# Patient Record
Sex: MALE | Race: BLACK OR AFRICAN AMERICAN | Employment: OTHER | URBAN - METROPOLITAN AREA
[De-identification: names, ages, dates, MRNs, and addresses within clinical notes are randomized per-mention and may not be internally consistent; named-entity substitution may affect disease eponyms.]

---

## 2022-01-01 ENCOUNTER — APPOINTMENT (OUTPATIENT)
Dept: NON INVASIVE DIAGNOSTICS | Age: 72
DRG: 064 | End: 2022-01-01
Attending: INTERNAL MEDICINE
Payer: MEDICARE

## 2022-01-01 ENCOUNTER — APPOINTMENT (OUTPATIENT)
Dept: GENERAL RADIOLOGY | Age: 72
DRG: 064 | End: 2022-01-01
Attending: INTERNAL MEDICINE
Payer: MEDICARE

## 2022-01-01 ENCOUNTER — APPOINTMENT (OUTPATIENT)
Dept: CT IMAGING | Age: 72
DRG: 064 | End: 2022-01-01
Attending: INTERNAL MEDICINE
Payer: MEDICARE

## 2022-01-01 ENCOUNTER — APPOINTMENT (OUTPATIENT)
Dept: ULTRASOUND IMAGING | Age: 72
DRG: 064 | End: 2022-01-01
Attending: INTERNAL MEDICINE
Payer: MEDICARE

## 2022-01-01 ENCOUNTER — APPOINTMENT (OUTPATIENT)
Dept: MRI IMAGING | Age: 72
DRG: 064 | End: 2022-01-01
Attending: INTERNAL MEDICINE
Payer: MEDICARE

## 2022-01-01 ENCOUNTER — HOSPICE ADMISSION (OUTPATIENT)
Dept: HOSPICE | Facility: HOSPICE | Age: 72
End: 2022-01-01
Payer: MEDICARE

## 2022-01-01 ENCOUNTER — HOME CARE VISIT (OUTPATIENT)
Dept: SCHEDULING | Facility: HOME HEALTH | Age: 72
End: 2022-01-01
Payer: MEDICARE

## 2022-01-01 ENCOUNTER — HOME CARE VISIT (OUTPATIENT)
Dept: HOSPICE | Facility: HOSPICE | Age: 72
End: 2022-01-01
Payer: MEDICARE

## 2022-01-01 ENCOUNTER — APPOINTMENT (OUTPATIENT)
Dept: CT IMAGING | Age: 72
DRG: 064 | End: 2022-01-01
Attending: EMERGENCY MEDICINE
Payer: MEDICARE

## 2022-01-01 ENCOUNTER — APPOINTMENT (OUTPATIENT)
Dept: GENERAL RADIOLOGY | Age: 72
DRG: 064 | End: 2022-01-01
Attending: EMERGENCY MEDICINE
Payer: MEDICARE

## 2022-01-01 ENCOUNTER — APPOINTMENT (OUTPATIENT)
Dept: VASCULAR SURGERY | Age: 72
DRG: 064 | End: 2022-01-01
Attending: INTERNAL MEDICINE
Payer: MEDICARE

## 2022-01-01 ENCOUNTER — HOSPITAL ENCOUNTER (INPATIENT)
Age: 72
LOS: 12 days | Discharge: HOSPICE | DRG: 064 | End: 2022-09-04
Attending: EMERGENCY MEDICINE | Admitting: INTERNAL MEDICINE
Payer: MEDICARE

## 2022-01-01 VITALS
BODY MASS INDEX: 21.8 KG/M2 | RESPIRATION RATE: 19 BRPM | DIASTOLIC BLOOD PRESSURE: 74 MMHG | HEART RATE: 112 BPM | OXYGEN SATURATION: 93 % | SYSTOLIC BLOOD PRESSURE: 123 MMHG | TEMPERATURE: 98.2 F | HEIGHT: 72 IN | WEIGHT: 160.94 LBS

## 2022-01-01 VITALS
SYSTOLIC BLOOD PRESSURE: 120 MMHG | HEART RATE: 120 BPM | OXYGEN SATURATION: 88 % | DIASTOLIC BLOOD PRESSURE: 72 MMHG | RESPIRATION RATE: 22 BRPM

## 2022-01-01 DIAGNOSIS — R63.30 FEEDING DIFFICULTY: ICD-10-CM

## 2022-01-01 DIAGNOSIS — N30.01 ACUTE CYSTITIS WITH HEMATURIA: ICD-10-CM

## 2022-01-01 DIAGNOSIS — G93.40 ACUTE ENCEPHALOPATHY: Primary | ICD-10-CM

## 2022-01-01 DIAGNOSIS — Z71.89 GOALS OF CARE, COUNSELING/DISCUSSION: ICD-10-CM

## 2022-01-01 LAB
25(OH)D3 SERPL-MCNC: 26 NG/ML (ref 30–100)
ALBUMIN SERPL ELPH-MCNC: 3.1 G/DL (ref 2.9–4.4)
ALBUMIN SERPL-MCNC: 2.5 G/DL (ref 3.5–5)
ALBUMIN SERPL-MCNC: 2.5 G/DL (ref 3.5–5)
ALBUMIN SERPL-MCNC: 2.6 G/DL (ref 3.5–5)
ALBUMIN SERPL-MCNC: 2.7 G/DL (ref 3.5–5)
ALBUMIN SERPL-MCNC: 3.4 G/DL (ref 3.5–5)
ALBUMIN SERPL-MCNC: 3.6 G/DL (ref 3.5–5)
ALBUMIN/GLOB SERPL: 0.6 {RATIO} (ref 1.1–2.2)
ALBUMIN/GLOB SERPL: 0.6 {RATIO} (ref 1.1–2.2)
ALBUMIN/GLOB SERPL: 1 {RATIO} (ref 1.1–2.2)
ALBUMIN/GLOB SERPL: 1.1 {RATIO} (ref 0.7–1.7)
ALBUMIN/GLOB SERPL: 1.1 {RATIO} (ref 1.1–2.2)
ALP SERPL-CCNC: 106 U/L (ref 45–117)
ALP SERPL-CCNC: 114 U/L (ref 45–117)
ALP SERPL-CCNC: 75 U/L (ref 45–117)
ALP SERPL-CCNC: 78 U/L (ref 45–117)
ALPHA1 GLOB SERPL ELPH-MCNC: 0.5 G/DL (ref 0–0.4)
ALPHA2 GLOB SERPL ELPH-MCNC: 0.8 G/DL (ref 0.4–1)
ALT SERPL-CCNC: 12 U/L (ref 12–78)
ALT SERPL-CCNC: 14 U/L (ref 12–78)
ALT SERPL-CCNC: 18 U/L (ref 12–78)
ALT SERPL-CCNC: 20 U/L (ref 12–78)
AMMONIA PLAS-SCNC: 16 UMOL/L
AMMONIA PLAS-SCNC: <10 UMOL/L
AMPHET UR QL SCN: NEGATIVE
ANA SER QL: NEGATIVE
ANION GAP SERPL CALC-SCNC: 12 MMOL/L (ref 5–15)
ANION GAP SERPL CALC-SCNC: 5 MMOL/L (ref 5–15)
ANION GAP SERPL CALC-SCNC: 6 MMOL/L (ref 5–15)
ANION GAP SERPL CALC-SCNC: 6 MMOL/L (ref 5–15)
ANION GAP SERPL CALC-SCNC: 7 MMOL/L (ref 5–15)
ANION GAP SERPL CALC-SCNC: 8 MMOL/L (ref 5–15)
ANION GAP SERPL CALC-SCNC: 9 MMOL/L (ref 5–15)
ANION GAP SERPL CALC-SCNC: 9 MMOL/L (ref 5–15)
APPEARANCE UR: ABNORMAL
APPEARANCE UR: CLEAR
ARTERIAL PATENCY WRIST A: POSITIVE
AST SERPL-CCNC: 11 U/L (ref 15–37)
AST SERPL-CCNC: 12 U/L (ref 15–37)
AST SERPL-CCNC: 13 U/L (ref 15–37)
AST SERPL-CCNC: 14 U/L (ref 15–37)
ATRIAL RATE: 101 BPM
B-GLOBULIN SERPL ELPH-MCNC: 0.9 G/DL (ref 0.7–1.3)
BACTERIA SPEC CULT: NORMAL
BACTERIA URNS QL MICRO: ABNORMAL /HPF
BACTERIA URNS QL MICRO: NEGATIVE /HPF
BARBITURATES UR QL SCN: NEGATIVE
BASE DEFICIT BLD-SCNC: 0.3 MMOL/L
BASE DEFICIT BLD-SCNC: 1.3 MMOL/L
BASOPHILS # BLD: 0 K/UL (ref 0–0.1)
BASOPHILS NFR BLD: 0 % (ref 0–1)
BDY SITE: ABNORMAL
BENZODIAZ UR QL: NEGATIVE
BILIRUB SERPL-MCNC: 0.5 MG/DL (ref 0.2–1)
BILIRUB SERPL-MCNC: 0.6 MG/DL (ref 0.2–1)
BILIRUB SERPL-MCNC: 0.7 MG/DL (ref 0.2–1)
BILIRUB SERPL-MCNC: 0.7 MG/DL (ref 0.2–1)
BILIRUB UR QL: NEGATIVE
BILIRUB UR QL: NEGATIVE
BNP SERPL-MCNC: 674 PG/ML
BUN SERPL-MCNC: 129 MG/DL (ref 6–20)
BUN SERPL-MCNC: 30 MG/DL (ref 6–20)
BUN SERPL-MCNC: 33 MG/DL (ref 6–20)
BUN SERPL-MCNC: 35 MG/DL (ref 6–20)
BUN SERPL-MCNC: 42 MG/DL (ref 6–20)
BUN SERPL-MCNC: 43 MG/DL (ref 6–20)
BUN SERPL-MCNC: 50 MG/DL (ref 6–20)
BUN SERPL-MCNC: 59 MG/DL (ref 6–20)
BUN SERPL-MCNC: 81 MG/DL (ref 6–20)
BUN SERPL-MCNC: 92 MG/DL (ref 6–20)
BUN/CREAT SERPL: 10 (ref 12–20)
BUN/CREAT SERPL: 10 (ref 12–20)
BUN/CREAT SERPL: 11 (ref 12–20)
BUN/CREAT SERPL: 12 (ref 12–20)
BUN/CREAT SERPL: 12 (ref 12–20)
BUN/CREAT SERPL: 16 (ref 12–20)
BUN/CREAT SERPL: 16 (ref 12–20)
BUN/CREAT SERPL: 17 (ref 12–20)
C-ANCA TITR SER IF: NORMAL TITER
CA-I BLD-MCNC: 1.24 MMOL/L (ref 1.12–1.32)
CALCIUM SERPL-MCNC: 8.6 MG/DL (ref 8.5–10.1)
CALCIUM SERPL-MCNC: 8.7 MG/DL (ref 8.5–10.1)
CALCIUM SERPL-MCNC: 9 MG/DL (ref 8.5–10.1)
CALCIUM SERPL-MCNC: 9.1 MG/DL (ref 8.5–10.1)
CALCIUM SERPL-MCNC: 9.1 MG/DL (ref 8.5–10.1)
CALCULATED P AXIS, ECG09: 76 DEGREES
CALCULATED R AXIS, ECG10: 41 DEGREES
CALCULATED T AXIS, ECG11: -32 DEGREES
CANNABINOIDS UR QL SCN: NEGATIVE
CAOX CRY URNS QL MICRO: ABNORMAL
CHLORIDE BLD-SCNC: 110 MMOL/L (ref 100–108)
CHLORIDE SERPL-SCNC: 110 MMOL/L (ref 97–108)
CHLORIDE SERPL-SCNC: 111 MMOL/L (ref 97–108)
CHLORIDE SERPL-SCNC: 111 MMOL/L (ref 97–108)
CHLORIDE SERPL-SCNC: 113 MMOL/L (ref 97–108)
CHLORIDE SERPL-SCNC: 114 MMOL/L (ref 97–108)
CHLORIDE SERPL-SCNC: 119 MMOL/L (ref 97–108)
CHLORIDE SERPL-SCNC: 119 MMOL/L (ref 97–108)
CHLORIDE SERPL-SCNC: 123 MMOL/L (ref 97–108)
CHLORIDE SERPL-SCNC: 126 MMOL/L (ref 97–108)
CHLORIDE SERPL-SCNC: 129 MMOL/L (ref 97–108)
CHLORIDE UR-SCNC: 10 MMOL/L
CHOLEST SERPL-MCNC: 196 MG/DL
CK SERPL-CCNC: 169 U/L (ref 39–308)
CK SERPL-CCNC: 97 U/L (ref 39–308)
CO2 BLD-SCNC: 27 MMOL/L (ref 19–24)
CO2 SERPL-SCNC: 19 MMOL/L (ref 21–32)
CO2 SERPL-SCNC: 19 MMOL/L (ref 21–32)
CO2 SERPL-SCNC: 21 MMOL/L (ref 21–32)
CO2 SERPL-SCNC: 22 MMOL/L (ref 21–32)
CO2 SERPL-SCNC: 23 MMOL/L (ref 21–32)
CO2 SERPL-SCNC: 23 MMOL/L (ref 21–32)
CO2 SERPL-SCNC: 24 MMOL/L (ref 21–32)
CO2 SERPL-SCNC: 24 MMOL/L (ref 21–32)
CO2 SERPL-SCNC: 27 MMOL/L (ref 21–32)
CO2 SERPL-SCNC: 28 MMOL/L (ref 21–32)
COCAINE UR QL SCN: NEGATIVE
COLOR UR: ABNORMAL
COLOR UR: ABNORMAL
COMMENT, HOLDF: NORMAL
CREAT SERPL-MCNC: 3.04 MG/DL (ref 0.7–1.3)
CREAT SERPL-MCNC: 3.22 MG/DL (ref 0.7–1.3)
CREAT SERPL-MCNC: 3.28 MG/DL (ref 0.7–1.3)
CREAT SERPL-MCNC: 3.81 MG/DL (ref 0.7–1.3)
CREAT SERPL-MCNC: 3.92 MG/DL (ref 0.7–1.3)
CREAT SERPL-MCNC: 4.33 MG/DL (ref 0.7–1.3)
CREAT SERPL-MCNC: 4.73 MG/DL (ref 0.7–1.3)
CREAT SERPL-MCNC: 5.14 MG/DL (ref 0.7–1.3)
CREAT SERPL-MCNC: 5.72 MG/DL (ref 0.7–1.3)
CREAT SERPL-MCNC: 7.66 MG/DL (ref 0.7–1.3)
CREAT UR-MCNC: 189 MG/DL
CREAT UR-MCNC: 3.7 MG/DL (ref 0.6–1.3)
DATE LAST DOSE: ABNORMAL
DIAGNOSIS, 93000: NORMAL
DIFFERENTIAL METHOD BLD: ABNORMAL
DRUG SCRN COMMENT,DRGCM: NORMAL
ECHO AV MEAN GRADIENT: 3 MMHG
ECHO AV MEAN VELOCITY: 0.8 M/S
ECHO AV PEAK GRADIENT: 4 MMHG
ECHO AV PEAK VELOCITY: 1 M/S
ECHO AV VTI: 17.6 CM
ECHO LA DIAMETER: 2.6 CM
ECHO LV E' LATERAL VELOCITY: 6 CM/S
ECHO LV E' SEPTAL VELOCITY: 4 CM/S
ECHO LV FRACTIONAL SHORTENING: 37 % (ref 28–44)
ECHO LV INTERNAL DIMENSION DIASTOLIC: 4.1 CM (ref 4.2–5.9)
ECHO LV INTERNAL DIMENSION SYSTOLIC: 2.6 CM
ECHO LV IVSD: 0.8 CM (ref 0.6–1)
ECHO LV MASS 2D: 123 G (ref 88–224)
ECHO LV POSTERIOR WALL DIASTOLIC: 1.1 CM (ref 0.6–1)
ECHO LV RELATIVE WALL THICKNESS RATIO: 0.54
ECHO LVOT AREA: 2.5 CM2
ECHO LVOT DIAM: 1.8 CM
ECHO MV A VELOCITY: 0.78 M/S
ECHO MV E DECELERATION TIME (DT): 66.9 MS
ECHO MV E VELOCITY: 0.49 M/S
ECHO MV E/A RATIO: 0.63
ECHO MV E/E' LATERAL: 8.17
ECHO MV E/E' RATIO (AVERAGED): 10.21
ECHO MV E/E' SEPTAL: 12.25
EOSINOPHIL # BLD: 0 K/UL (ref 0–0.4)
EOSINOPHIL # BLD: 0 K/UL (ref 0–0.4)
EOSINOPHIL # BLD: 0.1 K/UL (ref 0–0.4)
EOSINOPHIL # BLD: 0.4 K/UL (ref 0–0.4)
EOSINOPHIL NFR BLD: 0 % (ref 0–7)
EOSINOPHIL NFR BLD: 1 % (ref 0–7)
EOSINOPHIL NFR BLD: 4 % (ref 0–7)
EOSINOPHIL NFR BLD: 6 % (ref 0–7)
EPITH CASTS URNS QL MICRO: ABNORMAL /LPF
EPITH CASTS URNS QL MICRO: ABNORMAL /LPF
ERYTHROCYTE [DISTWIDTH] IN BLOOD BY AUTOMATED COUNT: 12.3 % (ref 11.5–14.5)
ERYTHROCYTE [DISTWIDTH] IN BLOOD BY AUTOMATED COUNT: 12.3 % (ref 11.5–14.5)
ERYTHROCYTE [DISTWIDTH] IN BLOOD BY AUTOMATED COUNT: 12.9 % (ref 11.5–14.5)
ERYTHROCYTE [DISTWIDTH] IN BLOOD BY AUTOMATED COUNT: 13 % (ref 11.5–14.5)
ERYTHROCYTE [DISTWIDTH] IN BLOOD BY AUTOMATED COUNT: 13.1 % (ref 11.5–14.5)
ERYTHROCYTE [SEDIMENTATION RATE] IN BLOOD: 42 MM/HR (ref 0–20)
EST. AVERAGE GLUCOSE BLD GHB EST-MCNC: 105 MG/DL
ETHANOL SERPL-MCNC: <10 MG/DL
FOLATE SERPL-MCNC: 14.3 NG/ML (ref 5–21)
GAMMA GLOB SERPL ELPH-MCNC: 0.7 G/DL (ref 0.4–1.8)
GAS FLOW.O2 O2 DELIVERY SYS: ABNORMAL L/MIN
GLOBULIN SER CALC-MCNC: 3.3 G/DL (ref 2–4)
GLOBULIN SER CALC-MCNC: 3.4 G/DL (ref 2–4)
GLOBULIN SER CALC-MCNC: 3.9 G/DL (ref 2–4)
GLOBULIN SER CALC-MCNC: 4.1 G/DL (ref 2–4)
GLOBULIN SER-MCNC: 2.9 G/DL (ref 2.2–3.9)
GLUCOSE BLD STRIP.AUTO-MCNC: 103 MG/DL (ref 65–117)
GLUCOSE BLD STRIP.AUTO-MCNC: 124 MG/DL (ref 65–117)
GLUCOSE BLD STRIP.AUTO-MCNC: 154 MG/DL (ref 65–117)
GLUCOSE BLD STRIP.AUTO-MCNC: 169 MG/DL (ref 65–117)
GLUCOSE BLD STRIP.AUTO-MCNC: 98 MG/DL (ref 74–106)
GLUCOSE SERPL-MCNC: 104 MG/DL (ref 65–100)
GLUCOSE SERPL-MCNC: 105 MG/DL (ref 65–100)
GLUCOSE SERPL-MCNC: 123 MG/DL (ref 65–100)
GLUCOSE SERPL-MCNC: 148 MG/DL (ref 65–100)
GLUCOSE SERPL-MCNC: 151 MG/DL (ref 65–100)
GLUCOSE SERPL-MCNC: 152 MG/DL (ref 65–100)
GLUCOSE SERPL-MCNC: 164 MG/DL (ref 65–100)
GLUCOSE SERPL-MCNC: 165 MG/DL (ref 65–100)
GLUCOSE SERPL-MCNC: 184 MG/DL (ref 65–100)
GLUCOSE SERPL-MCNC: 218 MG/DL (ref 65–100)
GLUCOSE UR STRIP.AUTO-MCNC: NEGATIVE MG/DL
GLUCOSE UR STRIP.AUTO-MCNC: NEGATIVE MG/DL
GRAN CASTS URNS QL MICRO: ABNORMAL /LPF
HAV IGM SER QL: NONREACTIVE
HBA1C MFR BLD: 5.3 % (ref 4–5.6)
HBV CORE IGM SER QL: NONREACTIVE
HBV SURFACE AG SER QL: <0.1 INDEX
HBV SURFACE AG SER QL: NEGATIVE
HCO3 BLD-SCNC: 22.2 MMOL/L (ref 22–26)
HCO3 BLDA-SCNC: 27 MMOL/L
HCT VFR BLD AUTO: 28 % (ref 36.6–50.3)
HCT VFR BLD AUTO: 28.3 % (ref 36.6–50.3)
HCT VFR BLD AUTO: 29.4 % (ref 36.6–50.3)
HCT VFR BLD AUTO: 29.4 % (ref 36.6–50.3)
HCT VFR BLD AUTO: 30.2 % (ref 36.6–50.3)
HCV AB SERPL QL IA: NONREACTIVE
HCYS SERPL-SCNC: 20.2 UMOL/L (ref 3.7–13.9)
HDLC SERPL-MCNC: 50 MG/DL
HDLC SERPL: 3.9 {RATIO} (ref 0–5)
HGB BLD-MCNC: 8.7 G/DL (ref 12.1–17)
HGB BLD-MCNC: 9.3 G/DL (ref 12.1–17)
HGB BLD-MCNC: 9.4 G/DL (ref 12.1–17)
HGB BLD-MCNC: 9.7 G/DL (ref 12.1–17)
HGB BLD-MCNC: 9.9 G/DL (ref 12.1–17)
HGB UR QL STRIP: ABNORMAL
HGB UR QL STRIP: ABNORMAL
IGA SERPL-MCNC: 71 MG/DL (ref 61–437)
IGG SERPL-MCNC: 825 MG/DL (ref 603–1613)
IGM SERPL-MCNC: 59 MG/DL (ref 15–143)
IMM GRANULOCYTES # BLD AUTO: 0 K/UL (ref 0–0.04)
IMM GRANULOCYTES # BLD AUTO: 0.1 K/UL (ref 0–0.04)
IMM GRANULOCYTES NFR BLD AUTO: 0 % (ref 0–0.5)
IMM GRANULOCYTES NFR BLD AUTO: 1 % (ref 0–0.5)
INR PPP: 1 (ref 0.9–1.1)
INTERPRETATION SERPL IEP-IMP: ABNORMAL
KAPPA LC FREE SER-MCNC: 38.2 MG/L (ref 3.3–19.4)
KAPPA LC FREE/LAMBDA FREE SER: 0.01 {RATIO} (ref 0.26–1.65)
KETONES UR QL STRIP.AUTO: NEGATIVE MG/DL
KETONES UR QL STRIP.AUTO: NEGATIVE MG/DL
LACTATE BLD-SCNC: 1.58 MMOL/L (ref 0.4–2)
LACTATE SERPL-SCNC: 1.1 MMOL/L (ref 0.4–2)
LAMBDA LC FREE SERPL-MCNC: 4535.2 MG/L (ref 5.7–26.3)
LDLC SERPL CALC-MCNC: 128.2 MG/DL (ref 0–100)
LEFT CCA DIST DIAS: 8.7 CM/S
LEFT CCA DIST SYS: 47.2 CM/S
LEFT CCA PROX DIAS: 11.1 CM/S
LEFT CCA PROX SYS: 77.4 CM/S
LEFT ECA DIAS: 0 CM/S
LEFT ECA SYS: 71.3 CM/S
LEFT ICA DIST DIAS: 20.8 CM/S
LEFT ICA DIST SYS: 74.6 CM/S
LEFT ICA MID DIAS: 15.6 CM/S
LEFT ICA MID SYS: 56.6 CM/S
LEFT ICA PROX DIAS: 7.8 CM/S
LEFT ICA PROX SYS: 21.7 CM/S
LEFT ICA/CCA SYS: 1.58 NO UNITS
LEFT VERTEBRAL DIAS: 9.18 CM/S
LEFT VERTEBRAL SYS: 27 CM/S
LEUKOCYTE ESTERASE UR QL STRIP.AUTO: NEGATIVE
LEUKOCYTE ESTERASE UR QL STRIP.AUTO: NEGATIVE
LYMPHOCYTES # BLD: 0.5 K/UL (ref 0.8–3.5)
LYMPHOCYTES # BLD: 0.5 K/UL (ref 0.8–3.5)
LYMPHOCYTES # BLD: 0.8 K/UL (ref 0.8–3.5)
LYMPHOCYTES # BLD: 1 K/UL (ref 0.8–3.5)
LYMPHOCYTES NFR BLD: 10 % (ref 12–49)
LYMPHOCYTES NFR BLD: 11 % (ref 12–49)
LYMPHOCYTES NFR BLD: 12 % (ref 12–49)
LYMPHOCYTES NFR BLD: 26 % (ref 12–49)
M PROTEIN SERPL ELPH-MCNC: ABNORMAL G/DL
MCH RBC QN AUTO: 30.9 PG (ref 26–34)
MCH RBC QN AUTO: 31 PG (ref 26–34)
MCH RBC QN AUTO: 31.1 PG (ref 26–34)
MCH RBC QN AUTO: 31.5 PG (ref 26–34)
MCH RBC QN AUTO: 31.8 PG (ref 26–34)
MCHC RBC AUTO-ENTMCNC: 31.1 G/DL (ref 30–36.5)
MCHC RBC AUTO-ENTMCNC: 32 G/DL (ref 30–36.5)
MCHC RBC AUTO-ENTMCNC: 32.8 G/DL (ref 30–36.5)
MCHC RBC AUTO-ENTMCNC: 32.9 G/DL (ref 30–36.5)
MCHC RBC AUTO-ENTMCNC: 33 G/DL (ref 30–36.5)
MCV RBC AUTO: 94.6 FL (ref 80–99)
MCV RBC AUTO: 95.5 FL (ref 80–99)
MCV RBC AUTO: 97 FL (ref 80–99)
MCV RBC AUTO: 97.1 FL (ref 80–99)
MCV RBC AUTO: 99.3 FL (ref 80–99)
METHADONE UR QL: NEGATIVE
MONOCYTES # BLD: 0 K/UL (ref 0–1)
MONOCYTES # BLD: 0.2 K/UL (ref 0–1)
MONOCYTES # BLD: 0.2 K/UL (ref 0–1)
MONOCYTES # BLD: 0.4 K/UL (ref 0–1)
MONOCYTES NFR BLD: 1 % (ref 5–13)
MONOCYTES NFR BLD: 3 % (ref 5–13)
MONOCYTES NFR BLD: 5 % (ref 5–13)
MONOCYTES NFR BLD: 7 % (ref 5–13)
MYELOPEROXIDASE AB SER IA-ACNC: <0.2 UNITS (ref 0–0.9)
NEUTS BAND NFR BLD MANUAL: 3 %
NEUTS BAND NFR BLD MANUAL: 30 %
NEUTS SEG # BLD: 2.4 K/UL (ref 1.8–8)
NEUTS SEG # BLD: 3.8 K/UL (ref 1.8–8)
NEUTS SEG # BLD: 4.5 K/UL (ref 1.8–8)
NEUTS SEG # BLD: 4.7 K/UL (ref 1.8–8)
NEUTS SEG NFR BLD: 57 % (ref 32–75)
NEUTS SEG NFR BLD: 65 % (ref 32–75)
NEUTS SEG NFR BLD: 74 % (ref 32–75)
NEUTS SEG NFR BLD: 84 % (ref 32–75)
NITRITE UR QL STRIP.AUTO: NEGATIVE
NITRITE UR QL STRIP.AUTO: NEGATIVE
NRBC # BLD: 0 K/UL (ref 0–0.01)
NRBC # BLD: 0.02 K/UL (ref 0–0.01)
NRBC BLD-RTO: 0 PER 100 WBC
NRBC BLD-RTO: 0.5 PER 100 WBC
OPIATES UR QL: NEGATIVE
OTHER,OTHU: ABNORMAL
P-ANCA ATYPICAL TITR SER IF: NORMAL TITER
P-ANCA TITR SER IF: NORMAL TITER
P-R INTERVAL, ECG05: 210 MS
PCO2 BLD: 32.8 MMHG (ref 35–45)
PCO2 BLDV: 54.6 MMHG (ref 41–51)
PCP UR QL: NEGATIVE
PH BLD: 7.44 [PH] (ref 7.35–7.45)
PH BLDV: 7.3 [PH] (ref 7.32–7.42)
PH UR STRIP: 5.5 [PH] (ref 5–8)
PH UR STRIP: 5.5 [PH] (ref 5–8)
PHOSPHATE SERPL-MCNC: 2.7 MG/DL (ref 2.6–4.7)
PHOSPHATE SERPL-MCNC: 2.9 MG/DL (ref 2.6–4.7)
PHOSPHATE SERPL-MCNC: 3.1 MG/DL (ref 2.6–4.7)
PLATELET # BLD AUTO: 149 K/UL (ref 150–400)
PLATELET # BLD AUTO: 152 K/UL (ref 150–400)
PLATELET # BLD AUTO: 152 K/UL (ref 150–400)
PLATELET # BLD AUTO: 156 K/UL (ref 150–400)
PLATELET # BLD AUTO: 171 K/UL (ref 150–400)
PLATELET COMMENTS,PCOM: ABNORMAL
PMV BLD AUTO: 10.3 FL (ref 8.9–12.9)
PMV BLD AUTO: 10.6 FL (ref 8.9–12.9)
PMV BLD AUTO: 10.9 FL (ref 8.9–12.9)
PMV BLD AUTO: 9.4 FL (ref 8.9–12.9)
PMV BLD AUTO: 9.6 FL (ref 8.9–12.9)
PO2 BLD: 75 MMHG (ref 80–100)
PO2 BLDV: 21 MMHG (ref 25–40)
POTASSIUM BLD-SCNC: 3.9 MMOL/L (ref 3.5–5.5)
POTASSIUM SERPL-SCNC: 3.7 MMOL/L (ref 3.5–5.1)
POTASSIUM SERPL-SCNC: 3.8 MMOL/L (ref 3.5–5.1)
POTASSIUM SERPL-SCNC: 3.9 MMOL/L (ref 3.5–5.1)
POTASSIUM SERPL-SCNC: 3.9 MMOL/L (ref 3.5–5.1)
POTASSIUM SERPL-SCNC: 4 MMOL/L (ref 3.5–5.1)
POTASSIUM SERPL-SCNC: 4.9 MMOL/L (ref 3.5–5.1)
POTASSIUM SERPL-SCNC: 5 MMOL/L (ref 3.5–5.1)
POTASSIUM SERPL-SCNC: 5.6 MMOL/L (ref 3.5–5.1)
PROCALCITONIN SERPL-MCNC: 18.9 NG/ML
PROT SERPL-MCNC: 6 G/DL (ref 6–8.5)
PROT SERPL-MCNC: 6.4 G/DL (ref 6.4–8.2)
PROT SERPL-MCNC: 6.6 G/DL (ref 6.4–8.2)
PROT SERPL-MCNC: 6.8 G/DL (ref 6.4–8.2)
PROT SERPL-MCNC: 6.9 G/DL (ref 6.4–8.2)
PROT UR STRIP-MCNC: 100 MG/DL
PROT UR STRIP-MCNC: 100 MG/DL
PROT UR-MCNC: 359 MG/DL (ref 0–11.9)
PROT/CREAT UR-RTO: 1.9
PROTEINASE3 AB SER IA-ACNC: <0.2 UNITS (ref 0–0.9)
PROTHROMBIN TIME: 10.8 SEC (ref 9–11.1)
Q-T INTERVAL, ECG07: 322 MS
QRS DURATION, ECG06: 82 MS
QTC CALCULATION (BEZET), ECG08: 417 MS
RBC # BLD AUTO: 2.82 M/UL (ref 4.1–5.7)
RBC # BLD AUTO: 2.99 M/UL (ref 4.1–5.7)
RBC # BLD AUTO: 3.03 M/UL (ref 4.1–5.7)
RBC # BLD AUTO: 3.08 M/UL (ref 4.1–5.7)
RBC # BLD AUTO: 3.11 M/UL (ref 4.1–5.7)
RBC #/AREA URNS HPF: >100 /HPF (ref 0–5)
RBC #/AREA URNS HPF: ABNORMAL /HPF (ref 0–5)
RBC MORPH BLD: ABNORMAL
REPORTED DOSE,DOSE: ABNORMAL UNITS
REPORTED DOSE/TIME,TMG: 1400
RIGHT CCA DIST DIAS: 10.2 CM/S
RIGHT CCA DIST SYS: 63.3 CM/S
RIGHT CCA PROX DIAS: 12.8 CM/S
RIGHT CCA PROX SYS: 71.1 CM/S
RIGHT ECA DIAS: 9.83 CM/S
RIGHT ECA SYS: 82.3 CM/S
RIGHT ICA DIST DIAS: 19.7 CM/S
RIGHT ICA DIST SYS: 70.3 CM/S
RIGHT ICA MID DIAS: 13 CM/S
RIGHT ICA MID SYS: 57.5 CM/S
RIGHT ICA PROX DIAS: 6.9 CM/S
RIGHT ICA PROX SYS: 38.3 CM/S
RIGHT ICA/CCA SYS: 1.1 NO UNITS
RIGHT VERTEBRAL DIAS: 8.9 CM/S
RIGHT VERTEBRAL SYS: 38.6 CM/S
SAMPLES BEING HELD,HOLD: NORMAL
SAO2 % BLD: 95.6 % (ref 92–97)
SERVICE CMNT-IMP: ABNORMAL
SERVICE CMNT-IMP: NORMAL
SERVICE CMNT-IMP: NORMAL
SODIUM BLD-SCNC: 146 MMOL/L (ref 136–145)
SODIUM SERPL-SCNC: 144 MMOL/L (ref 136–145)
SODIUM SERPL-SCNC: 146 MMOL/L (ref 136–145)
SODIUM SERPL-SCNC: 148 MMOL/L (ref 136–145)
SODIUM SERPL-SCNC: 148 MMOL/L (ref 136–145)
SODIUM SERPL-SCNC: 151 MMOL/L (ref 136–145)
SODIUM SERPL-SCNC: 154 MMOL/L (ref 136–145)
SODIUM SERPL-SCNC: 158 MMOL/L (ref 136–145)
SODIUM UR-SCNC: 12 MMOL/L
SP GR UR REFRACTOMETRY: 1.02
SP GR UR REFRACTOMETRY: 1.03
SP1: NORMAL
SP2: NORMAL
SP3: NORMAL
SPECIMEN SITE: ABNORMAL
SPECIMEN TYPE: ABNORMAL
TRIGL SERPL-MCNC: 89 MG/DL (ref ?–150)
TSH SERPL DL<=0.05 MIU/L-ACNC: 2.63 UIU/ML (ref 0.36–3.74)
UA: UC IF INDICATED,UAUC: ABNORMAL
UR CULT HOLD, URHOLD: NORMAL
UROBILINOGEN UR QL STRIP.AUTO: 0.2 EU/DL (ref 0.2–1)
UROBILINOGEN UR QL STRIP.AUTO: 0.2 EU/DL (ref 0.2–1)
VANCOMYCIN SERPL-MCNC: 14.3 UG/ML
VANCOMYCIN SERPL-MCNC: 16.2 UG/ML
VANCOMYCIN TROUGH SERPL-MCNC: 14.3 UG/ML (ref 5–10)
VAS LEFT SUBCLAVIAN PROX EDV: 0 CM/S
VAS LEFT SUBCLAVIAN PROX PSV: 110.2 CM/S
VAS RIGHT SUBCLAVIAN PROX EDV: 0 CM/S
VAS RIGHT SUBCLAVIAN PROX PSV: 116.1 CM/S
VENTRICULAR RATE, ECG03: 101 BPM
VIT B12 SERPL-MCNC: 315 PG/ML (ref 193–986)
VLDLC SERPL CALC-MCNC: 17.8 MG/DL
WBC # BLD AUTO: 3.7 K/UL (ref 4.1–11.1)
WBC # BLD AUTO: 4.3 K/UL (ref 4.1–11.1)
WBC # BLD AUTO: 4.7 K/UL (ref 4.1–11.1)
WBC # BLD AUTO: 5.2 K/UL (ref 4.1–11.1)
WBC # BLD AUTO: 6.4 K/UL (ref 4.1–11.1)
WBC MORPH BLD: ABNORMAL
WBC URNS QL MICRO: ABNORMAL /HPF (ref 0–4)
WBC URNS QL MICRO: ABNORMAL /HPF (ref 0–4)
YEAST URNS QL MICRO: PRESENT
YEAST URNS QL MICRO: PRESENT

## 2022-01-01 PROCEDURE — 74011250636 HC RX REV CODE- 250/636: Performed by: INTERNAL MEDICINE

## 2022-01-01 PROCEDURE — 84300 ASSAY OF URINE SODIUM: CPT

## 2022-01-01 PROCEDURE — 74011000250 HC RX REV CODE- 250: Performed by: INTERNAL MEDICINE

## 2022-01-01 PROCEDURE — 74011250637 HC RX REV CODE- 250/637: Performed by: STUDENT IN AN ORGANIZED HEALTH CARE EDUCATION/TRAINING PROGRAM

## 2022-01-01 PROCEDURE — 65270000046 HC RM TELEMETRY

## 2022-01-01 PROCEDURE — 51798 US URINE CAPACITY MEASURE: CPT

## 2022-01-01 PROCEDURE — 74011250637 HC RX REV CODE- 250/637: Performed by: INTERNAL MEDICINE

## 2022-01-01 PROCEDURE — 80053 COMPREHEN METABOLIC PANEL: CPT

## 2022-01-01 PROCEDURE — 80061 LIPID PANEL: CPT

## 2022-01-01 PROCEDURE — 36415 COLL VENOUS BLD VENIPUNCTURE: CPT

## 2022-01-01 PROCEDURE — 99285 EMERGENCY DEPT VISIT HI MDM: CPT

## 2022-01-01 PROCEDURE — 92523 SPEECH SOUND LANG COMPREHEN: CPT

## 2022-01-01 PROCEDURE — 77010033678 HC OXYGEN DAILY

## 2022-01-01 PROCEDURE — 99233 SBSQ HOSP IP/OBS HIGH 50: CPT | Performed by: PSYCHIATRY & NEUROLOGY

## 2022-01-01 PROCEDURE — 82550 ASSAY OF CK (CPK): CPT

## 2022-01-01 PROCEDURE — 81001 URINALYSIS AUTO W/SCOPE: CPT

## 2022-01-01 PROCEDURE — 74011000258 HC RX REV CODE- 258: Performed by: INTERNAL MEDICINE

## 2022-01-01 PROCEDURE — 70450 CT HEAD/BRAIN W/O DYE: CPT

## 2022-01-01 PROCEDURE — 94760 N-INVAS EAR/PLS OXIMETRY 1: CPT

## 2022-01-01 PROCEDURE — 74011000636 HC RX REV CODE- 636: Performed by: EMERGENCY MEDICINE

## 2022-01-01 PROCEDURE — 86038 ANTINUCLEAR ANTIBODIES: CPT

## 2022-01-01 PROCEDURE — 82140 ASSAY OF AMMONIA: CPT

## 2022-01-01 PROCEDURE — 80069 RENAL FUNCTION PANEL: CPT

## 2022-01-01 PROCEDURE — 86037 ANCA TITER EACH ANTIBODY: CPT

## 2022-01-01 PROCEDURE — 83880 ASSAY OF NATRIURETIC PEPTIDE: CPT

## 2022-01-01 PROCEDURE — 71045 X-RAY EXAM CHEST 1 VIEW: CPT

## 2022-01-01 PROCEDURE — 82607 VITAMIN B-12: CPT

## 2022-01-01 PROCEDURE — 85025 COMPLETE CBC W/AUTO DIFF WBC: CPT

## 2022-01-01 PROCEDURE — 84100 ASSAY OF PHOSPHORUS: CPT

## 2022-01-01 PROCEDURE — 0042T CT CODE NEURO PERF W CBF: CPT

## 2022-01-01 PROCEDURE — 83605 ASSAY OF LACTIC ACID: CPT

## 2022-01-01 PROCEDURE — 36600 WITHDRAWAL OF ARTERIAL BLOOD: CPT

## 2022-01-01 PROCEDURE — 97162 PT EVAL MOD COMPLEX 30 MIN: CPT

## 2022-01-01 PROCEDURE — 80202 ASSAY OF VANCOMYCIN: CPT

## 2022-01-01 PROCEDURE — 80307 DRUG TEST PRSMV CHEM ANLYZR: CPT

## 2022-01-01 PROCEDURE — 85652 RBC SED RATE AUTOMATED: CPT

## 2022-01-01 PROCEDURE — G0299 HHS/HOSPICE OF RN EA 15 MIN: HCPCS

## 2022-01-01 PROCEDURE — 80048 BASIC METABOLIC PNL TOTAL CA: CPT

## 2022-01-01 PROCEDURE — 84443 ASSAY THYROID STIM HORMONE: CPT

## 2022-01-01 PROCEDURE — 87040 BLOOD CULTURE FOR BACTERIA: CPT

## 2022-01-01 PROCEDURE — 76770 US EXAM ABDO BACK WALL COMP: CPT

## 2022-01-01 PROCEDURE — 97530 THERAPEUTIC ACTIVITIES: CPT

## 2022-01-01 PROCEDURE — 80074 ACUTE HEPATITIS PANEL: CPT

## 2022-01-01 PROCEDURE — 83090 ASSAY OF HOMOCYSTEINE: CPT

## 2022-01-01 PROCEDURE — 74176 CT ABD & PELVIS W/O CONTRAST: CPT

## 2022-01-01 PROCEDURE — HOSPICE MEDICATION HC HH HOSPICE MEDICATION

## 2022-01-01 PROCEDURE — 99232 SBSQ HOSP IP/OBS MODERATE 35: CPT | Performed by: PSYCHIATRY & NEUROLOGY

## 2022-01-01 PROCEDURE — 93005 ELECTROCARDIOGRAM TRACING: CPT

## 2022-01-01 PROCEDURE — 82947 ASSAY GLUCOSE BLOOD QUANT: CPT

## 2022-01-01 PROCEDURE — 82803 BLOOD GASES ANY COMBINATION: CPT

## 2022-01-01 PROCEDURE — 82962 GLUCOSE BLOOD TEST: CPT

## 2022-01-01 PROCEDURE — 82746 ASSAY OF FOLIC ACID SERUM: CPT

## 2022-01-01 PROCEDURE — 3336500001 HSPC ELECTION

## 2022-01-01 PROCEDURE — 93880 EXTRACRANIAL BILAT STUDY: CPT

## 2022-01-01 PROCEDURE — 93306 TTE W/DOPPLER COMPLETE: CPT | Performed by: INTERNAL MEDICINE

## 2022-01-01 PROCEDURE — 4A03X5D MEASUREMENT OF ARTERIAL FLOW, INTRACRANIAL, EXTERNAL APPROACH: ICD-10-PCS | Performed by: EMERGENCY MEDICINE

## 2022-01-01 PROCEDURE — 92610 EVALUATE SWALLOWING FUNCTION: CPT

## 2022-01-01 PROCEDURE — 93306 TTE W/DOPPLER COMPLETE: CPT

## 2022-01-01 PROCEDURE — 99223 1ST HOSP IP/OBS HIGH 75: CPT | Performed by: INTERNAL MEDICINE

## 2022-01-01 PROCEDURE — 82436 ASSAY OF URINE CHLORIDE: CPT

## 2022-01-01 PROCEDURE — 74011250637 HC RX REV CODE- 250/637: Performed by: PSYCHIATRY & NEUROLOGY

## 2022-01-01 PROCEDURE — 99223 1ST HOSP IP/OBS HIGH 75: CPT | Performed by: PSYCHIATRY & NEUROLOGY

## 2022-01-01 PROCEDURE — 93880 EXTRACRANIAL BILAT STUDY: CPT | Performed by: PSYCHIATRY & NEUROLOGY

## 2022-01-01 PROCEDURE — 71250 CT THORAX DX C-: CPT

## 2022-01-01 PROCEDURE — 85027 COMPLETE CBC AUTOMATED: CPT

## 2022-01-01 PROCEDURE — 95819 EEG AWAKE AND ASLEEP: CPT | Performed by: PSYCHIATRY & NEUROLOGY

## 2022-01-01 PROCEDURE — 74018 RADEX ABDOMEN 1 VIEW: CPT

## 2022-01-01 PROCEDURE — 85610 PROTHROMBIN TIME: CPT

## 2022-01-01 PROCEDURE — 70551 MRI BRAIN STEM W/O DYE: CPT

## 2022-01-01 PROCEDURE — 74011250636 HC RX REV CODE- 250/636: Performed by: STUDENT IN AN ORGANIZED HEALTH CARE EDUCATION/TRAINING PROGRAM

## 2022-01-01 PROCEDURE — 84145 PROCALCITONIN (PCT): CPT

## 2022-01-01 PROCEDURE — 0651 HSPC ROUTINE HOME CARE

## 2022-01-01 PROCEDURE — 82784 ASSAY IGA/IGD/IGG/IGM EACH: CPT

## 2022-01-01 PROCEDURE — 92526 ORAL FUNCTION THERAPY: CPT

## 2022-01-01 PROCEDURE — 83036 HEMOGLOBIN GLYCOSYLATED A1C: CPT

## 2022-01-01 PROCEDURE — 70496 CT ANGIOGRAPHY HEAD: CPT

## 2022-01-01 PROCEDURE — 82570 ASSAY OF URINE CREATININE: CPT

## 2022-01-01 PROCEDURE — 95816 EEG AWAKE AND DROWSY: CPT | Performed by: INTERNAL MEDICINE

## 2022-01-01 PROCEDURE — 82306 VITAMIN D 25 HYDROXY: CPT

## 2022-01-01 PROCEDURE — 97166 OT EVAL MOD COMPLEX 45 MIN: CPT

## 2022-01-01 PROCEDURE — 82077 ASSAY SPEC XCP UR&BREATH IA: CPT

## 2022-01-01 RX ORDER — LABETALOL HYDROCHLORIDE 5 MG/ML
10 INJECTION, SOLUTION INTRAVENOUS 4 TIMES DAILY
Status: DISCONTINUED | OUTPATIENT
Start: 2022-01-01 | End: 2022-01-01

## 2022-01-01 RX ORDER — METRONIDAZOLE 500 MG/100ML
500 INJECTION, SOLUTION INTRAVENOUS EVERY 12 HOURS
Status: DISCONTINUED | OUTPATIENT
Start: 2022-01-01 | End: 2022-01-01

## 2022-01-01 RX ORDER — METOPROLOL TARTRATE 5 MG/5ML
5 INJECTION INTRAVENOUS EVERY 6 HOURS
Status: DISCONTINUED | OUTPATIENT
Start: 2022-01-01 | End: 2022-01-01

## 2022-01-01 RX ORDER — HYDRALAZINE HYDROCHLORIDE 20 MG/ML
20 INJECTION INTRAMUSCULAR; INTRAVENOUS
Status: DISCONTINUED | OUTPATIENT
Start: 2022-01-01 | End: 2022-01-01

## 2022-01-01 RX ORDER — CLOPIDOGREL BISULFATE 75 MG/1
75 TABLET ORAL DAILY
Status: DISCONTINUED | OUTPATIENT
Start: 2022-01-01 | End: 2022-01-01

## 2022-01-01 RX ORDER — HYDRALAZINE HYDROCHLORIDE 20 MG/ML
10 INJECTION INTRAMUSCULAR; INTRAVENOUS EVERY 6 HOURS
Status: DISCONTINUED | OUTPATIENT
Start: 2022-01-01 | End: 2022-01-01

## 2022-01-01 RX ORDER — LABETALOL HYDROCHLORIDE 5 MG/ML
10 INJECTION, SOLUTION INTRAVENOUS
Status: DISCONTINUED | OUTPATIENT
Start: 2022-01-01 | End: 2022-01-01

## 2022-01-01 RX ORDER — ACETAMINOPHEN 650 MG/1
650 SUPPOSITORY RECTAL
Status: DISCONTINUED | OUTPATIENT
Start: 2022-01-01 | End: 2022-01-01 | Stop reason: HOSPADM

## 2022-01-01 RX ORDER — ACETAMINOPHEN 325 MG/1
650 TABLET ORAL
Status: DISCONTINUED | OUTPATIENT
Start: 2022-01-01 | End: 2022-01-01

## 2022-01-01 RX ORDER — DEXTROSE MONOHYDRATE 50 MG/ML
75 INJECTION, SOLUTION INTRAVENOUS CONTINUOUS
Status: DISPENSED | OUTPATIENT
Start: 2022-01-01 | End: 2022-01-01

## 2022-01-01 RX ORDER — LABETALOL 100 MG/1
100 TABLET, FILM COATED ORAL 2 TIMES DAILY
Status: DISCONTINUED | OUTPATIENT
Start: 2022-01-01 | End: 2022-01-01

## 2022-01-01 RX ORDER — LINEZOLID 2 MG/ML
600 INJECTION, SOLUTION INTRAVENOUS EVERY 12 HOURS
Status: DISCONTINUED | OUTPATIENT
Start: 2022-01-01 | End: 2022-01-01

## 2022-01-01 RX ORDER — GUAIFENESIN 100 MG/5ML
81 LIQUID (ML) ORAL DAILY
Status: DISCONTINUED | OUTPATIENT
Start: 2022-01-01 | End: 2022-01-01

## 2022-01-01 RX ORDER — DEXTROSE MONOHYDRATE AND SODIUM CHLORIDE 5; .45 G/100ML; G/100ML
50 INJECTION, SOLUTION INTRAVENOUS CONTINUOUS
Status: DISPENSED | OUTPATIENT
Start: 2022-01-01 | End: 2022-01-01

## 2022-01-01 RX ORDER — AMLODIPINE BESYLATE 5 MG/1
10 TABLET ORAL DAILY
Status: DISCONTINUED | OUTPATIENT
Start: 2022-01-01 | End: 2022-01-01

## 2022-01-01 RX ORDER — VANCOMYCIN 1.75 GRAM/500 ML IN 0.9 % SODIUM CHLORIDE INTRAVENOUS
1750 ONCE
Status: COMPLETED | OUTPATIENT
Start: 2022-01-01 | End: 2022-01-01

## 2022-01-01 RX ORDER — BALSAM PERU/CASTOR OIL
OINTMENT (GRAM) TOPICAL 2 TIMES DAILY
Status: DISCONTINUED | OUTPATIENT
Start: 2022-01-01 | End: 2022-01-01

## 2022-01-01 RX ORDER — LEVETIRACETAM 500 MG/5ML
1000 INJECTION, SOLUTION, CONCENTRATE INTRAVENOUS EVERY 12 HOURS
Status: DISCONTINUED | OUTPATIENT
Start: 2022-01-01 | End: 2022-01-01

## 2022-01-01 RX ORDER — HEPARIN SODIUM 5000 [USP'U]/ML
5000 INJECTION, SOLUTION INTRAVENOUS; SUBCUTANEOUS EVERY 8 HOURS
Status: DISCONTINUED | OUTPATIENT
Start: 2022-01-01 | End: 2022-01-01

## 2022-01-01 RX ORDER — SODIUM CHLORIDE 9 MG/ML
100 INJECTION, SOLUTION INTRAVENOUS CONTINUOUS
Status: DISPENSED | OUTPATIENT
Start: 2022-01-01 | End: 2022-01-01

## 2022-01-01 RX ORDER — ATORVASTATIN CALCIUM 40 MG/1
40 TABLET, FILM COATED ORAL
Status: DISCONTINUED | OUTPATIENT
Start: 2022-01-01 | End: 2022-01-01

## 2022-01-01 RX ORDER — SODIUM CHLORIDE 9 MG/ML
75 INJECTION, SOLUTION INTRAVENOUS CONTINUOUS
Status: DISCONTINUED | OUTPATIENT
Start: 2022-01-01 | End: 2022-01-01

## 2022-01-01 RX ADMIN — HYDRALAZINE HYDROCHLORIDE 10 MG: 20 INJECTION INTRAMUSCULAR; INTRAVENOUS at 13:03

## 2022-01-01 RX ADMIN — LABETALOL HYDROCHLORIDE 10 MG: 5 INJECTION, SOLUTION INTRAVENOUS at 21:56

## 2022-01-01 RX ADMIN — LABETALOL HYDROCHLORIDE 10 MG: 5 INJECTION, SOLUTION INTRAVENOUS at 09:00

## 2022-01-01 RX ADMIN — HEPARIN SODIUM 5000 UNITS: 5000 INJECTION INTRAVENOUS; SUBCUTANEOUS at 15:29

## 2022-01-01 RX ADMIN — HYDRALAZINE HYDROCHLORIDE 10 MG: 20 INJECTION INTRAMUSCULAR; INTRAVENOUS at 13:50

## 2022-01-01 RX ADMIN — HEPARIN SODIUM 5000 UNITS: 5000 INJECTION INTRAVENOUS; SUBCUTANEOUS at 05:23

## 2022-01-01 RX ADMIN — LEVETIRACETAM 1000 MG: 100 INJECTION, SOLUTION, CONCENTRATE INTRAVENOUS at 22:10

## 2022-01-01 RX ADMIN — IOPAMIDOL 100 ML: 755 INJECTION, SOLUTION INTRAVENOUS at 12:04

## 2022-01-01 RX ADMIN — CEFEPIME 1 G: 1 INJECTION, POWDER, FOR SOLUTION INTRAMUSCULAR; INTRAVENOUS at 12:20

## 2022-01-01 RX ADMIN — VANCOMYCIN HYDROCHLORIDE 750 MG: 750 INJECTION, POWDER, LYOPHILIZED, FOR SOLUTION INTRAVENOUS at 15:19

## 2022-01-01 RX ADMIN — LABETALOL HYDROCHLORIDE 100 MG: 100 TABLET, FILM COATED ORAL at 09:40

## 2022-01-01 RX ADMIN — HYDRALAZINE HYDROCHLORIDE 10 MG: 20 INJECTION INTRAMUSCULAR; INTRAVENOUS at 15:27

## 2022-01-01 RX ADMIN — CEFEPIME 1 G: 1 INJECTION, POWDER, FOR SOLUTION INTRAMUSCULAR; INTRAVENOUS at 23:35

## 2022-01-01 RX ADMIN — THIAMINE HYDROCHLORIDE 200 MG: 100 INJECTION, SOLUTION INTRAMUSCULAR; INTRAVENOUS at 10:09

## 2022-01-01 RX ADMIN — THIAMINE HYDROCHLORIDE 200 MG: 100 INJECTION, SOLUTION INTRAMUSCULAR; INTRAVENOUS at 08:14

## 2022-01-01 RX ADMIN — CEFEPIME 1 G: 1 INJECTION, POWDER, FOR SOLUTION INTRAMUSCULAR; INTRAVENOUS at 11:21

## 2022-01-01 RX ADMIN — CEFEPIME 1 G: 1 INJECTION, POWDER, FOR SOLUTION INTRAMUSCULAR; INTRAVENOUS at 05:37

## 2022-01-01 RX ADMIN — HYDRALAZINE HYDROCHLORIDE 10 MG: 20 INJECTION INTRAMUSCULAR; INTRAVENOUS at 06:30

## 2022-01-01 RX ADMIN — DEXTROSE AND SODIUM CHLORIDE 75 ML/HR: 5; 450 INJECTION, SOLUTION INTRAVENOUS at 06:32

## 2022-01-01 RX ADMIN — LABETALOL HYDROCHLORIDE 10 MG: 5 INJECTION INTRAVENOUS at 04:22

## 2022-01-01 RX ADMIN — SODIUM CHLORIDE 1 G: 900 INJECTION INTRAVENOUS at 18:43

## 2022-01-01 RX ADMIN — CEFEPIME 1 G: 1 INJECTION, POWDER, FOR SOLUTION INTRAMUSCULAR; INTRAVENOUS at 11:56

## 2022-01-01 RX ADMIN — METRONIDAZOLE 500 MG: 500 INJECTION, SOLUTION INTRAVENOUS at 09:42

## 2022-01-01 RX ADMIN — HEPARIN SODIUM 5000 UNITS: 5000 INJECTION INTRAVENOUS; SUBCUTANEOUS at 22:14

## 2022-01-01 RX ADMIN — HYDRALAZINE HYDROCHLORIDE 10 MG: 20 INJECTION INTRAMUSCULAR; INTRAVENOUS at 23:05

## 2022-01-01 RX ADMIN — LEVETIRACETAM 1000 MG: 100 INJECTION, SOLUTION, CONCENTRATE INTRAVENOUS at 21:38

## 2022-01-01 RX ADMIN — HEPARIN SODIUM 5000 UNITS: 5000 INJECTION INTRAVENOUS; SUBCUTANEOUS at 21:07

## 2022-01-01 RX ADMIN — HEPARIN SODIUM 5000 UNITS: 5000 INJECTION INTRAVENOUS; SUBCUTANEOUS at 13:35

## 2022-01-01 RX ADMIN — CASTOR OIL AND BALSAM, PERU: 788; 87 OINTMENT TOPICAL at 21:35

## 2022-01-01 RX ADMIN — METRONIDAZOLE 500 MG: 500 INJECTION, SOLUTION INTRAVENOUS at 09:36

## 2022-01-01 RX ADMIN — METRONIDAZOLE 500 MG: 500 INJECTION, SOLUTION INTRAVENOUS at 09:46

## 2022-01-01 RX ADMIN — DEXTROSE MONOHYDRATE 75 ML/HR: 50 INJECTION, SOLUTION INTRAVENOUS at 02:49

## 2022-01-01 RX ADMIN — LABETALOL HYDROCHLORIDE 10 MG: 5 INJECTION, SOLUTION INTRAVENOUS at 22:18

## 2022-01-01 RX ADMIN — HEPARIN SODIUM 5000 UNITS: 5000 INJECTION INTRAVENOUS; SUBCUTANEOUS at 21:47

## 2022-01-01 RX ADMIN — DEXTROSE AND SODIUM CHLORIDE 75 ML/HR: 5; 450 INJECTION, SOLUTION INTRAVENOUS at 15:42

## 2022-01-01 RX ADMIN — METRONIDAZOLE 500 MG: 500 INJECTION, SOLUTION INTRAVENOUS at 21:14

## 2022-01-01 RX ADMIN — HEPARIN SODIUM 5000 UNITS: 5000 INJECTION INTRAVENOUS; SUBCUTANEOUS at 15:45

## 2022-01-01 RX ADMIN — HEPARIN SODIUM 5000 UNITS: 5000 INJECTION INTRAVENOUS; SUBCUTANEOUS at 05:03

## 2022-01-01 RX ADMIN — HYDRALAZINE HYDROCHLORIDE 10 MG: 20 INJECTION INTRAMUSCULAR; INTRAVENOUS at 05:11

## 2022-01-01 RX ADMIN — LABETALOL HYDROCHLORIDE 10 MG: 5 INJECTION INTRAVENOUS at 12:39

## 2022-01-01 RX ADMIN — DEXTROSE MONOHYDRATE 75 ML/HR: 50 INJECTION, SOLUTION INTRAVENOUS at 11:59

## 2022-01-01 RX ADMIN — LEVETIRACETAM 1000 MG: 100 INJECTION, SOLUTION, CONCENTRATE INTRAVENOUS at 10:55

## 2022-01-01 RX ADMIN — CASTOR OIL AND BALSAM, PERU: 788; 87 OINTMENT TOPICAL at 13:01

## 2022-01-01 RX ADMIN — AMLODIPINE BESYLATE 10 MG: 5 TABLET ORAL at 10:50

## 2022-01-01 RX ADMIN — HYDRALAZINE HYDROCHLORIDE 10 MG: 20 INJECTION INTRAMUSCULAR; INTRAVENOUS at 18:30

## 2022-01-01 RX ADMIN — HYDRALAZINE HYDROCHLORIDE 10 MG: 20 INJECTION INTRAMUSCULAR; INTRAVENOUS at 17:45

## 2022-01-01 RX ADMIN — HEPARIN SODIUM 5000 UNITS: 5000 INJECTION INTRAVENOUS; SUBCUTANEOUS at 05:14

## 2022-01-01 RX ADMIN — CLOPIDOGREL BISULFATE 75 MG: 75 TABLET ORAL at 10:50

## 2022-01-01 RX ADMIN — HYDRALAZINE HYDROCHLORIDE 10 MG: 20 INJECTION INTRAMUSCULAR; INTRAVENOUS at 17:08

## 2022-01-01 RX ADMIN — HYDRALAZINE HYDROCHLORIDE 10 MG: 20 INJECTION INTRAMUSCULAR; INTRAVENOUS at 11:38

## 2022-01-01 RX ADMIN — VANCOMYCIN HYDROCHLORIDE 1750 MG: 10 INJECTION, POWDER, LYOPHILIZED, FOR SOLUTION INTRAVENOUS at 13:43

## 2022-01-01 RX ADMIN — DEXTROSE MONOHYDRATE 75 ML/HR: 50 INJECTION, SOLUTION INTRAVENOUS at 14:00

## 2022-01-01 RX ADMIN — LABETALOL HYDROCHLORIDE 10 MG: 5 INJECTION, SOLUTION INTRAVENOUS at 10:02

## 2022-01-01 RX ADMIN — LABETALOL HYDROCHLORIDE 10 MG: 5 INJECTION, SOLUTION INTRAVENOUS at 12:02

## 2022-01-01 RX ADMIN — LABETALOL HYDROCHLORIDE 10 MG: 5 INJECTION INTRAVENOUS at 13:42

## 2022-01-01 RX ADMIN — LABETALOL HYDROCHLORIDE 10 MG: 5 INJECTION INTRAVENOUS at 23:31

## 2022-01-01 RX ADMIN — HEPARIN SODIUM 5000 UNITS: 5000 INJECTION INTRAVENOUS; SUBCUTANEOUS at 06:30

## 2022-01-01 RX ADMIN — HYDRALAZINE HYDROCHLORIDE 10 MG: 20 INJECTION INTRAMUSCULAR; INTRAVENOUS at 05:54

## 2022-01-01 RX ADMIN — METRONIDAZOLE 500 MG: 500 INJECTION, SOLUTION INTRAVENOUS at 10:14

## 2022-01-01 RX ADMIN — SODIUM CHLORIDE 1 G: 900 INJECTION INTRAVENOUS at 18:41

## 2022-01-01 RX ADMIN — THIAMINE HYDROCHLORIDE 200 MG: 100 INJECTION, SOLUTION INTRAMUSCULAR; INTRAVENOUS at 15:04

## 2022-01-01 RX ADMIN — CEFEPIME 1 G: 1 INJECTION, POWDER, FOR SOLUTION INTRAMUSCULAR; INTRAVENOUS at 11:40

## 2022-01-01 RX ADMIN — METRONIDAZOLE 500 MG: 500 INJECTION, SOLUTION INTRAVENOUS at 09:06

## 2022-01-01 RX ADMIN — HYDRALAZINE HYDROCHLORIDE 20 MG: 20 INJECTION INTRAMUSCULAR; INTRAVENOUS at 09:05

## 2022-01-01 RX ADMIN — HEPARIN SODIUM 5000 UNITS: 5000 INJECTION INTRAVENOUS; SUBCUTANEOUS at 06:25

## 2022-01-01 RX ADMIN — LABETALOL HYDROCHLORIDE 10 MG: 5 INJECTION, SOLUTION INTRAVENOUS at 14:02

## 2022-01-01 RX ADMIN — HYDRALAZINE HYDROCHLORIDE 10 MG: 20 INJECTION INTRAMUSCULAR; INTRAVENOUS at 00:09

## 2022-01-01 RX ADMIN — HYDRALAZINE HYDROCHLORIDE 10 MG: 20 INJECTION INTRAMUSCULAR; INTRAVENOUS at 06:34

## 2022-01-01 RX ADMIN — HEPARIN SODIUM 5000 UNITS: 5000 INJECTION INTRAVENOUS; SUBCUTANEOUS at 22:16

## 2022-01-01 RX ADMIN — THIAMINE HYDROCHLORIDE 200 MG: 100 INJECTION, SOLUTION INTRAMUSCULAR; INTRAVENOUS at 10:37

## 2022-01-01 RX ADMIN — HYDRALAZINE HYDROCHLORIDE 10 MG: 20 INJECTION INTRAMUSCULAR; INTRAVENOUS at 14:57

## 2022-01-01 RX ADMIN — HEPARIN SODIUM 5000 UNITS: 5000 INJECTION INTRAVENOUS; SUBCUTANEOUS at 22:09

## 2022-01-01 RX ADMIN — LABETALOL HYDROCHLORIDE 10 MG: 5 INJECTION INTRAVENOUS at 03:32

## 2022-01-01 RX ADMIN — HYDRALAZINE HYDROCHLORIDE 10 MG: 20 INJECTION INTRAMUSCULAR; INTRAVENOUS at 05:03

## 2022-01-01 RX ADMIN — HEPARIN SODIUM 5000 UNITS: 5000 INJECTION INTRAVENOUS; SUBCUTANEOUS at 13:46

## 2022-01-01 RX ADMIN — LABETALOL HYDROCHLORIDE 10 MG: 5 INJECTION, SOLUTION INTRAVENOUS at 18:32

## 2022-01-01 RX ADMIN — LINEZOLID 600 MG: 600 INJECTION, SOLUTION INTRAVENOUS at 13:33

## 2022-01-01 RX ADMIN — HYDRALAZINE HYDROCHLORIDE 10 MG: 20 INJECTION INTRAMUSCULAR; INTRAVENOUS at 17:00

## 2022-01-01 RX ADMIN — THIAMINE HYDROCHLORIDE 200 MG: 100 INJECTION, SOLUTION INTRAMUSCULAR; INTRAVENOUS at 12:03

## 2022-01-01 RX ADMIN — CEFEPIME 1 G: 1 INJECTION, POWDER, FOR SOLUTION INTRAMUSCULAR; INTRAVENOUS at 13:44

## 2022-01-01 RX ADMIN — HEPARIN SODIUM 5000 UNITS: 5000 INJECTION INTRAVENOUS; SUBCUTANEOUS at 05:09

## 2022-01-01 RX ADMIN — LABETALOL HYDROCHLORIDE 10 MG: 5 INJECTION, SOLUTION INTRAVENOUS at 10:14

## 2022-01-01 RX ADMIN — LABETALOL HYDROCHLORIDE 10 MG: 5 INJECTION, SOLUTION INTRAVENOUS at 17:49

## 2022-01-01 RX ADMIN — HYDRALAZINE HYDROCHLORIDE 10 MG: 20 INJECTION INTRAMUSCULAR; INTRAVENOUS at 23:56

## 2022-01-01 RX ADMIN — METRONIDAZOLE 500 MG: 500 INJECTION, SOLUTION INTRAVENOUS at 20:56

## 2022-01-01 RX ADMIN — HYDRALAZINE HYDROCHLORIDE 10 MG: 20 INJECTION INTRAMUSCULAR; INTRAVENOUS at 18:42

## 2022-01-01 RX ADMIN — METRONIDAZOLE 500 MG: 500 INJECTION, SOLUTION INTRAVENOUS at 20:22

## 2022-01-01 RX ADMIN — HYDRALAZINE HYDROCHLORIDE 10 MG: 20 INJECTION INTRAMUSCULAR; INTRAVENOUS at 00:58

## 2022-01-01 RX ADMIN — HEPARIN SODIUM 5000 UNITS: 5000 INJECTION INTRAVENOUS; SUBCUTANEOUS at 05:16

## 2022-01-01 RX ADMIN — HYDRALAZINE HYDROCHLORIDE 10 MG: 20 INJECTION INTRAMUSCULAR; INTRAVENOUS at 00:23

## 2022-01-01 RX ADMIN — HYDRALAZINE HYDROCHLORIDE 10 MG: 20 INJECTION INTRAMUSCULAR; INTRAVENOUS at 06:13

## 2022-01-01 RX ADMIN — HEPARIN SODIUM 5000 UNITS: 5000 INJECTION INTRAVENOUS; SUBCUTANEOUS at 22:54

## 2022-01-01 RX ADMIN — HYDRALAZINE HYDROCHLORIDE 10 MG: 20 INJECTION INTRAMUSCULAR; INTRAVENOUS at 23:44

## 2022-01-01 RX ADMIN — HEPARIN SODIUM 5000 UNITS: 5000 INJECTION INTRAVENOUS; SUBCUTANEOUS at 14:57

## 2022-01-01 RX ADMIN — HYDRALAZINE HYDROCHLORIDE 10 MG: 20 INJECTION INTRAMUSCULAR; INTRAVENOUS at 13:38

## 2022-01-01 RX ADMIN — HYDRALAZINE HYDROCHLORIDE 10 MG: 20 INJECTION INTRAMUSCULAR; INTRAVENOUS at 17:38

## 2022-01-01 RX ADMIN — HEPARIN SODIUM 5000 UNITS: 5000 INJECTION INTRAVENOUS; SUBCUTANEOUS at 13:36

## 2022-01-01 RX ADMIN — CEFEPIME 1 G: 1 INJECTION, POWDER, FOR SOLUTION INTRAMUSCULAR; INTRAVENOUS at 10:00

## 2022-01-01 RX ADMIN — HYDRALAZINE HYDROCHLORIDE 10 MG: 20 INJECTION INTRAMUSCULAR; INTRAVENOUS at 18:20

## 2022-01-01 RX ADMIN — CEFEPIME 1 G: 1 INJECTION, POWDER, FOR SOLUTION INTRAMUSCULAR; INTRAVENOUS at 12:03

## 2022-01-01 RX ADMIN — HEPARIN SODIUM 5000 UNITS: 5000 INJECTION INTRAVENOUS; SUBCUTANEOUS at 13:50

## 2022-01-01 RX ADMIN — HEPARIN SODIUM 5000 UNITS: 5000 INJECTION INTRAVENOUS; SUBCUTANEOUS at 22:04

## 2022-01-01 RX ADMIN — LABETALOL HYDROCHLORIDE 10 MG: 5 INJECTION, SOLUTION INTRAVENOUS at 14:57

## 2022-01-01 RX ADMIN — METRONIDAZOLE 500 MG: 500 INJECTION, SOLUTION INTRAVENOUS at 20:41

## 2022-01-01 RX ADMIN — LABETALOL HYDROCHLORIDE 10 MG: 5 INJECTION, SOLUTION INTRAVENOUS at 18:18

## 2022-01-01 RX ADMIN — AMLODIPINE BESYLATE 10 MG: 5 TABLET ORAL at 09:40

## 2022-01-01 RX ADMIN — METRONIDAZOLE 500 MG: 500 INJECTION, SOLUTION INTRAVENOUS at 08:57

## 2022-01-01 RX ADMIN — LABETALOL HYDROCHLORIDE 10 MG: 5 INJECTION, SOLUTION INTRAVENOUS at 18:20

## 2022-01-01 RX ADMIN — SODIUM CHLORIDE 100 ML/HR: 9 INJECTION, SOLUTION INTRAVENOUS at 17:58

## 2022-01-01 RX ADMIN — HYDRALAZINE HYDROCHLORIDE 10 MG: 20 INJECTION INTRAMUSCULAR; INTRAVENOUS at 18:18

## 2022-01-01 RX ADMIN — HEPARIN SODIUM 5000 UNITS: 5000 INJECTION INTRAVENOUS; SUBCUTANEOUS at 14:26

## 2022-01-01 RX ADMIN — HEPARIN SODIUM 5000 UNITS: 5000 INJECTION INTRAVENOUS; SUBCUTANEOUS at 22:05

## 2022-01-01 RX ADMIN — LABETALOL HYDROCHLORIDE 10 MG: 5 INJECTION, SOLUTION INTRAVENOUS at 18:42

## 2022-01-01 RX ADMIN — HYDRALAZINE HYDROCHLORIDE 10 MG: 20 INJECTION INTRAMUSCULAR; INTRAVENOUS at 01:35

## 2022-01-01 RX ADMIN — HYDRALAZINE HYDROCHLORIDE 10 MG: 20 INJECTION INTRAMUSCULAR; INTRAVENOUS at 06:36

## 2022-01-01 RX ADMIN — SODIUM CHLORIDE 1 G: 900 INJECTION INTRAVENOUS at 20:15

## 2022-01-01 RX ADMIN — LINEZOLID 600 MG: 600 INJECTION, SOLUTION INTRAVENOUS at 05:15

## 2022-01-01 RX ADMIN — HEPARIN SODIUM 5000 UNITS: 5000 INJECTION INTRAVENOUS; SUBCUTANEOUS at 14:03

## 2022-01-01 RX ADMIN — VANCOMYCIN HYDROCHLORIDE 750 MG: 750 INJECTION, POWDER, LYOPHILIZED, FOR SOLUTION INTRAVENOUS at 13:46

## 2022-01-01 RX ADMIN — HYDRALAZINE HYDROCHLORIDE 10 MG: 20 INJECTION INTRAMUSCULAR; INTRAVENOUS at 06:24

## 2022-01-01 RX ADMIN — METOPROLOL TARTRATE 5 MG: 5 INJECTION INTRAVENOUS at 15:44

## 2022-01-01 RX ADMIN — LABETALOL HYDROCHLORIDE 10 MG: 5 INJECTION INTRAVENOUS at 09:52

## 2022-01-01 RX ADMIN — METRONIDAZOLE 500 MG: 500 INJECTION, SOLUTION INTRAVENOUS at 21:46

## 2022-01-01 RX ADMIN — THIAMINE HYDROCHLORIDE 200 MG: 100 INJECTION, SOLUTION INTRAMUSCULAR; INTRAVENOUS at 10:05

## 2022-01-01 RX ADMIN — LABETALOL HYDROCHLORIDE 10 MG: 5 INJECTION, SOLUTION INTRAVENOUS at 13:45

## 2022-01-01 RX ADMIN — LABETALOL HYDROCHLORIDE 10 MG: 5 INJECTION, SOLUTION INTRAVENOUS at 22:01

## 2022-01-01 RX ADMIN — HYDRALAZINE HYDROCHLORIDE 10 MG: 20 INJECTION INTRAMUSCULAR; INTRAVENOUS at 05:23

## 2022-01-01 RX ADMIN — HYDRALAZINE HYDROCHLORIDE 10 MG: 20 INJECTION INTRAMUSCULAR; INTRAVENOUS at 12:02

## 2022-01-01 RX ADMIN — HEPARIN SODIUM 5000 UNITS: 5000 INJECTION INTRAVENOUS; SUBCUTANEOUS at 06:34

## 2022-01-01 RX ADMIN — LABETALOL HYDROCHLORIDE 10 MG: 5 INJECTION INTRAVENOUS at 16:57

## 2022-01-01 RX ADMIN — HEPARIN SODIUM 5000 UNITS: 5000 INJECTION INTRAVENOUS; SUBCUTANEOUS at 14:01

## 2022-01-01 RX ADMIN — CEFEPIME 1 G: 1 INJECTION, POWDER, FOR SOLUTION INTRAMUSCULAR; INTRAVENOUS at 23:49

## 2022-01-01 RX ADMIN — HEPARIN SODIUM 5000 UNITS: 5000 INJECTION INTRAVENOUS; SUBCUTANEOUS at 06:36

## 2022-01-01 RX ADMIN — HEPARIN SODIUM 5000 UNITS: 5000 INJECTION INTRAVENOUS; SUBCUTANEOUS at 06:00

## 2022-01-01 RX ADMIN — LABETALOL HYDROCHLORIDE 10 MG: 5 INJECTION, SOLUTION INTRAVENOUS at 22:04

## 2022-01-01 RX ADMIN — THIAMINE HYDROCHLORIDE 200 MG: 100 INJECTION, SOLUTION INTRAMUSCULAR; INTRAVENOUS at 10:53

## 2022-01-01 RX ADMIN — ASPIRIN 81 MG: 81 TABLET, CHEWABLE ORAL at 10:50

## 2022-01-01 RX ADMIN — HEPARIN SODIUM 5000 UNITS: 5000 INJECTION INTRAVENOUS; SUBCUTANEOUS at 21:35

## 2022-01-01 RX ADMIN — SODIUM CHLORIDE 1 G: 900 INJECTION INTRAVENOUS at 20:01

## 2022-01-01 RX ADMIN — HEPARIN SODIUM 5000 UNITS: 5000 INJECTION INTRAVENOUS; SUBCUTANEOUS at 21:11

## 2022-01-01 RX ADMIN — HEPARIN SODIUM 5000 UNITS: 5000 INJECTION INTRAVENOUS; SUBCUTANEOUS at 14:45

## 2022-01-01 RX ADMIN — HEPARIN SODIUM 5000 UNITS: 5000 INJECTION INTRAVENOUS; SUBCUTANEOUS at 06:53

## 2022-01-01 RX ADMIN — HEPARIN SODIUM 5000 UNITS: 5000 INJECTION INTRAVENOUS; SUBCUTANEOUS at 21:41

## 2022-01-01 RX ADMIN — ATORVASTATIN CALCIUM 40 MG: 40 TABLET, FILM COATED ORAL at 21:46

## 2022-01-01 RX ADMIN — METRONIDAZOLE 500 MG: 500 INJECTION, SOLUTION INTRAVENOUS at 20:52

## 2022-01-01 RX ADMIN — CEFEPIME 1 G: 1 INJECTION, POWDER, FOR SOLUTION INTRAMUSCULAR; INTRAVENOUS at 23:43

## 2022-01-01 RX ADMIN — HYDRALAZINE HYDROCHLORIDE 10 MG: 20 INJECTION INTRAMUSCULAR; INTRAVENOUS at 17:50

## 2022-01-01 RX ADMIN — HYDRALAZINE HYDROCHLORIDE 20 MG: 20 INJECTION INTRAMUSCULAR; INTRAVENOUS at 05:17

## 2022-01-01 RX ADMIN — LABETALOL HYDROCHLORIDE 10 MG: 5 INJECTION, SOLUTION INTRAVENOUS at 22:17

## 2022-08-23 PROBLEM — R47.01 APHASIA DUE TO ACUTE CEREBROVASCULAR ACCIDENT (CVA) (HCC): Status: ACTIVE | Noted: 2022-01-01

## 2022-08-23 PROBLEM — N17.9 AKI (ACUTE KIDNEY INJURY) (HCC): Status: ACTIVE | Noted: 2022-01-01

## 2022-08-23 PROBLEM — R41.82 ACUTE ALTERATION IN MENTAL STATUS: Status: ACTIVE | Noted: 2022-01-01

## 2022-08-23 PROBLEM — C61 PROSTATE CANCER (HCC): Status: ACTIVE | Noted: 2022-01-01

## 2022-08-23 PROBLEM — R56.9 CONVULSIONS (HCC): Status: ACTIVE | Noted: 2022-01-01

## 2022-08-23 PROBLEM — Z86.73 HISTORY OF STROKE: Status: ACTIVE | Noted: 2022-01-01

## 2022-08-23 PROBLEM — G93.40 ACUTE ENCEPHALOPATHY: Status: ACTIVE | Noted: 2022-01-01

## 2022-08-23 PROBLEM — I63.9 APHASIA DUE TO ACUTE CEREBROVASCULAR ACCIDENT (CVA) (HCC): Status: ACTIVE | Noted: 2022-01-01

## 2022-08-23 PROBLEM — I63.9 CVA (CEREBRAL VASCULAR ACCIDENT) (HCC): Status: ACTIVE | Noted: 2022-01-01

## 2022-08-23 PROBLEM — I63.312 THROMBOTIC STROKE INVOLVING LEFT MIDDLE CEREBRAL ARTERY (HCC): Status: ACTIVE | Noted: 2022-01-01

## 2022-08-23 PROBLEM — I65.23 BILATERAL CAROTID ARTERY STENOSIS: Status: ACTIVE | Noted: 2022-01-01

## 2022-08-23 PROBLEM — I16.0 HYPERTENSIVE URGENCY: Status: ACTIVE | Noted: 2022-01-01

## 2022-08-23 NOTE — CONSULTS
Consult  REFERRED BY:  None    CHIEF COMPLAINT: Sudden loss of speech on awakening this morning      Subjective:     Shara Sánchez is a 71 y.o. right-handed -American male, seen as a new patient, at the request of the hospitalist, for evaluation of new problem of sudden loss of speech that was present when he awoke this morning, he is not able to say any words since then. The ex-wife has not noticed any new focal weakness or sensory loss, they have been able to walk and move about okay. He did have a previous stroke about 22 years ago, but did not have any significant residual weakness after that. She apparently picked him up about 9 months ago in [place] where he was found on the streets wandering, living by himself but not taking care of himself, and she brought him back to [place] has not seen a physician since then because he refuses to go and is taking no medication. He has been started on aspirin therapy and high-dose statin for now. He had a CTA of the head neck that was negative except for some left posterior cerebral artery stenosis, and a CT of the head that was unremarkable except for old microvascular disease. His Dopplers and MRI are pending. He has been no complaints of chest pain or any other head trauma, fever, meningismus, or other precipitating causes for his event. He does not appear to have made any improvement as it is going on. Past Medical History:   Diagnosis Date    History of stroke     Prostate [facility])       History reviewed. No pertinent surgical history. History reviewed. No pertinent family history.    Social History     Tobacco Use    Smoking status: Never    Smokeless tobacco: Never   Substance Use Topics    Alcohol use: Not on file         Current Facility-Administered Medications:     acetaminophen (TYLENOL) tablet 650 mg, 650 mg, Oral, Q4H PRN **OR** acetaminophen (TYLENOL) solution 650 mg, 650 mg, Per NG tube, Q4H PRN **OR** acetaminophen (TYLENOL) suppository 650 mg, 650 mg, Rectal, Q4H PRN, Laci Landis MD    [START ON 8/24/2022] aspirin chewable tablet 81 mg, 81 mg, Oral, DAILY, Corrine Rondon MD    atorvastatin (LIPITOR) tablet 40 mg, 40 mg, Oral, QHS, Laci Landis MD    heparin (porcine) injection 5,000 Units, 5,000 Units, SubCUTAneous, Q8H, Corrine Rondon MD    0.9% sodium chloride infusion, 100 mL/hr, IntraVENous, CONTINUOUS, Corrine Rondon MD    cefTRIAXone (ROCEPHIN) 1 g in 0.9% sodium chloride (MBP/ADV) 50 mL MBP, 1 g, IntraVENous, Q24H, Corrine Rondon MD    levETIRAcetam (KEPPRA) injection 1,000 mg, 1,000 mg, IntraVENous, Q12H, Laci Landis MD    Please enter Patient allergies when able, 1 Each, Other, ONCE, Laci Landis MD        No Known Allergies   MRI Results (most recent):  No results found for this or any previous visit. No results found for this or any previous visit. Review of Systems:  Review of systems not obtained due to patient factors. Vitals:    08/23/22 1218 08/23/22 1230 08/23/22 1318 08/23/22 1534   BP: (!) 181/113 (!) 197/133 (!) 213/115 (!) 201/110   Pulse: 99 97 (!) 102 100   Resp: 14 18 16 20   Temp:    98 °F (36.7 °C)   SpO2: 97% 98% 96% 98%     Objective:     I      NEUROLOGICAL EXAM:    Appearance: The patient is well developed, well nourished, aphasic and provides no history   Mental Status: Aphasic and provides no history, may be able to get the word yes out once   Cranial Nerves:   Unreliable visual fields. Fundi are benign, disc are flat, no lesions seen on funduscopy. CRISTOBAL, EOM's full, no nystagmus, no ptosis. Facial sensation is normal. Corneal reflexes are not tested. Facial movement is show central facial weakness on the right. Hearing is abnormal bilaterally. Palate is midline with normal sternocleidomastoid and trapezius muscles are normal. Tongue is midline.   Neck without meningismus or bruits  Temporal arteries are not tender or enlarged  TMJ areas are not tender on palpation   Motor: 4/5 strength in upper and lower proximal and distal muscles. Normal bulk and tone. No fasciculations. Rapid alternating movement is symmetric and intact bilaterally   Reflexes:   Deep tendon reflexes 1+/4 and symmetrical.  No babinski or clonus present   Sensory:   Normal to touch, pinprick and vibration and temperature are unreliable. DSS is unreliable   Gait:  Not tested   Tremor:   No tremor noted. Cerebellar:  Not tested abnormal cerebellar signs present on Romberg and tandem testing and finger-nose-finger exam.   Neurovascular:  Normal heart sounds and regular rhythm, peripheral pulses decreased and no carotid bruits. Assessment:       ICD-10-CM ICD-9-CM    1. Acute encephalopathy  G93.40 348.30       2. Acute cystitis with hematuria  N30.01 595.0         Active Problems:    CVA (cerebral vascular accident) ([address] (8/23/2022)      VINICIO (acute kidney injury) ([address] (8/23/2022)      Hypertensive urgency (8/23/2022)      Acute encephalopathy (8/23/2022)      Bilateral carotid artery stenosis (8/23/2022)      Thrombotic stroke involving left middle cerebral artery ([address] (8/23/2022)      Aphasia due to acute cerebrovascular accident (CVA) ([address] (8/23/2022)      Acute alteration in mental status (8/23/2022)      Convulsions ([address] (8/23/2022)      History of stroke (8/23/2022)        Plan:     Patient with sudden onset aphasia, most likely secondary to CVA, will await the MRI scan, continue aspirin therapy and high-dose statin for now, we will follow carefully with you, have discussed with the wife in detail, and he obviously needs to get a medical doctor here because of his recurrent stroke again. May need inpatient rehab. Need to rule out cardiac source so complete neurovascular work-up has been ordered. Will check Doppler and EEG also.   I reviewed his CTA and CT personally, agree with reports as above, they are unremarkable and I discussed his condition with the patient and his wife and discussed treatment options and plans with her in detail also.     Signed By: Randee Ortiz MD     August 23, 2022       CC: None  FAX: None

## 2022-08-23 NOTE — PROGRESS NOTES
Pharmacy Medication Reconciliation     The patient was interviewed regarding current PTA medication list, use and drug allergies;  patient present in room and obtained permission from patient to discuss drug regimen with visitor(s) present. The patient was questioned regarding use of any other inhalers, topical products, over the counter medications, herbal medications, vitamin products or ophthalmic/nasal/otic medication use. Allergy Update: Patient has no allergy information on record. Recommendations/Findings: The following amendments were made to the patient's active medication list on file at [facility]:   1) Additions:  none  2) Deletions:  none  3) Changes:  none  Pertinent Findings:  none    Clarified PTA med list with rx query, patient interview with wife.  PTA medication list was corrected to the following:     None        Thank you,  ELNIOR King

## 2022-08-23 NOTE — ED NOTES
Per Dr. Sree Maldonado. Continue with scan if tele neuro does not come on within 5 minutes. 11:50 - CT scan continued. As ct was starting, tele neuro came on.

## 2022-08-23 NOTE — ROUTINE PROCESS

## 2022-08-23 NOTE — ED PROVIDER NOTES
EMERGENCY DEPARTMENT HISTORY AND PHYSICAL EXAM      Date: 8/23/2022  Patient Name: Wilber Cano  Patient Age and Sex: 71 y.o. male     History of Presenting Illness     Chief Complaint   Patient presents with    Extremity Weakness     Pt presents to the ED via EMS with complaints of left sided weakness and aphasia. LKW was 19:00 8/22. Pt's family found him at 10:00. History Provided By: EMS, family    HPI: Wilber Cano is a 23-year-old history of stroke, diabetes, no known other past medical history presenting with acute aphasia and weakness. Patient last seen normal at 7 PM yesterday. He was found by caregivers this morning at 10 AM with left-sided weakness and aphasia which were new. Per EMS he was noted to have a right-sided facial droop as well. BGL 34 in route however repeat , significantly hypertensive in route to 354X systolic. There are no other complaints, changes, or physical findings at this time. PCP: None    No current facility-administered medications on file prior to encounter. No current outpatient medications on file prior to encounter. Past History     Past Medical History:  No past medical history on file. Stroke, diabetes    Past Surgical History:  No past surgical history on file. No known    Family History:  No family history on file. Social History:     Recently moved from [place], lives with family    Allergies:  Not on File    Past medical surgical family and social history limited due to acute altered mental status    Review of Systems   Review of Systems   Unable to perform ROS: Mental status change      Physical Exam   Physical Exam  Vitals and nursing note reviewed. Constitutional:       Appearance: He is not ill-appearing. HENT:      Head: Normocephalic and atraumatic. Mouth/Throat:      Mouth: Mucous membranes are moist.   Eyes:      Conjunctiva/sclera: Conjunctivae normal.   Cardiovascular:      Rate and Rhythm: Regular rhythm. Tachycardia present. Heart sounds: Murmur heard. Pulmonary:      Effort: Pulmonary effort is normal.      Breath sounds: Normal breath sounds. Abdominal:      General: Abdomen is flat. Tenderness: There is no abdominal tenderness. Musculoskeletal:         General: No deformity. Skin:     General: Skin is warm and dry. Neurological:      Mental Status: He is alert. GCS: GCS eye subscore is 4. GCS verbal subscore is 2. GCS motor subscore is 6. Cranial Nerves: Facial asymmetry present. Comments: Right-sided facial droop. No visual field deficit, extraocular movement is intact. Sensation is intact bilaterally. Subtle  strength deficit in left arm, decreased strength left plantar flexion. Psychiatric:         Behavior: Behavior normal.        Diagnostic Study Results     Labs     Recent Results (from the past 12 hour(s))   GLUCOSE, POC    Collection Time: 08/23/22 11:31 AM   Result Value Ref Range    Glucose (POC) 103 65 - 117 mg/dL    Performed by Vinicius Somers \"[facility]\" EDT    CBC WITH AUTOMATED DIFF    Collection Time: 08/23/22 11:53 AM   Result Value Ref Range    WBC 3.7 (L) 4.1 - 11.1 K/uL    RBC 3.08 (L) 4.10 - 5.70 M/uL    HGB 9.7 (L) 12.1 - 17.0 g/dL    HCT 29.4 (L) 36.6 - 50.3 %    MCV 95.5 80.0 - 99.0 FL    MCH 31.5 26.0 - 34.0 PG    MCHC 33.0 30.0 - 36.5 g/dL    RDW 12.3 11.5 - 14.5 %    PLATELET 074 323 - 167 K/uL    MPV 9.6 8.9 - 12.9 FL    NRBC 0.0 0  WBC    ABSOLUTE NRBC 0.00 0.00 - 0.01 K/uL    NEUTROPHILS 65 32 - 75 %    LYMPHOCYTES 26 12 - 49 %    MONOCYTES 5 5 - 13 %    EOSINOPHILS 4 0 - 7 %    BASOPHILS 0 0 - 1 %    IMMATURE GRANULOCYTES 0 0.0 - 0.5 %    ABS. NEUTROPHILS 2.4 1.8 - 8.0 K/UL    ABS. LYMPHOCYTES 1.0 0.8 - 3.5 K/UL    ABS. MONOCYTES 0.2 0.0 - 1.0 K/UL    ABS. EOSINOPHILS 0.1 0.0 - 0.4 K/UL    ABS. BASOPHILS 0.0 0.0 - 0.1 K/UL    ABS. IMM.  GRANS. 0.0 0.00 - 0.04 K/UL    DF AUTOMATED     METABOLIC PANEL, COMPREHENSIVE    Collection Time: 08/23/22 11:53 AM   Result Value Ref Range    Sodium 144 136 - 145 mmol/L    Potassium 4.0 3.5 - 5.1 mmol/L    Chloride 111 (H) 97 - 108 mmol/L    CO2 28 21 - 32 mmol/L    Anion gap 5 5 - 15 mmol/L    Glucose 104 (H) 65 - 100 mg/dL    BUN 33 (H) 6 - 20 MG/DL    Creatinine 3.22 (H) 0.70 - 1.30 MG/DL    BUN/Creatinine ratio 10 (L) 12 - 20      GFR est AA 23 (L) >60 ml/min/1.73m2    GFR est non-AA 19 (L) >60 ml/min/1.73m2    Calcium 9.1 8.5 - 10.1 MG/DL    Bilirubin, total 0.7 0.2 - 1.0 MG/DL    ALT (SGPT) 20 12 - 78 U/L    AST (SGOT) 14 (L) 15 - 37 U/L    Alk.  phosphatase 114 45 - 117 U/L    Protein, total 6.9 6.4 - 8.2 g/dL    Albumin 3.6 3.5 - 5.0 g/dL    Globulin 3.3 2.0 - 4.0 g/dL    A-G Ratio 1.1 1.1 - 2.2     PROTHROMBIN TIME + INR    Collection Time: 08/23/22 11:53 AM   Result Value Ref Range    INR 1.0 0.9 - 1.1      Prothrombin time 10.8 9.0 - 11.1 sec   BLOOD GAS,CHEM8,LACTIC ACID POC    Collection Time: 08/23/22 11:57 AM   Result Value Ref Range    Calcium, ionized (POC) 1.24 1.12 - 1.32 mmol/L    BICARBONATE 27 mmol/L    Base deficit (POC) 0.3 mmol/L    Sample source VENOUS BLOOD      CO2, POC 27 (H) 19 - 24 MMOL/L    Sodium,  (H) 136 - 145 MMOL/L    Potassium, POC 3.9 3.5 - 5.5 MMOL/L    Chloride,  (H) 100 - 108 MMOL/L    Glucose, POC 98 74 - 106 MG/DL    Creatinine, POC 3.7 (H) 0.6 - 1.3 MG/DL    Lactic Acid (POC) 1.58 0.40 - 2.00 mmol/L    pH, venous (POC) 7.30 (L) 7.32 - 7.42      pCO2, venous (POC) 54.6 (H) 41 - 51 MMHG    pO2, venous (POC) 21 (L) 25 - 40 mmHg   EKG, 12 LEAD, INITIAL    Collection Time: 08/23/22  1:10 PM   Result Value Ref Range    Ventricular Rate 101 BPM    Atrial Rate 101 BPM    P-R Interval 210 ms    QRS Duration 82 ms    Q-T Interval 322 ms    QTC Calculation (Bezet) 417 ms    Calculated P Axis 76 degrees    Calculated R Axis 41 degrees    Calculated T Axis -32 degrees    Diagnosis       Sinus tachycardia with 1st degree AV block  Nonspecific ST and T wave abnormality  No previous ECGs available     URINALYSIS W/ REFLEX CULTURE    Collection Time: 08/23/22  1:42 PM    Specimen: Urine   Result Value Ref Range    Color YELLOW/STRAW      Appearance CLEAR CLEAR      Specific gravity 1.029      pH (UA) 5.5 5.0 - 8.0      Protein 100 (A) NEG mg/dL    Glucose Negative NEG mg/dL    Ketone Negative NEG mg/dL    Bilirubin Negative NEG      Blood SMALL (A) NEG      Urobilinogen 0.2 0.2 - 1.0 EU/dL    Nitrites Negative NEG      Leukocyte Esterase Negative NEG      WBC 5-10 0 - 4 /hpf    RBC 5-10 0 - 5 /hpf    Epithelial cells MODERATE (A) FEW /lpf    Bacteria 1+ (A) NEG /hpf    UA:UC IF INDICATED CULTURE NOT INDICATED BY UA RESULT CNI      Yeast PRESENT (A) NEG         Radiologic Studies -   XR CHEST PORT   Final Result   No acute process. CTA CODE NEURO HEAD AND NECK W CONT   Final Result   CTA Head:   1. No evidence of flow-limiting stenosis or aneurysm. Multifocal moderate   stenoses in the left P2 segment. CTA Neck:   1. No evidence of significant stenosis. CT CODE NEURO PERF W CBF   Final Result   Area of perfusion mismatch in left anterior frontal lobe. CT CODE NEURO HEAD WO CONTRAST   Final Result      Chronic white matter disease and areas of remote infarction. No acute process by   CT. Consider MRI for clinical concern for acute infarction. CT Results  (Last 48 hours)                 08/23/22 1204  CTA CODE NEURO HEAD AND NECK W CONT Final result    Impression:  CTA Head:   1. No evidence of flow-limiting stenosis or aneurysm. Multifocal moderate   stenoses in the left P2 segment. CTA Neck:   1. No evidence of significant stenosis. Narrative:  EXAM:  CTA CODE NEURO HEAD AND NECK W CONT       INDICATION:   Code Stroke       COMPARISON:  CT head 8/23/2022. CONTRAST:  100 mL of Isovue-370. TECHNIQUE:  Unenhanced  images were obtained to localize the volume for   acquisition.   Multislice helical axial CT angiography was performed from the   aortic arch to the top of the head during uneventful rapid bolus intravenous   contrast administration. Coronal and sagittal reformations and 3D post   processing was performed. CT dose reduction was achieved through use of a   standardized protocol tailored for this examination and automatic exposure   control for dose modulation. This study was analyzed by the [address]  algorithm. FINDINGS:       CTA Head:   There is no evidence of large vessel occlusion or flow-limiting stenosis of the   intracranial internal carotid, anterior cerebral, and middle cerebral arteries. Calcification of the bilateral carotid siphons without significant stenosis. The   anterior communicating artery is not well seen. There is no evidence of large vessel occlusion or flow-limiting stenosis of the   intracranial vertebral arteries, basilar artery, or posterior cerebral arteries. Multifocal moderate stenoses in the left P2 segment. Mild calcification of the   bilateral V4 segments without significant stenosis. The posterior communicating   arteries are diminutive but patent. There is no evidence of aneurysm or vascular malformation. The dural venous   sinuses and deep cerebral venous system are patent. No evidence of abnormal   enhancement on delayed phase images. CTA NECK:   NASCET method was utilized for calculating stenosis. The aortic arch is unremarkable. The common carotid arteries demonstrate no   significant stenosis. Mild calcific atherosclerosis of the proximal right   internal carotid artery without significant stenosis. Mild calcific   atherosclerosis of the proximal left internal carotid artery without significant   stenosis. There is a codominant vertebrobasilar arterial system. The left vertebral artery   arises directly from the aortic arch.  The cervical vertebral arteries are normal   in course, size and contour without significant stenosis. Visualized soft tissues of the neck are unremarkable. Visualized lung apices are   clear. No acute fracture or aggressive osseous lesion. Flowing ventral   osteophytes in the cervical spine, consistent with DISH. Scattered dental caries   and periapical lucencies. 08/23/22 1204  CT CODE NEURO PERF W CBF Final result    Impression:  Area of perfusion mismatch in left anterior frontal lobe. Narrative:  INDICATION: Code Neuro       COMPARISON: Earlier CT       EXAM: CT brain perfusion was performed with generation of hemodynamic maps of   multiple parameters, including cerebral blood flow, cerebral blood volume, and   MTT (mean transit time). CT dose reduction was achieved through use of a   standardized protocol tailored for this examination and automatic exposure   control for dose modulation. This study was analyzed by the [address] ai algorithm. CT   dose reduction was achieved through use of a standardized protocol tailored for   this examination and automatic exposure control for dose modulation. IV   contrast: 100 mL Isovue-370. FINDINGS: There is no asymmetry in the appearance of cerebral blood flow or   volume. There is a perfusion mismatch in the anterior left frontal lobe with   estimated volume of 5 cc.           08/23/22 1120  CT CODE NEURO HEAD WO CONTRAST Final result    Impression:      Chronic white matter disease and areas of remote infarction. No acute process by   CT. Consider MRI for clinical concern for acute infarction. Narrative:  INDICATION: Code Stroke       EXAM:  HEAD CT WITHOUT CONTRAST       COMPARISON: None       TECHNIQUE:  Routine noncontrast axial head CT was performed. Sagittal and   coronal reconstructions were generated. CT dose reduction was achieved through use of a standardized protocol tailored   for this examination and automatic exposure control for dose modulation.        FINDINGS:       Ventricles: Midline, no hydrocephalus. Intracranial Hemorrhage: None. Brain Parenchyma/Brainstem: Multiple areas of chronic infarction bilateral   corona radiata and left cerebellum. Patchy white matter hypodensities in the   supratentorial brain. Basal Cisterns: Normal.   Paranasal Sinuses: Visualized sinuses are clear. Additional Comments: N/A. CXR Results  (Last 48 hours)                 08/23/22 1346  XR CHEST PORT Final result    Impression:  No acute process. Narrative:  INDICATION: Chest Pain       EXAM:  AP CHEST RADIOGRAPH       COMPARISON: None       FINDINGS:       AP portable view of the chest demonstrates a normal cardiomediastinal   silhouette. There is no edema, effusion, consolidation, or pneumothorax. The   osseous structures are unremarkable. Medical Decision Making   I am the first provider for this patient. I reviewed the vital signs, available nursing notes, past medical history, past surgical history, family history and social history. Vital Signs-Reviewed the patient's vital signs. Patient Vitals for the past 12 hrs:   Temp Pulse Resp BP SpO2   08/23/22 1318 -- (!) 102 16 (!) 213/115 96 %   08/23/22 1230 -- 97 18 (!) 197/133 98 %   08/23/22 1218 -- 99 14 (!) 181/113 97 %   08/23/22 1212 97.7 °F (36.5 °C) 98 14 (!) 198/111 97 %       Records Reviewed: Nursing Notes and Old Medical Records    Provider Notes (Medical Decision Making): Activated as acute Level One stroke given wake-up acute speech deficit with unilateral weakness initially not known to be old    Differential does include acute ischemic stroke, hypertensive encephalopathy, ICH, underlying metabolic infectious encephalopathy. ED Course:   Initial assessment performed. The patients presenting problems have been discussed, and they are in agreement with the care plan formulated and outlined with them. I have encouraged them to ask questions as they arise throughout their visit.     ED Course as of 08/23/22 1441   Tue Aug 23, 2022   1117 Patient aphasic right-sided facial weakness, left-sided weakness to arm and leg. Eyes are midline no obvious visual field defect [WB]   1117 Last seen well at 7 PM last night however found abnormal at 10 AM, given wake-up stroke with severe deficit, did activate as a level 1 stroke [WB]   1118 Hypertensive on arrival to 399 systolic BGL initially 30 however improved spontaneously to 100 on serial rechecks, doubt hypoglycemia as etiology of his symptoms. [WB]   1145 CT shows no acute process, chronic white matter disease, remote infarction including 2 left cerebellum, corona radiata bilaterally [WB]   1224 Acidosis pH is 7.30 lactate 1.58 creatinine is 3.7 on VBG [WB]   1226 Appears encephalopathic to neurologist, CT shows old right sided stroke. HTN encephalopathy is a possibility. New stroke is possible, MRI brain recommended in addition to full medical workup [WB]   1252 CMP is only positive for creatinine 3.22 with mildly elevated BUN. LFTs are normal, electrolytes otherwise normal [WB]   1252 CBC is shows mildly low white count 3.7 hemoglobin 9.7 unknown baseline, normal differential [WB]   1252 UA is pending, UDS ethyl alcohol level is pending. [WB]   1252 VBG PCO2 is very minimally elevated at 54 I doubt this is CO2 narcosis [WB]   1313 I spoke with son, corroborates story. Wife,  from  currently. Recent dementia?, recent prostate cancer. Yesterday NP came to see patient, noted BP to be elevated.  This AM, found the patient to be acutely encephalopathic, staring, not talking. [WB]   1318 Unknown if patient has kidney disease [WB]   1318 Patient is without complaint at this time [WB]   1338 EKG shows sinus tachycardia with 1st degree AV block, rate 101, normal axis, otherwise normal intervals, no STEMI [WB]   1346 Chest x-ray reviewed demonstrates no acute cardiopulmonary process [WB]   1407 No obvious UTI [WB]   1407 Hospitalist paged for admission [WB]      ED Course User Index  [WB] Eleni Smiley MD     CRITICAL CARE NOTE :    2:41 PM    IMPENDING DETERIORATION -CNS  ASSOCIATED RISK FACTORS - CNS Decompensation  MANAGEMENT- Bedside Assessment and Supervision of Care  INTERPRETATION -  Blood Pressure  INTERVENTIONS - Contemplation of TNK  CASE REVIEW - Hospitalist/Intensivist, Medical Sub-Specialist, Nursing, and Family  TREATMENT RESPONSE -Stable  PERFORMED BY - Self    NOTES   :  I have spent 45 minutes of critical care time involved in lab review, consultations with specialist, family decision- making, bedside attention and documentation. This time excludes time spent in any separate billed procedures. During this entire length of time I was immediately available to the patient . Conner Thomas MD    Disposition:  Admission Note:  Patient is being admitted to the hospital by Dr. Analilia Servin, Service: Hospitalist.  The results of their tests and reasons for their admission have been discussed with them and available family. They convey agreement and understanding for the need to be admitted and for their admission diagnosis. Diagnosis     Clinical Impression:   1. Acute encephalopathy    2. Acute cystitis with hematuria        Attestations:    Conner Thomas M.D. Please note that this dictation was completed with [employer], the computer voice recognition software. Quite often unanticipated grammatical, syntax, homophones, and other interpretive errors are inadvertently transcribed by the computer software. Please disregard these errors. Please excuse any errors that have escaped final proofreading. Thank you.

## 2022-08-23 NOTE — PROGRESS NOTES
Bedside shift change report given to Sherice Lloyd RN (oncoming nurse) by TIGRE Alejandro RN (offgoing nurse).   Report included the following information SBAR, Kardex, MAR, and Recent Results

## 2022-08-23 NOTE — ED NOTES
Megan Young RE: Akbar Bhagat RM: 10 Good afternoon. I wanted to relay a change in neuro status that I noticed. I went in to evaluate pt and he would no longer answer any questions which is a change. His eyes seem to periodically roll back and his left leg has more of a drift. He is being transferred to the neuro unit. Need Callback: Martell Hanna served Dr. Melonie Cockayne    His response:    Yeah hes a cva and seizure work up.  Thanks

## 2022-08-23 NOTE — ED NOTES
Pt presents to the ED via E[facility] with left sided, weakness and aphasia. Pt was found down around 10 am this am by family.

## 2022-08-23 NOTE — H&P
Hospitalist Admission Note    NAME: Fabienne Bangura   :  1950   MRN:  [id number]     Date/Time:  2022 3:15 PM    Patient PCP: None  ______________________________________________________________________  Given the patient's current clinical presentation, I have a high level of concern for decompensation if discharged from the emergency department. Complex decision making was performed, which includes reviewing the patient's available past medical records, laboratory results, and x-ray films. My assessment of this patient's clinical condition and my plan of care is as follows. Assessment / Plan:    Acute encephalopathy to rule out acute CVA and seizure disorder, POA  Hypertensive urgency, POA  History of CVA with no residual weakness as per family, POA  - Presented with acute change in mentation started early this a.m.  - His baseline better than from sciatica as reported by family but otherwise he is alert oriented x4 and able to move 4 extremities  - Was evaluated by telemetry neurologist in the ED who recommended full CVA work-up  - No seizure was witnessed  - No previous history of seizures  - Not on any medications as per patient family  - Currently is alert, following simple commands, having right facial droop  - Blood pressure more than 190 on admission unknown if this is a true hypertensive urgency versus permissive hypertension from acute CVA  - Obtain MRI, echo, lipid panel, hemoglobin A1c, carotid ultrasound  - Start aspirin and Lipitor.   Patient's son at bedside said he does not have any allergies from before  - Start Keppra 1000 mg IV twice daily  - Seizure precautions  - Telemetry  - PT OT when able  - Neurochecks  - As needed blood pressure meds as per CVA protocol  - N.p.o. with sips with meds currently till he is more cooperative  - CTA head and neck with no significant stenosis  - CT head with chronic white matter disease and remote area of infarction, no acute CVA reported. Acute kidney injury, POA  - Creatinine around 3 on admission  - No previous baseline  - Start IV fluids  - Obtain renal ultrasound  - Follow BMP daily    Acute urinary tract infection, POA  - UA with bacteriuria  - Start ceftriaxone        Code Status: Full code  Surrogate Decision Maker: Patient's wife as per patient's son    DVT Prophylaxis: Heparin  GI Prophylaxis: not indicated    Baseline: Has been bedridden for almost 2 years from sciatica otherwise alert oriented x4 as per patient's son at bedside      Subjective:   CHIEF COMPLAINT: Change in mentation    HISTORY OF PRESENT ILLNESS:       The history was obtained from patient's son at bedside, questions were answered. The patient 71years old man with past medical history significant for CVA with no residual weakness, sciatica for which he has been bedridden for at least 2 years. Patient's son reported that the patient has not been taking any medications for a long time. He also reported that he had a stroke years ago with no residual weakness. Patient does not smoke, drink alcohol or use illicit drugs. Patient recently moved from [place] and he does not have a PCP. We were asked to admit for work up and evaluation of the above problems. No past medical history on file. No past surgical history on file. Social History     Tobacco Use    Smoking status: Not on file    Smokeless tobacco: Not on file   Substance Use Topics    Alcohol use: Not on file        No family history on file. Not on File     Prior to Admission medications    Not on File       REVIEW OF SYSTEMS:     I am not able to complete the review of systems because:    The patient is intubated and sedated   x The patient has altered mental status due to his acute medical problems    The patient has baseline aphasia from prior stroke(s)    The patient has baseline dementia and is not reliable historian    The patient is in acute medical distress and unable to provide information           Total of 12 systems reviewed as follows:       POSITIVE= underlined text  Negative = text not underlined  General:  fever, chills, sweats, generalized weakness, weight loss/gain,      loss of appetite   Eyes:    blurred vision, eye pain, loss of vision, double vision  ENT:    rhinorrhea, pharyngitis   Respiratory:   cough, sputum production, SOB, HO, wheezing, pleuritic pain   Cardiology:   chest pain, palpitations, orthopnea, PND, edema, syncope   Gastrointestinal:  abdominal pain , N/V, diarrhea, dysphagia, constipation, bleeding   Genitourinary:  frequency, urgency, dysuria, hematuria, incontinence   Muskuloskeletal :  arthralgia, myalgia, back pain  Hematology:  easy bruising, nose or gum bleeding, lymphadenopathy   Dermatological: rash, ulceration, pruritis, color change / jaundice  Endocrine:   hot flashes or polydipsia   Neurological:  headache, dizziness, confusion, focal weakness, paresthesia,     Speech difficulties, memory loss, gait difficulty  Psychological: Feelings of anxiety, depression, agitation    Objective:   VITALS:    Visit Vitals  BP (!) 213/115   Pulse (!) 102   Temp 97.7 °F (36.5 °C)   Resp 16   SpO2 96%       PHYSICAL EXAM:    General:    Alert, not cooperative, no distress, appears stated age. HEENT: Atraumatic, anicteric sclerae, pink conjunctivae     No oral ulcers, mucosa moist, throat clear, dentition fair  Neck:  Supple, symmetrical,  thyroid: non tender  Lungs:   Clear to auscultation bilaterally. No Wheezing or Rhonchi. No rales. Chest wall:  No tenderness  No Accessory muscle use. Heart:   Regular  rhythm,  No  murmur   No edema  Abdomen:   Soft, non-tender. Not distended. Bowel sounds normal  Extremities: No cyanosis. No clubbing,      Skin turgor normal, Capillary refill normal, Radial dial pulse 2+  Skin:     Not pale. Not Jaundiced  No rashes   Psych:  Poor insight. Not depressed. Not anxious or agitated. Neurologic: EOMs intact. Right facial droop, moving 4 extremities, pupils are reactive bilaterally, following only simple commands but not cooperative overall.    _______________________________________________________________________  Care Plan discussed with:    Comments   Patient x    Family  x Patent's son at bedside    RN x    Care Manager                    Consultant:      _______________________________________________________________________  Expected  Disposition:   Home with Family    HH/PT/OT/RN    SNF/LTC x   [facility]LE[facility]    ________________________________________________________________________  TOTAL TIME:  68 Minutes    Critical Care Provided     Minutes non procedure based      Comments    x Reviewed previous records   >50% of visit spent in counseling and coordination of care x Discussion with patient and/or family and questions answered       ________________________________________________________________________  Signed: Aki Villanueva MD    Procedures: see electronic medical records for all procedures/Xrays and details which were not copied into this note but were reviewed prior to creation of Plan.     LAB DATA REVIEWED:    Recent Results (from the past 24 hour(s))   GLUCOSE, POC    Collection Time: 08/23/22 11:31 AM   Result Value Ref Range    Glucose (POC) 103 65 - 117 mg/dL    Performed by Alfonso Hinds \"[facility]alia\" EDT    CBC WITH AUTOMATED DIFF    Collection Time: 08/23/22 11:53 AM   Result Value Ref Range    WBC 3.7 (L) 4.1 - 11.1 K/uL    RBC 3.08 (L) 4.10 - 5.70 M/uL    HGB 9.7 (L) 12.1 - 17.0 g/dL    HCT 29.4 (L) 36.6 - 50.3 %    MCV 95.5 80.0 - 99.0 FL    MCH 31.5 26.0 - 34.0 PG    MCHC 33.0 30.0 - 36.5 g/dL    RDW 12.3 11.5 - 14.5 %    PLATELET 875 317 - 674 K/uL    MPV 9.6 8.9 - 12.9 FL    NRBC 0.0 0  WBC    ABSOLUTE NRBC 0.00 0.00 - 0.01 K/uL    NEUTROPHILS 65 32 - 75 %    LYMPHOCYTES 26 12 - 49 %    MONOCYTES 5 5 - 13 %    EOSINOPHILS 4 0 - 7 %    BASOPHILS 0 0 - 1 %    IMMATURE GRANULOCYTES 0 0.0 - 0.5 % ABS. NEUTROPHILS 2.4 1.8 - 8.0 K/UL    ABS. LYMPHOCYTES 1.0 0.8 - 3.5 K/UL    ABS. MONOCYTES 0.2 0.0 - 1.0 K/UL    ABS. EOSINOPHILS 0.1 0.0 - 0.4 K/UL    ABS. BASOPHILS 0.0 0.0 - 0.1 K/UL    ABS. IMM. GRANS. 0.0 0.00 - 0.04 K/UL    DF AUTOMATED     METABOLIC PANEL, COMPREHENSIVE    Collection Time: 08/23/22 11:53 AM   Result Value Ref Range    Sodium 144 136 - 145 mmol/L    Potassium 4.0 3.5 - 5.1 mmol/L    Chloride 111 (H) 97 - 108 mmol/L    CO2 28 21 - 32 mmol/L    Anion gap 5 5 - 15 mmol/L    Glucose 104 (H) 65 - 100 mg/dL    BUN 33 (H) 6 - 20 MG/DL    Creatinine 3.22 (H) 0.70 - 1.30 MG/DL    BUN/Creatinine ratio 10 (L) 12 - 20      GFR est AA 23 (L) >60 ml/min/1.73m2    GFR est non-AA 19 (L) >60 ml/min/1.73m2    Calcium 9.1 8.5 - 10.1 MG/DL    Bilirubin, total 0.7 0.2 - 1.0 MG/DL    ALT (SGPT) 20 12 - 78 U/L    AST (SGOT) 14 (L) 15 - 37 U/L    Alk.  phosphatase 114 45 - 117 U/L    Protein, total 6.9 6.4 - 8.2 g/dL    Albumin 3.6 3.5 - 5.0 g/dL    Globulin 3.3 2.0 - 4.0 g/dL    A-G Ratio 1.1 1.1 - 2.2     PROTHROMBIN TIME + INR    Collection Time: 08/23/22 11:53 AM   Result Value Ref Range    INR 1.0 0.9 - 1.1      Prothrombin time 10.8 9.0 - 11.1 sec   BLOOD GAS,CHEM8,LACTIC ACID POC    Collection Time: 08/23/22 11:57 AM   Result Value Ref Range    Calcium, ionized (POC) 1.24 1.12 - 1.32 mmol/L    BICARBONATE 27 mmol/L    Base deficit (POC) 0.3 mmol/L    Sample source VENOUS BLOOD      CO2, POC 27 (H) 19 - 24 MMOL/L    Sodium,  (H) 136 - 145 MMOL/L    Potassium, POC 3.9 3.5 - 5.5 MMOL/L    Chloride,  (H) 100 - 108 MMOL/L    Glucose, POC 98 74 - 106 MG/DL    Creatinine, POC 3.7 (H) 0.6 - 1.3 MG/DL    Lactic Acid (POC) 1.58 0.40 - 2.00 mmol/L    pH, venous (POC) 7.30 (L) 7.32 - 7.42      pCO2, venous (POC) 54.6 (H) 41 - 51 MMHG    pO2, venous (POC) 21 (L) 25 - 40 mmHg   EKG, 12 LEAD, INITIAL    Collection Time: 08/23/22  1:10 PM   Result Value Ref Range    Ventricular Rate 101 BPM    Atrial Rate 101 BPM    P-R Interval 210 ms    QRS Duration 82 ms    Q-T Interval 322 ms    QTC Calculation (Bezet) 417 ms    Calculated P Axis 76 degrees    Calculated R Axis 41 degrees    Calculated T Axis -32 degrees    Diagnosis       Sinus tachycardia with 1st degree AV block  Nonspecific ST and T wave abnormality  No previous ECGs available     URINALYSIS W/ REFLEX CULTURE    Collection Time: 08/23/22  1:42 PM    Specimen: Urine   Result Value Ref Range    Color YELLOW/STRAW      Appearance CLEAR CLEAR      Specific gravity 1.029      pH (UA) 5.5 5.0 - 8.0      Protein 100 (A) NEG mg/dL    Glucose Negative NEG mg/dL    Ketone Negative NEG mg/dL    Bilirubin Negative NEG      Blood SMALL (A) NEG      Urobilinogen 0.2 0.2 - 1.0 EU/dL    Nitrites Negative NEG      Leukocyte Esterase Negative NEG      WBC 5-10 0 - 4 /hpf    RBC 5-10 0 - 5 /hpf    Epithelial cells MODERATE (A) FEW /lpf    Bacteria 1+ (A) NEG /hpf    UA:UC IF INDICATED CULTURE NOT INDICATED BY UA RESULT CNI      Yeast PRESENT (A) NEG     ETHYL ALCOHOL    Collection Time: 08/23/22  1:52 PM   Result Value Ref Range    ALCOHOL(ETHYL),SERUM <10 <10 MG/DL

## 2022-08-24 NOTE — PROGRESS NOTES
NEED FAMILY/CONTACT INFORMATION      Transition of Care Plan:    RUR:  14%  Disposition:  TBD - possibly IP Rehab  Follow up appointments:  needs new PCP  DME needed:  TBD  Transportation at Discharge:  TBD  Keys or means to access home:    TBD    IM Medicare Letter:  to be given closer to dc  Is patient a  and connected with the [place]? TBD           If yes, was [employer] transfer form completed and [facility] notified? Caregiver Contact:  TBD - have no family names or contact info  Discharge Caregiver contacted prior to discharge? Care Conference needed?:        Reason for Admission:  CVA; encephalopathy, VINICIO     Son reported that pt was living in [place], was not taking care of himself so family brought him back to [place]. Reported baseline is bedbound due to sciatica. Will need to confirm. RUR Score:    14%                 Plan for utilizing home health:     TBD     PCP: First and Last name:  None  Pt has recently moved back to [place] from [place] and reportedly does not have any physicians. Name of Practice:    Are you a current patient: Yes/No:    Approximate date of last visit:    Can you participate in a virtual visit with your PCP:                     Current Advanced Directive/Advance Care Plan: Full Code      Healthcare Decision Maker:   Click here to complete [address] including selection of the Healthcare Decision Maker Relationship (ie \"Primary\")    Pt's son was in ED but does not appear that we have his name or contact info. Pt is reportedly  from his wife but wife is apparently somewhat involved in pt's care. Transition of Care Plan:      Pt w/ L thalamic stroke - aphasic. SLP following for dysphagia- will need extensive Speech rehab and/or possible PEG depending on goals of care. VINICIO and UTI improving. Discharge Plan may include IP Rehab depending on pt's progress and goals of care.     Need family names and contact information when they visit next. Will need to determine who is surrogate medical decision maker since pt and wife are  (but wife is still involved).    Palliative consult may be beneficial.        Care Management Interventions  PCP Verified by CM: No  Palliative Care Criteria Met (RRAT>21 & CHF Dx)?: Yes  Palliative Consult Recommended?: Yes  Mode of Transport at Discharge: S  Transition of Care Consult (CM Consult): Discharge Planning  Discharge Durable Medical Equipment: No  Physical Therapy Consult: Yes  Occupational Therapy Consult: Yes  Speech Therapy Consult: Yes  Support Systems: Spouse/Significant Other, Child(kasandra)  Discharge Location  Patient [facility]

## 2022-08-24 NOTE — PROGRESS NOTES
Problem: Dysphagia (Adult)  Goal: *Acute Goals and Plan of Care (Insert Text)  Description: 8/24/2022  Speech path goals  1. Patient will participate with reeval of swallowing   Outcome: Not Met     Problem: Communication Impaired (Adult)  Goal: *Acute Goals and Plan of Care (Insert Text)  Description: 8/24/2022  Speech path   1. Patient will follow one step commands with 80% acc with mod cues   2. Patient will answer personal y/n questions with 80% acc with mod cues  3. Patient will say /ah/ with max cues with 50% acc.   8/24/2022 1143 by Aldo Balderrama SLP  Outcome: Not Met  SPEECH LANGUAGE PATHOLOGY BEDSIDE SWALLOW AND SPEECH EVALUATIONS  Patient: Fabienne Bangura (76 y.o. male)  Date: 8/24/2022  Primary Diagnosis: CVA (cerebral vascular accident) [facility]) [I63.9]  Acute encephalopathy [G93.40]  VINICIO (acute kidney injury) ([facility] [address] [N17.9]  Hypertensive urgency [I16.0]       Precautions: aspiration  Fall, Bed Alarm    ASSESSMENT :  Based on the objective data described below, the patient presents with severe nonfluent aphasia. He is following a few commands and answering a few y/n questions with head nods/shaking. He is nonverbal/nonvocal. Did not imitate /ah/.   He has severe oropharyngeal dysphagia. He accepted an ice chip but minimal oral manipulation but swallow was absent. He had a very delayed cough several times. No further po offered due to severe dysphagia. R facial weakness noted. Educated the family about aphasia and to not put any demands on him to speak. Just recommended comforting him by holding his hand. Prognosis seems guarded at this time for swallowing and communication. Stroke was small L thalamic. Patient will benefit from skilled intervention to address the above impairments. Patients rehabilitation potential is considered to be Guarded     PLAN :  Recommendations and Planned Interventions: Will need intensive speech therapy while hospitalized and at discharge.    Frequency/Duration: Patient will be followed by speech-language pathology 3 times a week to address goals. Discharge Recommendations: Inpatient Rehab and Skilled Nursing Facility     SUBJECTIVE:   Patient was nonverbal.     OBJECTIVE:     Past Medical History:   Diagnosis Date    History of stroke     Prostate CA [facility])    History reviewed. No pertinent surgical history. Prior Level of Function/Home Situation: communication was wnl. Swallowing was wnl  Home Situation  Home Environment: Private residence  # Steps to Enter: 3  One/Two Story Residence: Two story  # of Interior Steps: 8  Interior Rails: Both  Living Alone: No  Support Systems: Child(kasandra)  Patient Expects to be Discharged to[de-identified] Skilled nursing facility  Current DME Used/Available at Home: None  Diet prior to admission: reg/thins  Current Diet:  NP   Cognitive and Communication Status:  Neurologic State: Alert  Orientation Level: Oriented to person  Cognition: Decreased command following  Perception: Cues to maintain midline in sitting, Cues to attend to left side of body  Perseveration: No perseveration noted  Safety/Judgement: Decreased awareness of environment, Decreased awareness of need for assistance, Decreased awareness of need for safety, Lack of insight into deficits  Swallowing Evaluation:   Oral Assessment:  Oral Assessment  Labial: Right droop (mod)  Dentition: Upper dentures; Natural  Lingual: Other (comment)  Velum: Unable to visualize  Mandible: No impairment  P.O.  Trials:  Patient Position: upright in bed  Vocal quality prior to P.O.: Other (comment)  Consistency Presented: Ice chips  How Presented: SLP-fed/presented;Spoon     Bolus Acceptance: No impairment  Bolus Formation/Control: Impaired     Propulsion: Absent     Initiation of Swallow: Absent     Aspiration Signs/Symptoms: Weak cough (very delayed)                Oral Phase Severity: Moderate  Pharyngeal Phase Severity : Severe    NOMS:   The NOMS functional outcome measure was used to quantify this patient's level of swallowing impairment. Based on the NOMS, the patient was determined to be at level 1 for swallow function       NOMS Swallowing Levels:  Level 1 (CN): NPO  Level 2 (CM): NPO but takes consistency in therapy  Level 3 (CL): Takes less than 50% of nutrition p.o. and continues with nonoral feedings; and/or safe with mod cues; and/or max diet restriction  Level 4 (CK): Safe swallow but needs mod cues; and/or mod diet restriction; and/or still requires some nonoral feeding/supplements  Level 5 (CJ): Safe swallow with min diet restriction; and/or needs min cues  Level 6 (CI): Independent with p.o.; rare cues; usually self cues; may need to avoid some foods or needs extra time  Level 7 ([address] [place] [address]): Independent for all p.o.  CRYS. (2003). National Outcomes Measurement System (NOMS): Adult Speech-Language Pathology User's Guide. Speech/Language Evaluation  Motor Speech:  Oral-Motor Structure/Motor Speech  Face: Lower facial weakness  Labial: Right droop (mod)  Dentition: Upper dentures; Natural  Lingual: Other (comment)  Velum: Unable to visualize  Mandible: No impairment  Apraxic Characteristics: None  Dysarthric Characteristics: None  Overall Impairment Severity: None  Language Comprehension and Expression:  Auditory Comprehension  Auditory Impairment: Yes  Response to Basic Yes/No Questions (%): 30 %  One-Step Basic Commands (%): 50 %  Verbal Expression  Primary Mode of Expression: Nonverbal - Gestures  Initiation: Impaired (%)  Conversation:  (nonverbal)           Voice:                 Vocal Quality: Other (comment)                                 NOMS: verbal expression 1    Pain:             After treatment:   Patient left in no apparent distress in bed    COMMUNICATION/EDUCATION:   Educated the family about his aphasia and dysphagia. He is not to take anything by mouth. He has severe comprehension and expression deficits. Some commands followed and a few questions answered with head nods/shaking.  The family will need further education. They wanted to know about his prognosis and did he have a stroke or not. Referred them to the neurologist.     The patient's plan of care including recommendations, planned interventions, and recommended diet changes were discussed with: Registered nurse. Patient is unable to participate in goal setting and plan of care.     Thank you for this referral.  Tresa Casanova, SLP  Time Calculation: 25 mins

## 2022-08-24 NOTE — PROGRESS NOTES
Problem: Self Care Deficits Care Plan (Adult)  Goal: *Acute Goals and Plan of Care (Insert Text)  Description: FUNCTIONAL STATUS PRIOR TO ADMISSION: Unsure patient's PLOF. Per chart review, patient was found walking around in [place] prior to ex-wife bringing him to [address] [place] [address]. HOME SUPPORT: Patient unable to verbalize. Occupational Therapy Goals  Initiated 8/24/2022  1. Patient will perform simple grooming task sitting EOB with minimal assistance within 7 day(s). 2.  Patient will perform upper body dressing with minimal assistance within 7 day(s). 3.  Patient will perform lower body dressing with maximal assistance within 7 day(s). 4.  Patient will perform toilet transfers to [facility] with maximal assistance within 7 day(s). 5.  Patient will perform all aspects of toileting with maximal assistance within 7 day(s). 6.  Patient will participate in upper extremity therapeutic exercise/activities with moderate assistance for 3 minutes within 7 day(s). Outcome: Not Met   OCCUPATIONAL THERAPY EVALUATION  Patient: Sofya Richter (89 y.o. male)  Date: 8/24/2022  Primary Diagnosis: CVA (cerebral vascular accident) [facility]) [I63.9]  Acute encephalopathy [G93.40]  VINICIO (acute kidney injury) ([facility] [N17.9]  Hypertensive urgency [I16.0]       Precautions: Fall, Bed Alarm    ASSESSMENT  Based on the objective data described below, the patient presents with decreased independence in self-care and functional mobility secondary to global weakness, impaired sitting balance, grossly impaired coordination in BUE, decreased command following, poor attention/safety awareness, and poor activity tolerance. Patient unable to verbalize to provide PLOF but appears to be functioning significantly below his baseline for self-care and functional mobility. Overall, patient now required max to total assist for self-care and max to max assist x2 for bed mobility. Patient received semisupine in bed and cleared for therapy by nursing.  Patient unable to state name when asked and attempted to nod yes/no to questions but responses were inconsistent. Patient required max assist x2 to complete supine > sit and demonstrated poor sitting balance with LOB towards R. Patient unable to correct and required mod to max assist to return to midline. Patient tolerated sitting EOB for approx 3 mins before returning to supine. Patient unable to participate in fugl kay assessment to address BUE secondary to decreased command following but did demonstrate impaired coordination, ROM, and strength in BUE. Patient was left semisupine in bed with all needs met, VSS, and bed alarmed. Patient would benefit from skilled OT services during acute hospital stay. Anticipate patient would benefit from rehab at discharge, pending progress. Current Level of Function Impacting Discharge (ADLs/self-care): max to total assist for ADLs, max to max assist x2 for functional mobility     Functional Outcome Measure: The patient scored 0/100 on the Barthel Index outcome measure which is indicative of being totally dependent in ADLs. Other factors to consider for discharge: fall risk, confused      Patient will benefit from skilled therapy intervention to address the above noted impairments. PLAN :  Recommendations and Planned Interventions: self care training, functional mobility training, therapeutic exercise, balance training, visual/perceptual training, therapeutic activities, cognitive retraining, endurance activities, neuromuscular re-education, patient education, home safety training, and family training/education    Frequency/Duration: Patient will be followed by occupational therapy 3 times a week to address goals.     Recommendation for discharge: (in order for the patient to meet his/her long term goals)  rehab    This discharge recommendation:  Has been made in collaboration with the attending provider and/or case management    IF patient discharges home will need the following DME: TBD in rehab       SUBJECTIVE:   Patient nodded \"yes\" when asked if tired    OBJECTIVE DATA SUMMARY:   HISTORY:   Past Medical History:   Diagnosis Date    History of stroke     Prostate CA ([facility] [address]    History reviewed. No pertinent surgical history. Expanded or extensive additional review of patient history:     Home Situation  Home Environment: Private residence  # Steps to Enter: 3  One/Two Story Residence: Two story  # of Interior Steps: 8  Interior Rails: Both  Living Alone: No  Support Systems: Child(kasandra)  Patient Expects to be Discharged to[de-identified] Skilled nursing facility  Current DME Used/Available at Home: None    EXAMINATION OF PERFORMANCE DEFICITS:  Cognitive/Behavioral Status:  Neurologic State: Drowsy  Orientation Level: Unable to verbalize  Cognition: Decreased command following; Impaired decision making;Poor safety awareness  Perception: Cues to maintain midline in sitting;Cues to attend to left side of body  Perseveration: No perseveration noted  Safety/Judgement: Decreased awareness of environment;Decreased awareness of need for assistance;Decreased awareness of need for safety; Lack of insight into deficits    Hearing:   Auditory  Auditory Impairment: Hard of hearing, bilateral    Vision/Perceptual:    Acuity:  (unable to formally assess)      Range of Motion:  AROM: Generally decreased, functional  PROM: Generally decreased, functional    Strength:  Strength: Generally decreased, functional    Coordination:  Coordination: Generally decreased, functional  Fine Motor Skills-Upper: Left Impaired;Right Impaired    Gross Motor Skills-Upper: Left Impaired;Right Impaired    Balance:  Sitting: Impaired  Sitting - Static: Poor (constant support)  Sitting - Dynamic: Poor (constant support)    Functional Mobility and Transfers for ADLs:  Bed Mobility:  Rolling: Maximum assistance  Supine to Sit: Maximum assistance;Assist x2  Sit to Supine: Maximum assistance;Assist x2  Scooting: Maximum assistance;Assist x2    ADL Assessment:  Feeding:  (npo)  Oral Facial Hygiene/Grooming: Maximum assistance  Bathing: Total assistance  Upper Body Dressing: Total assistance  Lower Body Dressing: Total assistance  Toileting: Total assistance    ADL Intervention and task modifications:    Lower Body Dressing Assistance  Socks: Total assistance (dependent)  Leg Crossed Method Used: No  Position Performed: Seated edge of bed  Cues: Don;Physical assistance; Tactile cues provided;Verbal cues provided    Cognitive Retraining  Safety/Judgement: Decreased awareness of environment;Decreased awareness of need for assistance;Decreased awareness of need for safety; Lack of insight into deficits    Functional Measure:    Barthel Index:  Bathin  Bladder: 0  Bowels: 0  Groomin  Dressin  Feedin  Mobility: 0  Stairs: 0  Toilet Use: 0  Transfer (Bed to Chair and Back): 0  Total: 0/100      The Barthel ADL Index: Guidelines  1. The index should be used as a record of what a patient does, not as a record of what a patient could do. 2. The main aim is to establish degree of independence from any help, physical or verbal, however minor and for whatever reason. 3. The need for supervision renders the patient not independent. 4. A patient's performance should be established using the best available evidence. Asking the patient, friends/relatives and nurses are the usual sources, but direct observation and common sense are also important. However direct testing is not needed. 5. Usually the patient's performance over the preceding 24-48 hours is important, but occasionally longer periods will be relevant. 6. Middle categories imply that the patient supplies over 50 per cent of the effort. 7. Use of aids to be independent is allowed.     Score Interpretation (from [address] [place] [address])    Independent   60-79 Minimally independent   40-59 Partially dependent   20-39 Very dependent   <20 Totally dependent     -Shelli Benavides, BarthelFORREST. (1965). Functional evaluation: the Barthel Index. [facility] ([address] (1997). The Barthel activities of daily living index: self-reporting versus actual performance in the old (> or = 75 years). Journal of [address] [place] [address] 45(7), [facility], CARIN, Cosme Vincent., Paolo De Luna (1999). Measuring the change in disability after inpatient rehabilitation; comparison of the responsiveness of the Barthel Index and Functional [place] Measure. Journal of Neurology, Neurosurgery, and Psychiatry, 66(4), 967-818. Rafia Viera, NIsauroJ.A, GLORY Nina, & Marcel Colon MTORRES. (2004) Assessment of post-stroke quality of life in cost-effectiveness studies: The usefulness of the Barthel Index and the EuroQoL-5D. Quality of Life Research, 13, 877-59      Based on the above components, the patient evaluation is determined to be of the following complexity level: MEDIUM  Pain Rating:  Patient did not c/o pain during session. Activity Tolerance:   Poor    After treatment patient left in no apparent distress:    Supine in bed, Call bell within reach, Bed / chair alarm activated, and Side rails x 3    COMMUNICATION/EDUCATION:   The patients plan of care was discussed with: Physical therapist and Registered nurse. Patient is unable to participate in goal setting and plan of care. This patients plan of care is appropriate for delegation to hospitals.     Thank you for this referral.  ISAIAS Arora  Time Calculation: 15 mins

## 2022-08-24 NOTE — PROGRESS NOTES
Hospitalist Progress Note    NAME: Norma Martin   :  1950   MRN:  [id number]       Assessment / Plan:  Acute encephalopathy POA  2/2 acute CVA  HTN emergency    History of CVA with no residual weakness as per family, POA    -MRI brain showed small acute infarction ventral left thalamus and chronic changes  -CT negative for LVO  -EEG pending  -Continue aspirin, Plavix, statin  -Currently on Keppra, follow neurology recommendation  -PT/ST/OT pending  -Still has significant aphasia, speech therapy following, plan to assess again tomorrow, if no significant improvement, will need to discuss about goals of care/PEG    -Add amlodipine today    Acute kidney injury, POA, imrproving  - Creatinine around 3 on admission  - No previous baseline  - Start IV fluids  - Obtain renal ultrasound  - Follow BMP daily     Acute urinary tract infection, POA  - UA with bacteriuria  - Start ceftriaxone           Code Status: Full code  Surrogate Decision Maker: Patient's wife as per patient's son     DVT Prophylaxis: Heparin  GI Prophylaxis: not indicated     Baseline: Has been bedridden for almost 2 years from sciatica otherwise alert oriented x4 as per patient's son at bedside         Subjective:     Chief Complaint / Reason for Physician Visit  Poor speech , able to tell his name only. Discussed with RN events overnight. Review of Systems:  Symptom Y/N Comments  Symptom Y/N Comments   Fever/Chills    Chest Pain     Poor Appetite    Edema     Cough    Abdominal Pain     Sputum    Joint Pain     SOB/HO    Pruritis/Rash     Nausea/vomit    Tolerating PT/OT     Diarrhea    Tolerating Diet     Constipation    Other       Could NOT obtain due to:      Objective:     VITALS:   Last 24hrs VS reviewed since prior progress note.  Most recent are:  Patient Vitals for the past 24 hrs:   Temp Pulse Resp BP SpO2   22 1152 98.7 °F (37.1 °C) 93 18 (!) 203/107 97 %   22 0912 -- -- -- (!) 178/82 --   22 0751 98.3 °F (36.8 °C) 82 18 (!) 194/103 100 %   08/24/22 0314 98.6 °F (37 °C) 80 18 (!) 130/102 100 %   08/23/22 2314 98.9 °F (37.2 °C) 88 19 (!) 144/104 100 %   08/23/22 1920 98.3 °F (36.8 °C) 99 20 (!) 160/102 98 %   08/23/22 1728 98.7 °F (37.1 °C) 97 20 (!) 156/113 100 %   08/23/22 1534 98 °F (36.7 °C) 100 20 (!) 201/110 98 %   08/23/22 1318 -- (!) 102 16 (!) 213/115 96 %       Intake/Output Summary (Last 24 hours) at 8/24/2022 1233  Last data filed at 8/24/2022 8327  Gross per 24 hour   Intake --   Output 900 ml   Net -900 ml        I had a face to face encounter and independently examined this patient on 8/24/2022, as outlined below:  PHYSICAL EXAM:  General: WD, WN. Alert, cooperative, no acute distress    EENT:  EOMI. Anicteric sclerae. MMM  Resp:  CTA bilaterally, no wheezing or rales. No accessory muscle use  CV:  Regular  rhythm,  No edema  GI:  Soft, Non distended, Non tender. +Bowel sounds  Neurologic:  Alert and oriented X self. Follows simple commands. Psych:   Good insight. Not anxious nor agitated  Skin:  No rashes. No jaundice    Reviewed most current lab test results and cultures  YES  Reviewed most current radiology test results   YES  Review and summation of old records today    NO  Reviewed patient's current orders and MAR    YES  PMH/SH reviewed - no change compared to H&P  ________________________________________________________________________  Care Plan discussed with:    Comments   Patient x    Family      RN     Care Manager     Consultant                        Multidiciplinary team rounds were held today with , nursing, pharmacist and clinical coordinator. Patient's plan of care was discussed; medications were reviewed and discharge planning was addressed.      ________________________________________________________________________  Total NON critical care TIME:  34   Minutes    Total CRITICAL CARE TIME Spent:   Minutes non procedure based      Comments   >50% of visit spent in counseling and coordination of care     ________________________________________________________________________  Marco Kimball MD     Procedures: see electronic medical records for all procedures/Xrays and details which were not copied into this note but were reviewed prior to creation of Plan. LABS:  I reviewed today's most current labs and imaging studies.   Pertinent labs include:  Recent Labs     08/24/22  0032 08/23/22  1153   WBC 4.7 3.7*   HGB 9.3* 9.7*   HCT 28.3* 29.4*    156     Recent Labs     08/24/22  0032 08/23/22  1153    144   K 3.8 4.0   * 111*   CO2 27 28   * 104*   BUN 30* 33*   CREA 3.04* 3.22*   CA 9.0 9.1   ALB 3.4* 3.6   TBILI 0.7 0.7   ALT 18 20   INR  --  1.0       Signed: Marco Kimball MD

## 2022-08-24 NOTE — PROGRESS NOTES
CARE MANAGEMENT NOTE      Pt admitted on 8/23/23 for acute encephalopathy. CM reviewed chart to complete initial assessment. Per documentation, Pt is not A/O to complete initial assessment. CM reviewed chart- no NOK on file. CM will continue to follow.    _____________________________________  [facility]at [facility]   Available via [address] [place] [address]  8/24/2022   12:15 PM

## 2022-08-24 NOTE — PROGRESS NOTES
Problem: Mobility Impaired (Adult and Pediatric)  Goal: *Acute Goals and Plan of Care (Insert Text)  Description:   FUNCTIONAL STATUS PRIOR TO ADMISSION: Patient was independent and active without use of DME. Pt was wandering around [place] until the ex wife brought him to [place]. HOME SUPPORT PRIOR TO ADMISSION: Pt was homeless, but now with ex wife? ??    Physical Therapy Goals  Initiated 8/24/2022  1. Patient will move from supine to sit and sit to supine  in bed with moderate assistance  within 7 day(s). 2.  Patient will transfer from bed to chair and chair to bed with maximal assistance using the least restrictive device within 7 day(s). 3.  Patient will perform sit to stand with maximal assistance within 7 day(s). 4.  Patient will ambulate with maximal assistance for 5 feet with the least restrictive device within 7 day(s). 5.  Patient will improve Huitron Balance score by 7 points within 7 days. Outcome: Not Met     PHYSICAL THERAPY EVALUATION- NEURO POPULATION  Patient: Sandi Avila (5 y.o. male)  Date: 8/24/2022  Primary Diagnosis: CVA (cerebral vascular accident) ([facility] [address] [I63.9]  Acute encephalopathy [G93.40]  VINICIO (acute kidney injury) ([facility] [address] [N17.9]  Hypertensive urgency [I16.0]       Precautions: fall         ASSESSMENT  Based on the objective data described below, the patient presents with generalized weakness, decreased activity tolerance, poor balance and overall decreased functional mobility. Pt was received in supine and cleared by nursing to mobilize. Pt lethargic, but able to follow some commands. Noted inconsistent ahead nods to answer questions. Came to the EOB and noted poor sitting balance with increased leaning to the R. Tolerated sitting EOB 3 minutes and was returned to supine due to increased fatigue. Current Level of Function Impacting Discharge (mobility/balance): total    Functional Outcome Measure:  The patient scored Total: 1/56 on the [address] which is indicative of high fall risk. Other factors to consider for discharge:      Patient will benefit from skilled therapy intervention to address the above noted impairments. PLAN :  Recommendations and Planned Interventions: bed mobility training, transfer training, gait training, therapeutic exercises, neuromuscular re-education, patient and family training/education, and therapeutic activities      Frequency/Duration: Patient will be followed by physical therapy:  3 times a week to address goals. Recommendation for discharge: (in order for the patient to meet his/her long term goals)  Rehab    This discharge recommendation:  Has not yet been discussed the attending provider and/or case management    IF patient discharges home will need the following DME: to be determined (TBD)         SUBJECTIVE:   Patient aphasic    OBJECTIVE DATA SUMMARY:   HISTORY:    Past Medical History:   Diagnosis Date    History of stroke     Prostate CA ([facility] [address]    History reviewed. No pertinent surgical history. Personal factors and/or comorbidities impacting plan of care:     Home Situation  Home Environment: Private residence  # Steps to Enter: 3  One/Two Story Residence: Two story  # of Interior Steps: 8  Interior Rails: Both  Living Alone: No  Support Systems: Child(kasandra)  Patient Expects to be Discharged to[de-identified] Rehab hospital/unit acute  Current DME Used/Available at Home: None    EXAMINATION/PRESENTATION/DECISION MAKING:   Critical Behavior:  Neurologic State: Alert  Orientation Level: Unable to verbalize  Cognition: Follows commands     Hearing:   Auditory  Auditory Impairment: None  Skin:  intact  Edema: none  Range Of Motion:  AROM: Generally decreased, functional           PROM: Generally decreased, functional           Strength:    Strength: Generally decreased, functional                                   Coordination:  Coordination: Generally decreased, functional  Vision:      Functional Mobility:  Bed Mobility:  Rolling: Maximum assistance  Supine to Sit: Maximum assistance;Assist x2  Sit to Supine: Maximum assistance;Assist x2  Scooting: Maximum assistance;Assist x2                       Balance:   Sitting: Impaired  Sitting - Static: Poor (constant support)  Sitting - Dynamic: Poor (constant support)    Functional Measure  [place] Balance Test:    Sitting to Standin  Standing Unsupported: 0  Sitting with Back Unsupported: 1  Standing to Sittin  Transfers: 0  Standing Unsupported with Eyes Closed: 0  Standing Unsupported with Feet Together: 0  Reach Forward with Outstretched Arm: 0   Object: 0  Turn to Look Over Shoulders: 0  Turn 360 Degrees: 0  Alternate Foot on Step/Stool: 0  Standing Unsupported One Foot in Front: 0  Stand on One Le  Total:          56=Maximum possible score;   0-20=High fall risk  21-40=Moderate fall risk   41-56=Low fall risk        Physical Therapy Evaluation Charge Determination   History Examination Presentation Decision-Making   HIGH Complexity :3+ comorbidities / personal factors will impact the outcome/ POC  HIGH Complexity : 4+ Standardized tests and measures addressing body structure, function, activity limitation and / or participation in recreation  MEDIUM Complexity : Evolving with changing characteristics  Other outcome measures CHAMORRO  HIGH       Based on the above components, the patient evaluation is determined to be of the following complexity level: MEDIUM        Activity Tolerance:   Fair      After treatment patient left in no apparent distress:   Supine in bed and Call bell within reach    COMMUNICATION/EDUCATION:   The patients plan of care was discussed with: Occupational therapist and Registered nurse. Pt not appropriate for education      Patient is unable to participate in goal setting and plan of care.     Thank you for this referral.  Cherelle Allen, PT, DPT   Time Calculation: 14 mins

## 2022-08-24 NOTE — PROGRESS NOTES
End of Shift Note    Bedside shift change report given to Kim Olivo, RN (oncoming nurse) by Mayo Bonilla RN (offgoing nurse). Report included the following information SBAR, Kardex, and MAR    Shift worked:  1900-7a   Shift summary and any significant changes:    Labs drawn   Pt still needs Echo, Carotid, PT/OT/ SLP and EEG  Pt unable to speak but will nod head appropriately   Md paged about HTN- no new orders- said day MD to decide    Concerns for physician to address:  None    Zone phone for oncoming shift:  [phone]     Patient Information  Jasbir Rodriguez  71 y.o.  8/23/2022 11:14 AM by Marj Perez MD. Jasbir Rodriguez was admitted from Home    Problem List  Patient Active Problem List    Diagnosis Date Noted    CVA (cerebral vascular accident) ([address] 08/23/2022    VINICIO (acute kidney injury) ([address] 08/23/2022    Hypertensive urgency 08/23/2022    Acute encephalopathy 08/23/2022    Bilateral carotid artery stenosis 08/23/2022    Thrombotic stroke involving left middle cerebral artery ([address] 08/23/2022    Aphasia due to acute cerebrovascular accident (CVA) ([address] 08/23/2022    Acute alteration in mental status 08/23/2022    Convulsions ([address] 08/23/2022    History of stroke 08/23/2022    Prostate cancer ([address] 08/23/2022     Past Medical History:   Diagnosis Date    History of stroke     Prostate CA ([address]        Core Measures:  CVA: Yes   CHF:  PNA:    Activity:  Activity Level: Bed Rest  Number times ambulated in hallways past shift: 0  Number of times OOB to chair past shift: 0    Cardiac:   Cardiac Monitoring: Yes      Cardiac Rhythm: Sinus Rhythm    Access:   Current line(s): PIV   Central Line? No   PICC LINE? No     Genitourinary:   Urinary status: Voiding, External Catheter   Urinary Catheter?  No  Respiratory:   O2 Device: None (Room air)  Chronic home O2 use?: NO  Incentive spirometer at bedside: NO       GI:  Last Bowel Movement Date:  (PTA)  Current diet:  DIET NPO Sips of Water with Meds  Passing flatus: YES  Tolerating current diet: NO       Pain Management:   Patient states pain is manageable on current regimen: N/A    Skin:  Shilo Score: 16  Interventions: PT/OT consult and internal/external urinary devices    Patient Safety:  Fall Score:  Total Score: 3  Interventions: bed/chair alarm, gripper socks, and pt to call before getting OOB  High Fall Risk: Yes  @Rollbelt  @dexterity to release roll belt  Yes/No ( must document dexterity  here by stating Yes or No here, otherwise this is a restraint and must follow restraint documentation policy.)    DVT prophylaxis:  DVT prophylaxis Med- Yes  DVT prophylaxis SCD or CORRINE- No     Wounds: (If Applicable)  Wounds- No  Location       Active Consults:  IP CONSULT TO HOSPITALIST  IP CONSULT TO NEUROLOGY    Length of Stay:  Expected LOS: - - -  Actual LOS: 1  Discharge Plan: Yes       Jayro Pereyra RN

## 2022-08-24 NOTE — PROGRESS NOTES
End of Shift Note     Bedside shift change report given to Kilpatrick RN (oncoming nurse) by Aretha Farley RN (offgoing nurse). Report included the following information SBAR, Kardex, and MAR     Shift worked: 7a-7p   Shift summary and any significant changes:     No significant changes   Concerns for physician to address:  None    Zone phone for oncoming shift:  [phone]      Patient Information  Evans Blizzard  71 y.o.  8/23/2022 11:14 AM by Myles Diaz MD. Evans Blizzard was admitted from Home     Problem List       Patient Active Problem List     Diagnosis Date Noted    CVA (cerebral vascular accident) ([address] 08/23/2022    VINICIO (acute kidney injury) ([address] 08/23/2022    Hypertensive urgency 08/23/2022    Acute encephalopathy 08/23/2022    Bilateral carotid artery stenosis 08/23/2022    Thrombotic stroke involving left middle cerebral artery ([address] 08/23/2022    Aphasia due to acute cerebrovascular accident (CVA) ([address] 08/23/2022    Acute alteration in mental status 08/23/2022    Convulsions ([address] 08/23/2022    History of stroke 08/23/2022    Prostate cancer ([address] 08/23/2022           Past Medical History:   Diagnosis Date    History of stroke      Prostate CA ([address]           Core Measures:  CVA: Yes   CHF:  PNA:     Activity:  Activity Level: Bed Rest  Number times ambulated in hallways past shift: 0  Number of times OOB to chair past shift: 0     Cardiac:   Cardiac Monitoring: Yes      Cardiac Rhythm: Sinus Rhythm     Access:   Current line(s): PIV   Central Line? No   PICC LINE? No      Genitourinary:   Urinary status: Voiding, External Catheter   Urinary Catheter?  No  Respiratory:   O2 Device: None (Room air)  Chronic home O2 use?: NO  Incentive spirometer at bedside: NO     GI:  Last Bowel Movement Date:  (PTA)  Current diet:  DIET NPO Sips of Water with Meds  Passing flatus: YES  Tolerating current diet: NO     Pain Management:   Patient states pain is manageable on current regimen: N/A     Skin:  Shilo Score: 16  Interventions: PT/OT consult and internal/external urinary devices    Patient Safety:  Fall Score: Total Score: 3  Interventions: bed/chair alarm, gripper socks, and pt to call before getting OOB  High Fall Risk: Yes     DVT prophylaxis:  DVT prophylaxis Med- Yes  DVT prophylaxis SCD or CORRINE- No      Wounds: (If Applicable)  Wounds- No  Location         Active Consults:  IP CONSULT TO HOSPITALIST  IP CONSULT TO NEUROLOGY     Length of Stay:  Expected LOS: - - -  Actual LOS: 1  Discharge Plan:  Yes

## 2022-08-24 NOTE — PROGRESS NOTES
Problem: Pressure Injury - Risk of  Goal: *Prevention of pressure injury  Description: Document Shilo Scale and appropriate interventions in the flowsheet. 8/24/2022 1559 by Harika Liang RN  Outcome: Progressing Towards Goal  Note: Pressure Injury Interventions: Activity Interventions: Increase time out of bed, Pressure redistribution bed/mattress(bed type)    Mobility Interventions: HOB 30 degrees or less, Pressure redistribution bed/mattress (bed type)    Nutrition Interventions: Document food/fluid/supplement intake                  8/24/2022 1558 by Harika Liang RN  Outcome: Progressing Towards Goal  Note: Pressure Injury Interventions: Activity Interventions: Increase time out of bed, Pressure redistribution bed/mattress(bed type)    Mobility Interventions: HOB 30 degrees or less, Pressure redistribution bed/mattress (bed type)    Nutrition Interventions: Document food/fluid/supplement intake                  8/24/2022 1558 by Harika Liang RN  Outcome: Progressing Towards Goal  Note: Pressure Injury Interventions: Activity Interventions: Increase time out of bed, Pressure redistribution bed/mattress(bed type)    Mobility Interventions: HOB 30 degrees or less, Pressure redistribution bed/mattress (bed type)    Nutrition Interventions: Document food/fluid/supplement intake                     Problem: Falls - Risk of  Goal: *Absence of Falls  Description: Document Gian Fall Risk and appropriate interventions in the flowsheet.   8/24/2022 1559 by Harika Liang RN  Outcome: Progressing Towards Goal  Note: Fall Risk Interventions:       Mentation Interventions: Adequate sleep, hydration, pain control, Bed/chair exit alarm         Elimination Interventions: Bed/chair exit alarm, Call light in reach, Stay With Me (per policy), Urinal in reach    History of Falls Interventions: Bed/chair exit alarm, Door open when patient unattended, Room close to nurse's station      8/24/2022 1558 by Monika Tilley RN  Outcome: Progressing Towards Goal  Note: Fall Risk Interventions:       Mentation Interventions: Adequate sleep, hydration, pain control, Bed/chair exit alarm         Elimination Interventions: Bed/chair exit alarm, Call light in reach, Stay With Me (per policy), Urinal in reach    History of Falls Interventions: Bed/chair exit alarm, Door open when patient unattended, Room close to nurse's station      8/24/2022 1558 by Monika Tilley RN  Outcome: Progressing Towards Goal  Note: Fall Risk Interventions:       Mentation Interventions: Adequate sleep, hydration, pain control, Bed/chair exit alarm         Elimination Interventions: Bed/chair exit alarm, Call light in reach, Stay With Me (per policy), Urinal in reach    History of Falls Interventions: Bed/chair exit alarm, Door open when patient unattended, Room close to nurse's station         Problem: TIA/CVA Stroke: Day 2 Until Discharge  Goal: Activity/Safety  Outcome: Progressing Towards Goal  Goal: Diagnostic Test/Procedures  Outcome: Progressing Towards Goal  Goal: Nutrition/Diet  Outcome: Progressing Towards Goal  Goal: Discharge Planning  Outcome: Progressing Towards Goal  Goal: Medications  Outcome: Progressing Towards Goal  Goal: Respiratory  Outcome: Progressing Towards Goal  Goal: Treatments/Interventions/Procedures  Outcome: Progressing Towards Goal  Goal: Psychosocial  Outcome: Progressing Towards Goal  Goal: *Verbalizes anxiety and depression are reduced or absent  Outcome: Not Progressing Towards Goal  Goal: *Optimal pain control at patient's stated goal  Outcome: Progressing Towards Goal  Goal: *Tolerating diet  Outcome: Progressing Towards Goal  Goal: *Ability to perform ADLs and demonstrates progressive mobility and function  Outcome: Progressing Towards Goal  Goal: *Stroke education continued(Stroke Metric)  Outcome: Progressing Towards Goal

## 2022-08-25 NOTE — CONSULTS
Jason Sharif         NAME:Pj Baez  JPW:[id number]   :1950     Patient seen  Vinicio  Hypernatremia  Ckd- unknown baseline  Confusion    Ivf  Iv bp meds  Serology work up      CVA (cerebral vascular accident) ([facility] [I63.9]  Acute encephalopathy [G93.40]  VINICIO (acute kidney injury) ([address] [N17.9]  Hypertensive urgency [I16.0]     Stephanie Arvizu MD  [facility]  [facility]  [facility] [address] [place], [address] [place]  Phone - [phone]         Fax - [phone] [facility] Office  [address] [place] [address]  Phone - [phone]        Fax - [phone]

## 2022-08-25 NOTE — PROGRESS NOTES
Hospitalist Progress Note    NAME: Peter Blood   :  1950   MRN:  [id number]       Assessment / Plan:  Acute encephalopathy POA  2/2 acute CVA  HTN emergency    History of CVA with no residual weakness as per family, POA    -MRI brain showed small acute infarction ventral left thalamus and chronic changes  -CT negative for LVO  -EEG negative for seizure  -Continue aspirin, Plavix, statin    AMS worsening, CT head neg for changes/bleed. Was started on Keppra, EEG was negative, patient more lethargic today, possibly from [address] as he also has significant kidney injury. We will hold Keppra for now, and see if it makes any improvement    -Blood pressure improved since admission, continue amlodipine. Add metoprolol IV scheduled with holding parameters    Acute kidney injury, POA,   Hypernatremia  - Creatinine around 3 on admission  - No previous baseline  - Renal ultrasound without hydronephrosis  -Creatinine worse today, have nephrology evaluation  -D5 half-normal saline     Acute urinary tract infection, POA  - UA with bacteriuria  - Start ceftriaxone           Code Status: Full code  Surrogate Decision Maker: Patient's wife as per patient's son     DVT Prophylaxis: Heparin  GI Prophylaxis: not indicated     Baseline: Has been bedridden for almost 2 years from sciatica otherwise alert oriented x4 as per patient's son at bedside         Subjective:     Chief Complaint / Reason for Physician Visit  Patient is more altered today. Review of Systems:  Symptom Y/N Comments  Symptom Y/N Comments   Fever/Chills    Chest Pain     Poor Appetite    Edema     Cough    Abdominal Pain     Sputum    Joint Pain     SOB/HO    Pruritis/Rash     Nausea/vomit    Tolerating PT/OT     Diarrhea    Tolerating Diet     Constipation    Other       Could NOT obtain due to:      Objective:     VITALS:   Last 24hrs VS reviewed since prior progress note.  Most recent are:  Patient Vitals for the past 24 hrs:   Temp Pulse Resp BP SpO2   08/25/22 1246 98.2 °F (36.8 °C) (!) 104 24 (!) 171/91 (!) 89 %   08/25/22 1200 -- (!) 105 -- -- --   08/25/22 0845 100.3 °F (37.9 °C) (!) 107 19 (!) 152/81 92 %   08/25/22 0800 -- (!) 102 -- -- --   08/25/22 0323 97.7 °F (36.5 °C) 78 18 (!) 164/88 99 %   08/24/22 2319 97.9 °F (36.6 °C) 90 18 (!) 174/98 98 %   08/24/22 2043 98.1 °F (36.7 °C) 99 18 (!) 162/100 98 %   08/24/22 1618 98.9 °F (37.2 °C) 96 18 (!) 189/104 97 %   08/24/22 1339 -- -- -- (!) 184/78 --       No intake or output data in the 24 hours ending 08/25/22 1335       I had a face to face encounter and independently examined this patient on 8/25/2022, as outlined below:  PHYSICAL EXAM:  General: WD, WN. Alert, cooperative, no acute distress    EENT:  EOMI. Anicteric sclerae. MMM  Resp:  CTA bilaterally, no wheezing or rales. No accessory muscle use  CV:  Regular  rhythm,  No edema  GI:  Soft, Non distended, Non tender. +Bowel sounds  Neurologic:  Drowsy. Psych:   Unable to assess  Skin:  No rashes. No jaundice    Reviewed most current lab test results and cultures  YES  Reviewed most current radiology test results   YES  Review and summation of old records today    NO  Reviewed patient's current orders and MAR    YES  PMH/SH reviewed - no change compared to H&P  ________________________________________________________________________  Care Plan discussed with:    Comments   Patient x    Family      RN     Care Manager     Consultant                        Multidiciplinary team rounds were held today with , nursing, pharmacist and clinical coordinator. Patient's plan of care was discussed; medications were reviewed and discharge planning was addressed.      ________________________________________________________________________  Total NON critical care TIME:  34   Minutes    Total CRITICAL CARE TIME Spent:   Minutes non procedure based      Comments   >50% of visit spent in counseling and coordination of care ________________________________________________________________________  Latonya Savage MD     Procedures: see electronic medical records for all procedures/Xrays and details which were not copied into this note but were reviewed prior to creation of Plan. LABS:  I reviewed today's most current labs and imaging studies.   Pertinent labs include:  Recent Labs     08/24/22  0032 08/23/22  1153   WBC 4.7 3.7*   HGB 9.3* 9.7*   HCT 28.3* 29.4*    156       Recent Labs     08/25/22  1034 08/24/22  0032 08/23/22  1153   * 144 144   K 3.9 3.8 4.0   * 110* 111*   CO2 23 27 28   * 105* 104*   BUN 35* 30* 33*   CREA 3.28* 3.04* 3.22*   CA 9.0 9.0 9.1   ALB  --  3.4* 3.6   TBILI  --  0.7 0.7   ALT  --  18 20   INR  --   --  1.0         Signed: Latonya Savage MD

## 2022-08-25 NOTE — PROGRESS NOTES
Comprehensive Nutrition Assessment    Type and Reason for Visit: Initial, Positive nutrition screen    Nutrition Recommendations/Plan:   Diet advancement per SLP     Nutrition Assessment:    Patient medically noted for CVA, acute encephalopathy, VINICIO, and HTN. PMH CVA. Patient NPO at this time; SLP following but patient too lethargic for evaluation today. RN in with patient providing care at time of attempted visit. No weight history noted on chart review; unsure of PO intake PTA. Will monitor ability to advance to PO diet vs need for nutrition support. Malnutrition Assessment:  Malnutrition Status:  Insufficient data (08/25/22 1315)      Nutrition Related Findings:    Na 146, -962-  Norvasc, Atorvastatin, Plavix    Current Nutrition Intake & Therapies:        DIET NPO Sips of Water with Meds    Anthropometric Measures:  Height: 6' (182.9 cm)  Ideal Body Weight (IBW): 178 lbs (81 kg)     Current Body Wt:  73 kg (160 lb 15 oz), 90.4 % IBW. Current BMI (kg/m2): 21.8                          BMI Category: Normal weight (BMI 18.5-24. 9)    Estimated Daily Nutrient Needs:  Energy Requirements Based On: Formula  Weight Used for Energy Requirements: Current  Energy (kcal/day): 1980 kcals (BMR 1523 x 1. 3AF)  Weight Used for Protein Requirements: Current  Protein (g/day): 73g (1.0 g/kg bw)  Method Used for Fluid Requirements: 1 ml/kcal  Fluid (ml/day): 2000 mL    Nutrition Diagnosis:   Inadequate protein-energy intake related to cognitive or neurological impairment, swallowing difficulty as evidenced by NPO or clear liquid status due to medical condition    Nutrition Interventions:   Food and/or Nutrient Delivery: Start oral diet  Nutrition Education/Counseling: No recommendations at this time  Coordination of Nutrition Care: Continue to monitor while inpatient       Goals:     Goals: Initiate PO diet, by next RD assessment       Nutrition Monitoring and Evaluation:   Behavioral-Environmental Outcomes: None identified  Food/Nutrient Intake Outcomes: Diet advancement/tolerance  Physical Signs/Symptoms Outcomes: Biochemical data, Weight, Chewing or swallowing    Discharge Planning:     Too soon to determine    Tobias Herrera RD  Contact: ext [phone]

## 2022-08-25 NOTE — PROGRESS NOTES
Occupational Therapy note:    Patient going JOYCE for additional testing. Will defer and continue to follow.     Marshall Mays, OTR/L

## 2022-08-25 NOTE — PROGRESS NOTES
Consult  REFERRED BY:  None    CHIEF COMPLAINT: Sudden loss of speech on awakening this morning      Subjective:     Mildred Menon is a 71 y.o. right-handed -American male, seen as a new patient, at the request of the hospitalist, for evaluation of new problem of sudden loss of speech that was present when he awoke this morning, he is not able to say any words since then. The ex-wife has not noticed any new focal weakness or sensory loss, they have been able to walk and move about okay. He did have a previous stroke about 22 years ago, but did not have any significant residual weakness after that. She apparently picked him up about 9 months ago in [place] where he was found on the streets wandering, living by himself but not taking care of himself, and she brought him [facility] has not seen a physician since then because he refuses to go and is taking no medication. He has been started on aspirin therapy and high-dose statin for now. He had a CTA of the head neck that was negative except for some left posterior cerebral artery stenosis, and a CT of the head that was unremarkable except for old microvascular disease. His Dopplers and MRI are pending. He has been no complaints of chest pain or any other head trauma, fever, meningismus, or other precipitating causes for his event. He does not appear to have made any improvement as it is going on. His MRI shows acute left thalamic infarct which probably accounts for his aphasia, and his EEG does not show any seizures, just slowing generalized, worse in the left temporal area, and his carotid Dopplers do not show any surgical disease. We will continue current therapy and see how he does, he is still not talking much at all and more drowsy today. Past Medical History:   Diagnosis Date    History of stroke     Prostate [facility])       History reviewed. No pertinent surgical history. History reviewed. No pertinent family history.    Social History     Tobacco Use    Smoking status: Never    Smokeless tobacco: Never   Substance Use Topics    Alcohol use: Not on file         Current Facility-Administered Medications:     amLODIPine (NORVASC) tablet 10 mg, 10 mg, Oral, DAILY, Buffy DIAZ MD, 10 mg at 08/24/22 1050    clopidogreL (PLAVIX) tablet 75 mg, 75 mg, Oral, DAILY, Mackenzie White MD, 75 mg at 08/24/22 1050    labetaloL (NORMODYNE;TRANDATE) injection 10 mg, 10 mg, IntraVENous, Q2H PRN, Marisol Corley MD, 10 mg at 08/24/22 1657    acetaminophen (TYLENOL) tablet 650 mg, 650 mg, Oral, Q4H PRN **OR** acetaminophen (TYLENOL) solution 650 mg, 650 mg, Per NG tube, Q4H PRN **OR** acetaminophen (TYLENOL) suppository 650 mg, 650 mg, Rectal, Q4H PRN, Deepak Menard MD    aspirin chewable tablet 81 mg, 81 mg, Oral, DAILY, Deepak Menard MD, 81 mg at 08/24/22 1050    atorvastatin (LIPITOR) tablet 40 mg, 40 mg, Oral, QHS, Deepak Menard MD    heparin (porcine) injection 5,000 Units, 5,000 Units, SubCUTAneous, Q8H, Corrine Rondon MD, 5,000 Units at 08/24/22 1336    cefTRIAXone (ROCEPHIN) 1 g in 0.9% sodium chloride (MBP/ADV) 50 mL MBP, 1 g, IntraVENous, Q24H, Corrine Rondon MD, Last Rate: 100 mL/hr at 08/24/22 1843, 1 g at 08/24/22 1843    levETIRAcetam (KEPPRA) injection 1,000 mg, 1,000 mg, IntraVENous, Q12H, Corrine Rondon MD, 1,000 mg at 08/24/22 1055        No Known Allergies   MRI Results (most recent):  Results from Hospital Encounter encounter on 08/23/22    MRI BRAIN WO CONT    Narrative  INDICATION:   CVA work-up    EXAMINATION:  MRI BRAIN WO CONTRAST    COMPARISON:  CTA earlier today    TECHNIQUE:  Multiplanar multisequence acquisition without contrast of the brain. FINDINGS:    Ventricles:  Midline, no hydrocephalus. Brain Parenchyma/Brainstem:  Chronic infarction left cerebellum. Moderate  chronic microvascular ischemic disease in the supratentorial brain with small  areas of chronic bilateral corona radiata infarction.  Small acute infarction  ventral left thalamus. Intracranial Hemorrhage:  None. Basal Cisterns:  Normal.  Flow Voids:  Normal.  Additional Comments:  Diminished T1 marrow signal cervical spine. Impression  1. Small acute infarction ventral left thalamus. 2. Chronic microvascular ischemic disease and areas of chronic infarction as  above. 3. Nonspecific marrow signal cervical spine. Results from Hospital Encounter encounter on 08/23/22    MRI BRAIN WO CONT    Narrative  INDICATION:   CVA work-up    EXAMINATION:  MRI BRAIN WO CONTRAST    COMPARISON:  CTA earlier today    TECHNIQUE:  Multiplanar multisequence acquisition without contrast of the brain. FINDINGS:    Ventricles:  Midline, no hydrocephalus. Brain Parenchyma/Brainstem:  Chronic infarction left cerebellum. Moderate  chronic microvascular ischemic disease in the supratentorial brain with small  areas of chronic bilateral corona radiata infarction. Small acute infarction  ventral left thalamus. Intracranial Hemorrhage:  None. Basal Cisterns:  Normal.  Flow Voids:  Normal.  Additional Comments:  Diminished T1 marrow signal cervical spine. Impression  1. Small acute infarction ventral left thalamus. 2. Chronic microvascular ischemic disease and areas of chronic infarction as  above. 3. Nonspecific marrow signal cervical spine. Review of Systems:  Review of systems not obtained due to patient factors. Vitals:    08/24/22 1152 08/24/22 1339 08/24/22 1618 08/24/22 2043   BP: (!) 203/107 (!) 184/78 (!) 189/104 (!) 162/100   Pulse: 93  96 99   Resp: 18  18 18   Temp: 98.7 °F (37.1 °C)  98.9 °F (37.2 °C) 98.1 °F (36.7 °C)   SpO2: 97%  97% 98%   Weight:   160 lb 15 oz (73 kg)    Height:   6' (1.829 m)      Objective:     I      NEUROLOGICAL EXAM:    Appearance:   The patient is well developed, well nourished, aphasic and provides no history   Mental Status: Aphasic and provides no history, may be able to get the word yes out once   Cranial Nerves: Unreliable visual fields. Fundi are benign, disc are flat, no lesions seen on funduscopy. CRISTOBAL, EOM's full, no nystagmus, no ptosis. Facial sensation is normal. Corneal reflexes are not tested. Facial movement is show central facial weakness on the right. Hearing is abnormal bilaterally. Palate is midline with normal sternocleidomastoid and trapezius muscles are normal. Tongue is midline. Neck without meningismus or bruits  Temporal arteries are not tender or enlarged  TMJ areas are not tender on palpation   Motor:  4/5 strength in upper and lower proximal and distal muscles. Normal bulk and tone. No fasciculations. Rapid alternating movement is symmetric and intact bilaterally   Reflexes:   Deep tendon reflexes 1+/4 and symmetrical.  No babinski or clonus present   Sensory:   Normal to touch, pinprick and vibration and temperature are unreliable. DSS is unreliable   Gait:  Not tested   Tremor:   No tremor noted. Cerebellar:  Not tested abnormal cerebellar signs present on Romberg and tandem testing and finger-nose-finger exam.   Neurovascular:  Normal heart sounds and regular rhythm, peripheral pulses decreased and no carotid bruits. Assessment:       ICD-10-CM ICD-9-CM    1. Acute encephalopathy  G93.40 348.30       2.  Acute cystitis with hematuria  N30.01 595.0         Active Problems:    CVA (cerebral vascular accident) ([address] (8/23/2022)      VINICIO (acute kidney injury) ([address] (8/23/2022)      Hypertensive urgency (8/23/2022)      Acute encephalopathy (8/23/2022)      Bilateral carotid artery stenosis (8/23/2022)      Thrombotic stroke involving left middle cerebral artery ([address] (8/23/2022)      Aphasia due to acute cerebrovascular accident (CVA) ([address] (8/23/2022)      Acute alteration in mental status (8/23/2022)      Convulsions ([address] (8/23/2022)      History of stroke (8/23/2022)      Prostate cancer ([address] (8/23/2022)      Plan:     Patient with sudden onset aphasia, most likely secondary to CVA, will await the MRI scan, continue aspirin therapy and high-dose statin for now, we will follow carefully with you, have discussed with the wife in detail, and he obviously needs to get a medical doctor here because of his recurrent stroke again. May need inpatient rehab. Need to rule out cardiac source so complete neurovascular work-up has been ordered. Will check Doppler and EEG also. I reviewed his CTA and CT personally, agree with reports as above, they are unremarkable and I discussed his condition with the patient and his wife and discussed treatment options and plans with her in detail also. His MRI shows acute left thalamic infarct which probably accounts for his aphasia, and his EEG does not show any seizures, just slowing generalized, worse in the left temporal area, and his carotid Dopplers do not show any surgical disease. We will continue current therapy and see how he does, he is still not talking much at all and more drowsy today.     Signed By: Rebeca Lugo MD     August 24, 2022       CC: None  FAX: None

## 2022-08-25 NOTE — PROGRESS NOTES
Problem: Pressure Injury - Risk of  Goal: *Prevention of pressure injury  Description: Document Shilo Scale and appropriate interventions in the flowsheet. Outcome: Progressing Towards Goal  Note: Pressure Injury Interventions: Activity Interventions: Increase time out of bed    Mobility Interventions: HOB 30 degrees or less    Nutrition Interventions: Document food/fluid/supplement intake                     Problem: Patient Education: Go to Patient Education Activity  Goal: Patient/Family Education  Outcome: Progressing Towards Goal     Problem: Falls - Risk of  Goal: *Absence of Falls  Description: Document Paola Flow Fall Risk and appropriate interventions in the flowsheet.   Outcome: Progressing Towards Goal  Note: Fall Risk Interventions:       Mentation Interventions: Adequate sleep, hydration, pain control         Elimination Interventions: Bed/chair exit alarm, Call light in reach    History of Falls Interventions: Bed/chair exit alarm         Problem: Patient Education: Go to Patient Education Activity  Goal: Patient/Family Education  Outcome: Progressing Towards Goal     Problem: Patient Education: Go to Patient Education Activity  Goal: Patient/Family Education  Outcome: Progressing Towards Goal     Problem: Patient Education: Go to Patient Education Activity  Goal: Patient/Family Education  Outcome: Progressing Towards Goal     Problem: Patient Education: Go to Patient Education Activity  Goal: Patient/Family Education  Outcome: Progressing Towards Goal     Problem: Patient Education: Go to Patient Education Activity  Goal: Patient/Family Education  Outcome: Progressing Towards Goal     Problem: Patient Education: Go to Patient Education Activity  Goal: Patient/Family Education  Outcome: Progressing Towards Goal     Problem: Patient Education: Go to Patient Education Activity  Goal: Patient/Family Education  Outcome: Progressing Towards Goal     Problem: TIA/CVA Stroke: 0-24 hours  Goal: Off Pathway (Use only if patient is Off Pathway)  Outcome: Progressing Towards Goal  Goal: Activity/Safety  Outcome: Progressing Towards Goal  Goal: Consults, if ordered  Outcome: Progressing Towards Goal  Goal: Diagnostic Test/Procedures  Outcome: Progressing Towards Goal  Goal: Nutrition/Diet  Outcome: Progressing Towards Goal  Goal: Discharge Planning  Outcome: Progressing Towards Goal  Goal: Medications  Outcome: Progressing Towards Goal  Goal: Respiratory  Outcome: Progressing Towards Goal  Goal: Treatments/Interventions/Procedures  Outcome: Progressing Towards Goal  Goal: Minimize risk of bleeding post-thrombolytic infusion  Outcome: Progressing Towards Goal  Goal: Monitor for complications post-thrombolytic infusion  Outcome: Progressing Towards Goal  Goal: Psychosocial  Outcome: Progressing Towards Goal  Goal: *Hemodynamically stable  Outcome: Progressing Towards Goal  Goal: *Neurologically stable  Description: Absence of additional neurological deficits    Outcome: Progressing Towards Goal  Goal: *Verbalizes anxiety and depression are reduced or absent  Outcome: Progressing Towards Goal  Goal: *Absence of Signs of Aspiration on Current Diet  Outcome: Progressing Towards Goal  Goal: *Absence of deep venous thrombosis signs and symptoms(Stroke Metric)  Outcome: Progressing Towards Goal  Goal: *Ability to perform ADLs and demonstrates progressive mobility and function  Outcome: Progressing Towards Goal  Goal: *Stroke education started(Stroke Metric)  Outcome: Progressing Towards Goal  Goal: *Dysphagia screen performed(Stroke Metric)  Outcome: Progressing Towards Goal  Goal: *Rehab consulted(Stroke Metric)  Outcome: Progressing Towards Goal     Problem: TIA/CVA Stroke: Day 2 Until Discharge  Goal: Off Pathway (Use only if patient is Off Pathway)  Outcome: Progressing Towards Goal  Goal: Activity/Safety  Outcome: Progressing Towards Goal  Goal: Diagnostic Test/Procedures  Outcome: Progressing Towards Goal  Goal: Nutrition/Diet  Outcome: Progressing Towards Goal  Goal: Discharge Planning  Outcome: Progressing Towards Goal  Goal: Medications  Outcome: Progressing Towards Goal  Goal: Respiratory  Outcome: Progressing Towards Goal  Goal: Treatments/Interventions/Procedures  Outcome: Progressing Towards Goal  Goal: Psychosocial  Outcome: Progressing Towards Goal  Goal: *Verbalizes anxiety and depression are reduced or absent  Outcome: Progressing Towards Goal  Goal: *Absence of aspiration  Outcome: Progressing Towards Goal  Goal: *Absence of deep venous thrombosis signs and symptoms(Stroke Metric)  Outcome: Progressing Towards Goal  Goal: *Optimal pain control at patient's stated goal  Outcome: Progressing Towards Goal  Goal: *Tolerating diet  Outcome: Progressing Towards Goal  Goal: *Ability to perform ADLs and demonstrates progressive mobility and function  Outcome: Progressing Towards Goal  Goal: *Stroke education continued(Stroke Metric)  Outcome: Progressing Towards Goal     Problem: Ischemic Stroke: Discharge Outcomes  Goal: *Verbalizes anxiety and depression are reduced or absent  Outcome: Progressing Towards Goal  Goal: *Verbalize understanding of risk factor modification(Stroke Metric)  Outcome: Progressing Towards Goal  Goal: *Hemodynamically stable  Outcome: Progressing Towards Goal  Goal: *Absence of aspiration pneumonia  Outcome: Progressing Towards Goal  Goal: *Aware of needed dietary changes  Outcome: Progressing Towards Goal  Goal: *Verbalize understanding of prescribed medications including anti-coagulants, anti-lipid, and/or anti-platelets(Stroke Metric)  Outcome: Progressing Towards Goal  Goal: *Tolerating diet  Outcome: Progressing Towards Goal  Goal: *Aware of follow-up diagnostics related to anticoagulants  Outcome: Progressing Towards Goal  Goal: *Ability to perform ADLs and demonstrates progressive mobility and function  Outcome: Progressing Towards Goal  Goal: *Absence of DVT(Stroke Metric)  Outcome: Progressing Towards Goal  Goal: *Absence of aspiration  Outcome: Progressing Towards Goal  Goal: *Optimal pain control at patient's stated goal  Outcome: Progressing Towards Goal  Goal: *Home safety concerns addressed  Outcome: Progressing Towards Goal  Goal: *Describes available resources and support systems  Outcome: Progressing Towards Goal  Goal: *Verbalizes understanding of activation of EMS(911) for stroke symptoms(Stroke Metric)  Outcome: Progressing Towards Goal  Goal: *Understands and describes signs and symptoms to report to providers(Stroke Metric)  Outcome: Progressing Towards Goal  Goal: *Neurolgocially stable (absence of additional neurological deficits)  Outcome: Progressing Towards Goal  Goal: *Verbalizes importance of follow-up with primary care physician(Stroke Metric)  Outcome: Progressing Towards Goal  Goal: *Smoking cessation discussed,if applicable(Stroke Metric)  Outcome: Progressing Towards Goal  Goal: *Depression screening completed(Stroke Metric)  Outcome: Progressing Towards Goal

## 2022-08-25 NOTE — PROCEDURES
[address]  EEG    Name:  Gail Draper  MR#:  [id number]  :  1950  ACCOUNT #:  [de-identified]  DATE OF SERVICE:  2022    CLINICAL INDICATION:  The patient is a 59-year-old male with sudden onset of loss of speech. EEG to rule out stroke, rule out seizures, rule out epilepsy, rule out possible nonepileptic status. EEG CLASSIFICATION:  On this patient is dysrhythmia grade II, generalized maximum left temporal area. DESCRIPTION OF THE RECORD:  The background of this recording contains a posteriorly located occipital alpha rhythm of 8-9 Hz that does attenuate with eye opening, and in addition there is a generalized increase in some 2 to 6 Hz theta activity seen throughout the recording and is somewhat more prominent in the left temporal region at times. There were no clear areas of spike or spike and wave discharges seen during the recording. No dysrhythmic or electrographic spells of any type seen. Photic stimulation produced little change in the background hyperventilation was not performed. During the study, the patient did enter states of sleep with K complexes and sleep spindles seen in the central head regions. INTERPRETATION:  This is a mildly abnormal electroencephalogram due to likely generalized slowing seen most consistent with a diffuse encephalopathy of toxic, metabolic or degenerative type. In addition, there appeared to be some more focal slowing at times seen in the left temporal area suggestive of some possible cortical abnormalities or structural lesion seen there. Clinical correlation recommended.       Talbert Merlin, MD      [id number]  D:  2022 22:17  T:  2022 4:21  JOB #:  [id number]  CC:  Ashely Owens MD

## 2022-08-25 NOTE — PROGRESS NOTES
Speech pathology note  Reviewed chart and discussed case with RN. Attempted to see patient for swallowing re-evaluation, however patient lethargic and did not arouse despite verbal/tactile stimulation, elevating HOB, wet washcloth on face, or cold/wet spoon to lips. Patient not appropriate for PO consideration at this time secondary to lethargy, and recommend continued NPO including no oral meds. SLP will continue to follow. Thank you.     Yi Juárez, GINGER-SLP

## 2022-08-25 NOTE — PROGRESS NOTES
End of Shift Note     Bedside shift change report given to Zach Mendiola RN (oncoming nurse) by Shad Ruiz (offgoing nurse). Report included the following information SBAR, Kardex, and MAR     Shift worked: Night    Shift summary and any significant changes:     No significant changes   Concerns for physician to address:  None    Zone phone for oncoming shift:  [phone]      Patient Information  Shira Soliz  71 y.o.  8/23/2022 11:14 AM by Tena Guillaume MD. Shira Soliz was admitted from Home     Problem List       Patient Active Problem List     Diagnosis Date Noted    CVA (cerebral vascular accident) ([address] 08/23/2022    VINICIO (acute kidney injury) ([address] 08/23/2022    Hypertensive urgency 08/23/2022    Acute encephalopathy 08/23/2022    Bilateral carotid artery stenosis 08/23/2022    Thrombotic stroke involving left middle cerebral artery ([address] 08/23/2022    Aphasia due to acute cerebrovascular accident (CVA) ([address] 08/23/2022    Acute alteration in mental status 08/23/2022    Convulsions ([address] 08/23/2022    History of stroke 08/23/2022    Prostate cancer ([address] 08/23/2022           Past Medical History:   Diagnosis Date    History of stroke      Prostate CA ([address]           Core Measures:  CVA: Yes   CHF:  PNA:     Activity:  Activity Level: Bed Rest  Number times ambulated in hallways past shift: 0  Number of times OOB to chair past shift: 0     Cardiac:   Cardiac Monitoring: Yes      Cardiac Rhythm: Sinus Rhythm     Access:   Current line(s): PIV   Central Line? No   PICC LINE? No      Genitourinary:   Urinary status: Voiding, External Catheter   Urinary Catheter?  No  Respiratory:   O2 Device: None (Room air)  Chronic home O2 use?: NO  Incentive spirometer at bedside: NO     GI:  Last Bowel Movement Date:  (PTA)  Current diet:  DIET NPO Sips of Water with Meds  Passing flatus: YES  Tolerating current diet: NO     Pain Management:   Patient states pain is manageable on current regimen: N/A     Skin:  Shilo Score: 16  Interventions: PT/OT consult and internal/external urinary devices    Patient Safety:  Fall Score: Total Score: 3  Interventions: bed/chair alarm, gripper socks, and pt to call before getting OOB  High Fall Risk: Yes     DVT prophylaxis:  DVT prophylaxis Med- Yes  DVT prophylaxis SCD or CORRINE- No      Wounds: (If Applicable)  Wounds- No  Location         Active Consults:  IP CONSULT TO HOSPITALIST  IP CONSULT TO NEUROLOGY     Length of Stay:  Expected LOS: - - -  Actual LOS: 1  Discharge Plan:  Yes

## 2022-08-26 NOTE — PROGRESS NOTES
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 He has been taking any bp meds or not seen any physician    PLAN-  Continue ivf  Get lab now  Check serology  Continue iv bp meds  Hopefully his renal function will improve. He won't be a good longterm hd candidate with current mental status. Subjective: Interval History:   -  no labs done today  Urine out put 860 ml  Ros- Can't assess due to patient's current condition       Objective:   Vitals:    08/25/22 1930 08/25/22 2330 08/26/22 0318 08/26/22 0806   BP: (!) 172/85 (!) 154/84 (!) 140/73 130/69   Pulse: (!) 102 91 95 (!) 102   Resp: 20 20 20 20   Temp: 98.6 °F (37 °C) 98.4 °F (36.9 °C) 98.8 °F (37.1 °C) 98.7 °F (37.1 °C)   SpO2: 91% 91% (!) 89% 95%   Weight:       Height:          08/25 0701 - 08/26 0700  In: 0   Out: 18 [Urine:860]  Last 3 Recorded Weights in this Encounter    08/24/22 1618   Weight: 73 kg (160 lb 15 oz)      Physical exam:    GEN: NAD  NECK- Supple, no mass  RESP: No wheezing, Clear b/l  CVS: S1,S2  RRR  NEURO: Can't assess due to patient's current condition   EXT: No Edema   PSYCH: Can't assess due to patient's current condition   Thompson +    Chart reviewed. Pertinent Notes reviewed.      Data Review :  Recent Labs     08/25/22  1034 08/24/22  0032   * 144   K 3.9 3.8   * 110* CO2 23 27   BUN 35* 30*   CREA 3.28* 3.04*   * 105*   CA 9.0 9.0     Recent Labs     08/24/22  0032   WBC 4.7   HGB 9.3*   HCT 28.3*        No results for input(s): FE, TIBC, PSAT, FERR in the last 72 hours.    Medication list  reviewed  Current Facility-Administered Medications   Medication Dose Route Frequency    dextrose 5 % - 0.45% NaCl infusion  75 mL/hr IntraVENous CONTINUOUS    labetaloL (NORMODYNE;TRANDATE) injection 10 mg  10 mg IntraVENous QID    hydrALAZINE (APRESOLINE) 20 mg/mL injection 20 mg  20 mg IntraVENous Q6H PRN    hydrALAZINE (APRESOLINE) 20 mg/mL injection 10 mg  10 mg IntraVENous Q6H    amLODIPine (NORVASC) tablet 10 mg  10 mg Oral DAILY    clopidogreL (PLAVIX) tablet 75 mg  75 mg Oral DAILY    labetaloL (NORMODYNE;TRANDATE) injection 10 mg  10 mg IntraVENous Q2H PRN    acetaminophen (TYLENOL) tablet 650 mg  650 mg Oral Q4H PRN    Or    acetaminophen (TYLENOL) solution 650 mg  650 mg Per NG tube Q4H PRN    Or    acetaminophen (TYLENOL) suppository 650 mg  650 mg Rectal Q4H PRN    aspirin chewable tablet 81 mg  81 mg Oral DAILY    atorvastatin (LIPITOR) tablet 40 mg  40 mg Oral QHS    heparin (porcine) injection 5,000 Units  5,000 Units SubCUTAneous Q8H    cefTRIAXone (ROCEPHIN) 1 g in 0.9% sodium chloride (MBP/ADV) 50 mL MBP  1 g IntraVENous Q24H        Pj Gregory MD  8/26/2022

## 2022-08-26 NOTE — PROGRESS NOTES
Transition of Care Plan:     RUR:  14%  Disposition:  SNF?? Home with family w/ HH   Follow up appointments:  needs new PCP  DME needed:  TBD  Transportation at Discharge:  TBD  Keys or means to access home:    TBD    IM Medicare Letter:  to be given closer to dc  Is patient a  and connected with the [place]? TBD           If yes, was [employer] transfer form completed and [facility] notified? Caregiver Contact:  wife: Luis Do: [phone]  Discharge Caregiver contacted prior to discharge? Yes   Care Conference needed?:    No        No emergency contact listed on the chart. CM called the contact listed as Pt's personal number:[phone]. Pt's wife Luis Mckeon answered. Per the Pt's wife, the Pt was walking with assistance at baseline. Pt's wife stated that she anticipates bringing the Pt home at discharge if he is not needing rehab. Pt's wife stated that the Pt's son would be visiting with the Pt today.        Darryl Vega, [facility], CM  [place]

## 2022-08-26 NOTE — PROGRESS NOTES
Consult  REFERRED BY:  None    CHIEF COMPLAINT: Sudden loss of speech on awakening this morning      Subjective:     Sandi Avila is a 71 y.o. right-handed -American male, seen at the request of the hospitalist, for evaluation of new problem of persistent lethargy since admission, when he was found to have a small left thalamic infarct causing his aphasia, but he remained drowsy, so his Keppra is being discontinued that was started on admission for some reason, and his repeat CT scan showed no other new lesions and was stable showing no other central cause of his sedation. Nephrology is going to see the patient for his renal disease and that may help some also. He moves all extremities and does not have any other focal deficit other than just lethargy and aphasia. Patient seen initially for sudden loss of speech that was present when he awoke this morning, he is not able to say any words since then. The ex-wife has not noticed any new focal weakness or sensory loss, they have been able to walk and move about okay. He did have a previous stroke about 22 years ago, but did not have any significant residual weakness after that. She apparently picked him up about 9 months ago in [place] where he was found on the streets wandering, living by himself but not taking care of himself, and she brought him back to [place] has not seen a physician since then because he refuses to go and is taking no medication. He has been started on aspirin therapy and high-dose statin for now. He had a CTA of the head neck that was negative except for some left posterior cerebral artery stenosis, and a CT of the head that was unremarkable except for old microvascular disease. His MRI shows acute left thalamic infarct which probably accounts for his aphasia, and his EEG does not show any seizures, just slowing generalized, worse in the left temporal area, and his carotid Dopplers do not show any surgical disease.   We will continue current therapy and see how he does, he is still not talking much at all and more drowsy today. Patient still very lethargic, and not really verbalizing much, and seems to have increased weakness on the right side, I talked to the wife about getting another scan but she said no she does not want another scan, she said that he is better than he was before today, and wants to just wait and see if anything else changes first.  Renal is seeing him for his renal failure, and treating that might help some, and his Keppra has been stopped also. Wife does not want any more testing done from our standpoint we will follow as needed, he has had a stroke, on antiplatelet therapy, so if we can help in the future, please call. Past Medical History:   Diagnosis Date    History of stroke     Prostate [facility])       History reviewed. No pertinent surgical history. History reviewed. No pertinent family history.    Social History     Tobacco Use    Smoking status: Never    Smokeless tobacco: Never   Substance Use Topics    Alcohol use: Not on file         Current Facility-Administered Medications:     thiamine (B-1) 200 mg in 0.9% sodium chloride 50 mL IVPB, 200 mg, IntraVENous, DAILY, Lani Stephen MD    dextrose 5 % - 0.45% NaCl infusion, 75 mL/hr, IntraVENous, CONTINUOUS, Han Barraza MD, Last Rate: 75 mL/hr at 08/26/22 0632, 75 mL/hr at 08/26/22 6387    labetaloL (NORMODYNE;TRANDATE) injection 10 mg, 10 mg, IntraVENous, QID, Alec BRODERICK MD, 10 mg at 08/26/22 0900    hydrALAZINE (APRESOLINE) 20 mg/mL injection 20 mg, 20 mg, IntraVENous, Q6H PRN, Alec BRODERICK MD    hydrALAZINE (APRESOLINE) 20 mg/mL injection 10 mg, 10 mg, IntraVENous, Q6H, Lester Coleman MD, 10 mg at 08/26/22 0630    amLODIPine (NORVASC) tablet 10 mg, 10 mg, Oral, DAILY, Han Barraza MD, 10 mg at 08/24/22 1050    clopidogreL (PLAVIX) tablet 75 mg, 75 mg, Oral, DAILY, Eugene Curry MD, 75 mg at 08/24/22 1050    labetaloL (NORMODYNE;TRANDATE) injection 10 mg, 10 mg, IntraVENous, Q2H PRN, Lionel Brito MD, 10 mg at 08/25/22 1239    acetaminophen (TYLENOL) tablet 650 mg, 650 mg, Oral, Q4H PRN **OR** acetaminophen (TYLENOL) solution 650 mg, 650 mg, Per NG tube, Q4H PRN **OR** acetaminophen (TYLENOL) suppository 650 mg, 650 mg, Rectal, Q4H PRN, Racheal Champion MD    aspirin chewable tablet 81 mg, 81 mg, Oral, DAILY, Corrine Rondon MD, 81 mg at 08/24/22 1050    atorvastatin (LIPITOR) tablet 40 mg, 40 mg, Oral, QHS, Racheal Champion MD    heparin (porcine) injection 5,000 Units, 5,000 Units, SubCUTAneous, Q8H, Corrine Rondon MD, 5,000 Units at 08/26/22 0630    cefTRIAXone (ROCEPHIN) 1 g in 0.9% sodium chloride (MBP/ADV) 50 mL MBP, 1 g, IntraVENous, Q24H, Corrine Rondon MD, Last Rate: 100 mL/hr at 08/25/22 2015, 1 g at 08/25/22 2015        No Known Allergies   MRI Results (most recent):  Results from Hospital Encounter encounter on 08/23/22    MRI BRAIN WO CONT    Narrative  INDICATION:   CVA work-up    EXAMINATION:  MRI BRAIN WO CONTRAST    COMPARISON:  CTA earlier today    TECHNIQUE:  Multiplanar multisequence acquisition without contrast of the brain. FINDINGS:    Ventricles:  Midline, no hydrocephalus. Brain Parenchyma/Brainstem:  Chronic infarction left cerebellum. Moderate  chronic microvascular ischemic disease in the supratentorial brain with small  areas of chronic bilateral corona radiata infarction. Small acute infarction  ventral left thalamus. Intracranial Hemorrhage:  None. Basal Cisterns:  Normal.  Flow Voids:  Normal.  Additional Comments:  Diminished T1 marrow signal cervical spine. Impression  1. Small acute infarction ventral left thalamus. 2. Chronic microvascular ischemic disease and areas of chronic infarction as  above. 3. Nonspecific marrow signal cervical spine.       Results from [place] encounter on 08/23/22    MRI BRAIN WO CONT    Narrative  INDICATION:   CVA work-up    EXAMINATION: MRI BRAIN WO CONTRAST    COMPARISON:  CTA earlier today    TECHNIQUE:  Multiplanar multisequence acquisition without contrast of the brain. FINDINGS:    Ventricles:  Midline, no hydrocephalus. Brain Parenchyma/Brainstem:  Chronic infarction left cerebellum. Moderate  chronic microvascular ischemic disease in the supratentorial brain with small  areas of chronic bilateral corona radiata infarction. Small acute infarction  ventral left thalamus. Intracranial Hemorrhage:  None. Basal Cisterns:  Normal.  Flow Voids:  Normal.  Additional Comments:  Diminished T1 marrow signal cervical spine. Impression  1. Small acute infarction ventral left thalamus. 2. Chronic microvascular ischemic disease and areas of chronic infarction as  above. 3. Nonspecific marrow signal cervical spine. Review of Systems:  Review of systems not obtained due to patient factors. Vitals:    08/25/22 1930 08/25/22 2330 08/26/22 0318 08/26/22 0806   BP: (!) 172/85 (!) 154/84 (!) 140/73 130/69   Pulse: (!) 102 91 95 (!) 102   Resp: 20 20 20 20   Temp: 98.6 °F (37 °C) 98.4 °F (36.9 °C) 98.8 °F (37.1 °C) 98.7 °F (37.1 °C)   SpO2: 91% 91% (!) 89% 95%   Weight:       Height:         Objective:     I      NEUROLOGICAL EXAM:    Appearance: The patient is well developed, well nourished, aphasic and provides no history   Mental Status: Aphasic and provides no history, may be able to get the word yes out once   Cranial Nerves:   Unreliable visual fields. Fundi are benign, disc are flat, no lesions seen on funduscopy. CRISTOBAL, EOM's full, no nystagmus, no ptosis. Facial sensation is normal. Corneal reflexes are not tested. Facial movement is show central facial weakness on the right. Hearing is abnormal bilaterally. Palate is midline with normal sternocleidomastoid and trapezius muscles are normal. Tongue is midline.   Neck without meningismus or bruits  Temporal arteries are not tender or enlarged  TMJ areas are not tender on palpation   Motor: 4/5 strength in upper and lower proximal and distal muscles. Normal bulk and tone. No fasciculations. Rapid alternating movement is symmetric and intact bilaterally   Reflexes:   Deep tendon reflexes 1+/4 and symmetrical.  No babinski or clonus present   Sensory:   Normal to touch, pinprick and vibration and temperature are unreliable. DSS is unreliable   Gait:  Not tested   Tremor:   No tremor noted. Cerebellar:  Not tested abnormal cerebellar signs present on Romberg and tandem testing and finger-nose-finger exam.   Neurovascular:  Normal heart sounds and regular rhythm, peripheral pulses decreased and no carotid bruits. Assessment:       ICD-10-CM ICD-9-CM    1. Acute encephalopathy  G93.40 348.30       2. Acute cystitis with hematuria  N30.01 595.0         Active Problems:    CVA (cerebral vascular accident) ([address] (8/23/2022)      VINICIO (acute kidney injury) ([address] (8/23/2022)      Hypertensive urgency (8/23/2022)      Acute encephalopathy (8/23/2022)      Bilateral carotid artery stenosis (8/23/2022)      Thrombotic stroke involving left middle cerebral artery ([address] (8/23/2022)      Aphasia due to acute cerebrovascular accident (CVA) ([address] (8/23/2022)      Acute alteration in mental status (8/23/2022)      Convulsions ([address] (8/23/2022)      History of stroke (8/23/2022)      Prostate cancer ([address] (8/23/2022)      Plan:     Patient with sudden onset aphasia, most likely secondary to CVA, will await the MRI scan, continue aspirin therapy and high-dose statin for now, we will follow carefully with you, have discussed with the wife in detail, and he obviously needs to get a medical doctor here because of his recurrent stroke again. May need inpatient rehab. Need to rule out cardiac source so complete neurovascular work-up has been ordered. Will check Doppler and EEG also.   I reviewed his CTA and CT personally, agree with reports as above, they are unremarkable and I discussed his condition with the patient and his wife and discussed treatment options and plans with her in detail also. His MRI shows acute left thalamic infarct which probably accounts for his aphasia, and his EEG does not show any seizures, just slowing generalized, worse in the left temporal area, and his carotid Dopplers do not show any surgical disease. We will continue current therapy and see how he does, he is still not talking much at all and more drowsy today. Patient lethargic, but his repeat CT scan shows no structural lesion for the brain, and we stopped his Keppra which he was placed on for some reason when he got admitted, hopefully that will help, and renal see him for his kidney disease, hopefully that will help some too, we will follow. Discussed with the hospitalist and the nursing staff. Reviewed his CT scan for standard pack system and I agree with report, and I agree with stopping his Keppra as above. Patient still very lethargic, and not really verbalizing much, and seems to have increased weakness on the right side, I talked to the wife about getting another scan but she said no she does not want another scan, she said that he is better than he was before today, and wants to just wait and see if anything else changes first.  Renal is seeing him for his renal failure, and treating that might help some, and his Keppra has been stopped also. Wife does not want any more testing done from our standpoint we will follow as needed, he has had a stroke, on antiplatelet therapy, so if we can help in the future, please call.     Signed By: Jody Foley MD     August 26, 2022       CC: None  FAX: None

## 2022-08-26 NOTE — PROGRESS NOTES
Bedside shift change report given to Shad Myers RN  (oncoming nurse) by Anna Sidhu RN (offgoing nurse).   Report included the following information SBAR, Kardex, Intake/Output, and MAR

## 2022-08-26 NOTE — PROGRESS NOTES
Occupational Therapy note:    Chart reviewed and attempted to see patient for OT tx session. Patient in bed sleeping and wouldn't open eyes to multimodal cues. Attempted sternal rub with eyes opening momentarily but unable to remain awake for session. Will defer and continue to follow when patient appropriate.     Amie Dillard, OTR/L

## 2022-08-26 NOTE — CONSULTS
[facility]    Name:  Gail Draper  MR#:  [id number]  :  1950  ACCOUNT #:  [de-identified]  DATE OF SERVICE:  2022    HISTORY OF PRESENT ILLNESS:  The patient is a 28-year-old [facility] American male with past medical history of hypertension, he was admitted with confusion. The patient is confused, no family member at the bedside. I cannot obtain any history from the patient. I have reviewed patient's current chart. The patient is found to have a renal failure with creatinine level of 3.28, BUN 35, and sodium of 146. The patient's blood pressure has been elevated during this admission in the last 48 hours. Systolic blood pressure has been ranging from 160-210. The patient has history of hypertension. The patient has UTI on admission. PAST MEDICAL HISTORY:  1. Hypertension. 2.  CVA. PAST SURGICAL HISTORY:  Cannot be obtained. SOCIAL HISTORY:  Cannot be obtained due to confusion. FAMILY HISTORY:  Cannot be obtained due to confusion. REVIEW OF SYSTEMS:  Cannot be obtained due to confusion. PHYSICAL EXAMINATION:  VITAL SIGNS:  Temperature 98.5, pulse 93, blood pressure 162/89, respiratory rate 18 per minute, oxygen saturation 93%. GENERAL:  The patient is lying in the bed, comfortable, not in apparent distress. Not following any command. NECK:  Supple. No palpable mass. LUNGS:  Clear to auscultation bilaterally. No wheezing or crackles. CARDIAC:  Normal S1, S2.  Regular rate and rhythm. ABDOMEN:  Soft, nontender. Bowel sounds present. EXTREMITIES:  No edema. NEUROLOGIC:  Cannot be assessed due to confusion. PSYCHIATRIC:  Cannot be assessed due to confusion. :  No Thompson catheter. SKIN:  No rash. JOINT:  No joint effusion or tenderness. LABORATORY DATA:  Sodium 146, potassium 3.9, chloride 111, BUN 35, creatinine 3.28, calcium 9.    UA positive for protein. Positive for blood, morbid epithelial cells.   Negative for nitrite, negative for leukocyte esterase. Renal ultrasound, no hydronephrosis. Right kidney 9.8, left kidney 10.2 cm. IMPRESSION:  1. Acute kidney injury due to dehydration and hypertensive nephrosclerosis and hypertensive nephropathy. 2.  Chronic kidney disease due to hypertensive nephrosclerosis, unknown baseline. 3.  Proteinuria. 4.  Altered mental status. 5.  History of stroke. 6.  Hypernatremia. PLAN:  1. Continue hypertonic IV fluids. 2.  Check urine sodium, creatinine. 3.  Check urine protein-creatinine ratio. 4.  Change blood pressure medications to IV. 5.  Start IV labetalol 10 mg q. 6 hours. 6.  Start IV hydralazine 10 mg q. 6 hours. 7.  Check ANCA, anti-GBM, SPEP, hepatitis panel. 8.  IVON is negative. 9.  Stop antibiotics as the patient does not have any UTI. 10.  Place a Thompson catheter for strict Is and Os and with ongoing confusion, the patient may not be able to void. Thanks for consultation. We will follow the patient with you.       Faustino Kelly MD      [id number]  D:  08/25/2022 16:41  T:  08/25/2022 20:46  JOB #:  [id number]

## 2022-08-26 NOTE — PROGRESS NOTES
Chart reviewed and discussed patient with OT. He is currently too lethargic to effectively participate with PT at this time. Will defer and continue to follow.     Pebbles Alfredo, PT

## 2022-08-26 NOTE — PROGRESS NOTES
PALLIATIVE MEDICINE      Palliative Medicine  consult received, due to high consult volume will see pt Monday. Thank you for including Palliative Medicine in this patient's care.    Margorie Crigler, FNP-C

## 2022-08-26 NOTE — PROGRESS NOTES
Report given to [facility]. Medications, pt's plan of care reviewed at bedside and all questions answered at this time.

## 2022-08-26 NOTE — PROGRESS NOTES
Participated in Palliative IDT where pt was discussed.   Lawrence Lanes, M.Div, [facility]   Paging Service [phone]

## 2022-08-26 NOTE — PROGRESS NOTES
Hospitalist Progress Note    NAME: Ricardo Ames   :  1950   MRN:  [id number]       Assessment / Plan:    Acute metabolic encephalopathy POA  Acute left thalamic CVA POA  HTN emergency /111  History of CVA with no residual weakness as per family, POA  - Bed bound x 2 years, off medications, recently moved from [facility]   Bed bound due to sciatica  - Brought in with inability to talk, lethargy  - pCXR with no ASD  - VBG 7.30, pCO2 54  - Currently on 2 liters  - Head CT IMPRESSION    Chronic white matter disease and areas of remote infarction. No acute process by CT  - CTA head and neck IMPRESSION  CTA Head:  1. No evidence of flow-limiting stenosis or aneurysm. Multifocal moderate stenoses in the left P2 segment. CTA Neck:  1. No evidence of significant stenosis. - MRI brain showed small acute infarction ventral left thalamus and chronic changes  - CT negative for LVO  - EEG negative for seizure  - Continue aspirin, Plavix, statin  - AMS worsening        CT head negative IMPRESSION  1. No evidence of acute intracranial abnormality. 2. Chronic microangiopathic white matter disease.  - Started on Keppra at admit, EEG was negative,          Keppra stopped as he also has significant kidney injury, ? Contributing to lethargy  - Blood pressure improved since admission, continue amlodipine.     Add metoprolol IV scheduled with holding parameters  - Check pCXR and ABG, ammonia, procalcitonin, UA  - Add IV thiamine  - Spoke with wife at bedside    Probable stage 4 chronic kidney disease POA  Hypernatremia Na 146 POA  - Creatinine around 3 to 3.2  - No previous baseline, suspect he is at baseline  - Renal ultrasound without hydronephrosis  - IVF without improvement  - Appreciate Dr Bertha Wang input     Acute urinary tract infection POA  - UA with 5 to 10 WBC, 5 to 10 RBC, 1+ bacteria  - S/p ceftriaxone     Code Status: Full code  Surrogate Decision Maker: Patient's wife      DVT Prophylaxis: Heparin  GI Prophylaxis: not indicated     Baseline: Bedridden for almost 2 years from sciatica otherwise alert oriented x4 as per patient's son at bedside    Subjective:     Chief Complaint / Reason for Physician Visit  Lethargic, not able to talk or provide history  Seen with wife at bedside    Review of Systems:  Symptom Y/N Comments  Symptom Y/N Comments   Fever/Chills    Chest Pain     Poor Appetite    Edema     Cough    Abdominal Pain     Sputum    Joint Pain     SOB/HO    Pruritis/Rash     Nausea/vomit    Tolerating PT/OT     Diarrhea    Tolerating Diet     Constipation    Other       Could NOT obtain due to: AMS, non verbal     Objective:     VITALS:   Last 24hrs VS reviewed since prior progress note. Most recent are:  Patient Vitals for the past 24 hrs:   Temp Pulse Resp BP SpO2   08/26/22 0806 98.7 °F (37.1 °C) (!) 102 20 130/69 95 %   08/26/22 0318 98.8 °F (37.1 °C) 95 20 (!) 140/73 (!) 89 %   08/25/22 2330 98.4 °F (36.9 °C) 91 20 (!) 154/84 91 %   08/25/22 1930 98.6 °F (37 °C) (!) 102 20 (!) 172/85 91 %   08/25/22 1749 98.9 °F (37.2 °C) 92 26 (!) 156/82 91 %   08/25/22 1600 -- 97 -- -- --   08/25/22 1509 98.5 °F (36.9 °C) 93 18 (!) 163/89 93 %         Intake/Output Summary (Last 24 hours) at 8/26/2022 1303  Last data filed at 8/26/2022 6342  Gross per 24 hour   Intake --   Output 460 ml   Net -460 ml          I had a face to face encounter and independently examined this patient on 8/26/2022, as outlined below:  PHYSICAL EXAM:  General: WD, WN. Alert, cooperative, no acute distress    EENT:   Anicteric sclerae. MMM  Resp:  Rhonchi bilaterally, no wheezing or rales. No accessory muscle use  CV:  Regular  rhythm,  No edema  GI:  Soft, Non distended, Non tender. +Bowel sounds  Neurologic:  Lethargic, not talking or following commands  Psych:   lethargic  Skin:  No rashes.   No jaundice    Reviewed most current lab test results and cultures  YES  Reviewed most current radiology test results   YES  Review and summation of old records today    NO  Reviewed patient's current orders and MAR    YES  PMH/SH reviewed - no change compared to H&P  ________________________________________________________________________  Care Plan discussed with:    Comments   Patient x    Family  x    RN x    Care Manager     Consultant                        Multidiciplinary team rounds were held today with , nursing, pharmacist and clinical coordinator. Patient's plan of care was discussed; medications were reviewed and discharge planning was addressed. ________________________________________________________________________      Comments   >50% of visit spent in counseling and coordination of care     ________________________________________________________________________  Minh Perez MD     Procedures: see electronic medical records for all procedures/Xrays and details which were not copied into this note but were reviewed prior to creation of Plan. LABS:  I reviewed today's most current labs and imaging studies.   Pertinent labs include:  Recent Labs     08/24/22  0032   WBC 4.7   HGB 9.3*   HCT 28.3*          Recent Labs     08/25/22  1034 08/24/22  0032   * 144   K 3.9 3.8   * 110*   CO2 23 27   * 105*   BUN 35* 30*   CREA 3.28* 3.04*   CA 9.0 9.0   ALB  --  3.4*   TBILI  --  0.7   ALT  --  18         Signed: Minh Perez MD

## 2022-08-26 NOTE — PROGRESS NOTES
Spiritual Care Assessment/Progress Note  [facility]      NAME: Peter Blood      MRN: [id number]  AGE: 71 y.o. SEX: male  Latter-day Affiliation: Protestant   Language: English     8/26/2022     Total Time (in minutes): 5     Spiritual Assessment begun in [facility] through conversation with:         []Patient        [] Family    [] Friend(s)        Reason for Consult: Palliative Care, Initial/Spiritual Assessment     Spiritual beliefs: (Please include comment if needed)     [] Identifies with a pio tradition:         [] Supported by a pio community:            [] Claims no spiritual orientation:           [] Seeking spiritual identity:                [] Adheres to an individual form of spirituality:           [x] Not able to assess:                           Identified resources for coping:      [] Prayer                               [] Music                  [] Guided Imagery     [] Family/friends                 [] Pet visits     [] Devotional reading                         [] Unknown     [] Other:                                              Interventions offered during this visit: (See comments for more details)    Patient Interventions: Initial visit           Plan of Care:     [] Support spiritual and/or cultural needs    [] Support AMD and/or advance care planning process      [] Support grieving process   [] Coordinate Rites and/or Rituals    [] Coordination with community clergy   [] No spiritual needs identified at this time   [] Detailed Plan of Care below (See Comments)  [] Make referral to Music Therapy  [] Make referral to Pet Therapy     [] Make referral to Addiction services  [] Make referral to [facility]  [] Make referral to Spiritual Care Partner  [] No future visits requested        [x] Contact Spiritual Care for further referrals     Comments: Initial spiritual assessment attempted with palliative pt in 3123. Pt soundly sleeping.  No family present. Left card on tray table. Silent prayer on behalf of pt. Contact Spiritual Care for any further referrals.  Molly Ruano M.Div, [facility]   Paging Service [phone]

## 2022-08-26 NOTE — PROGRESS NOTES
Consult  REFERRED BY:  None    CHIEF COMPLAINT: Sudden loss of speech on awakening this morning      Subjective:     Shara Sánchez is a 71 y.o. right-handed -American male, seen at the request of the hospitalist, for evaluation of new problem of persistent lethargy since admission, when he was found to have a small left thalamic infarct causing his aphasia, but he remained drowsy, so his Keppra is being discontinued that was started on admission for some reason, and his repeat CT scan showed no other new lesions and was stable showing no other central cause of his sedation. Nephrology is going to see the patient for his renal disease and that may help some also. He moves all extremities and does not have any other focal deficit other than just lethargy and aphasia. Patient seen initially for sudden loss of speech that was present when he awoke this morning, he is not able to say any words since then. The ex-wife has not noticed any new focal weakness or sensory loss, they have been able to walk and move about okay. He did have a previous stroke about 22 years ago, but did not have any significant residual weakness after that. She apparently picked him up about 9 months ago in [place] where he was found on the streets wandering, living by himself but not taking care of himself, and she brought him back to [facility] has not seen a physician since then because he refuses to go and is taking no medication. He has been started on aspirin therapy and high-dose statin for now. He had a CTA of the head neck that was negative except for some left posterior cerebral artery stenosis, and a CT of the head that was unremarkable except for old microvascular disease. His MRI shows acute left thalamic infarct which probably accounts for his aphasia, and his EEG does not show any seizures, just slowing generalized, worse in the left temporal area, and his carotid Dopplers do not show any surgical disease.   We will continue current therapy and see how he does, he is still not talking much at all and more drowsy today. Past Medical History:   Diagnosis Date    History of stroke     Prostate CA [facility])       History reviewed. No pertinent surgical history. History reviewed. No pertinent family history.    Social History     Tobacco Use    Smoking status: Never    Smokeless tobacco: Never   Substance Use Topics    Alcohol use: Not on file         Current Facility-Administered Medications:     dextrose 5 % - 0.45% NaCl infusion, 75 mL/hr, IntraVENous, CONTINUOUS, Andrew Madsen MD, Last Rate: 75 mL/hr at 08/25/22 1542, 75 mL/hr at 08/25/22 1542    labetaloL (NORMODYNE;TRANDATE) injection 10 mg, 10 mg, IntraVENous, QID, David BRODERIKC MD, 10 mg at 08/25/22 1749    hydrALAZINE (APRESOLINE) 20 mg/mL injection 20 mg, 20 mg, IntraVENous, Q6H PRN, Nayla Genao MD    hydrALAZINE (APRESOLINE) 20 mg/mL injection 10 mg, 10 mg, IntraVENous, Q6H, Lester Coleman MD    amLODIPine (NORVASC) tablet 10 mg, 10 mg, Oral, DAILY, Andrew Madsen MD, 10 mg at 08/24/22 1050    clopidogreL (PLAVIX) tablet 75 mg, 75 mg, Oral, DAILY, Sandrine López MD, 75 mg at 08/24/22 1050    labetaloL (NORMODYNE;TRANDATE) injection 10 mg, 10 mg, IntraVENous, Q2H PRN, Andrew Madsen MD, 10 mg at 08/25/22 1239    acetaminophen (TYLENOL) tablet 650 mg, 650 mg, Oral, Q4H PRN **OR** acetaminophen (TYLENOL) solution 650 mg, 650 mg, Per NG tube, Q4H PRN **OR** acetaminophen (TYLENOL) suppository 650 mg, 650 mg, Rectal, Q4H PRN, Ligia Rondon MD    aspirin chewable tablet 81 mg, 81 mg, Oral, DAILY, Gabrielle Coleman MD, 81 mg at 08/24/22 1050    atorvastatin (LIPITOR) tablet 40 mg, 40 mg, Oral, QHS, Gabrielle Coleman MD    heparin (porcine) injection 5,000 Units, 5,000 Units, SubCUTAneous, Q8H, Corrine Rondon MD, 5,000 Units at 08/25/22 1545    cefTRIAXone (ROCEPHIN) 1 g in 0.9% sodium chloride (MBP/ADV) 50 mL MBP, 1 g, IntraVENous, Q24H, Gabrielle Coleman MD, Last Rate: 100 mL/hr at 08/25/22 2015, 1 g at 08/25/22 2015        No Known Allergies   MRI Results (most recent):  Results from Hospital Encounter encounter on 08/23/22    MRI BRAIN WO CONT    Narrative  INDICATION:   CVA work-up    EXAMINATION:  MRI BRAIN WO CONTRAST    COMPARISON:  CTA earlier today    TECHNIQUE:  Multiplanar multisequence acquisition without contrast of the brain. FINDINGS:    Ventricles:  Midline, no hydrocephalus. Brain Parenchyma/Brainstem:  Chronic infarction left cerebellum. Moderate  chronic microvascular ischemic disease in the supratentorial brain with small  areas of chronic bilateral corona radiata infarction. Small acute infarction  ventral left thalamus. Intracranial Hemorrhage:  None. Basal Cisterns:  Normal.  Flow Voids:  Normal.  Additional Comments:  Diminished T1 marrow signal cervical spine. Impression  1. Small acute infarction ventral left thalamus. 2. Chronic microvascular ischemic disease and areas of chronic infarction as  above. 3. Nonspecific marrow signal cervical spine. Results from Hospital Encounter encounter on 08/23/22    MRI BRAIN WO CONT    Narrative  INDICATION:   CVA work-up    EXAMINATION:  MRI BRAIN WO CONTRAST    COMPARISON:  CTA earlier today    TECHNIQUE:  Multiplanar multisequence acquisition without contrast of the brain. FINDINGS:    Ventricles:  Midline, no hydrocephalus. Brain Parenchyma/Brainstem:  Chronic infarction left cerebellum. Moderate  chronic microvascular ischemic disease in the supratentorial brain with small  areas of chronic bilateral corona radiata infarction. Small acute infarction  ventral left thalamus. Intracranial Hemorrhage:  None. Basal Cisterns:  Normal.  Flow Voids:  Normal.  Additional Comments:  Diminished T1 marrow signal cervical spine. Impression  1. Small acute infarction ventral left thalamus. 2. Chronic microvascular ischemic disease and areas of chronic infarction as  above.   3. Nonspecific marrow signal cervical spine. Review of Systems:  Review of systems not obtained due to patient factors. Vitals:    08/25/22 1313 08/25/22 1509 08/25/22 1600 08/25/22 1749   BP:  (!) 163/89  (!) 156/82   Pulse:  93 97 92   Resp:  18  26   Temp:  98.5 °F (36.9 °C)  98.9 °F (37.2 °C)   SpO2:  93%  91%   Weight:       Height: 6' (1.829 m)        Objective:     I      NEUROLOGICAL EXAM:    Appearance: The patient is well developed, well nourished, aphasic and provides no history   Mental Status: Aphasic and provides no history, may be able to get the word yes out once   Cranial Nerves:   Unreliable visual fields. Fundi are benign, disc are flat, no lesions seen on funduscopy. CRISTOBAL, EOM's full, no nystagmus, no ptosis. Facial sensation is normal. Corneal reflexes are not tested. Facial movement is show central facial weakness on the right. Hearing is abnormal bilaterally. Palate is midline with normal sternocleidomastoid and trapezius muscles are normal. Tongue is midline. Neck without meningismus or bruits  Temporal arteries are not tender or enlarged  TMJ areas are not tender on palpation   Motor:  4/5 strength in upper and lower proximal and distal muscles. Normal bulk and tone. No fasciculations. Rapid alternating movement is symmetric and intact bilaterally   Reflexes:   Deep tendon reflexes 1+/4 and symmetrical.  No babinski or clonus present   Sensory:   Normal to touch, pinprick and vibration and temperature are unreliable. DSS is unreliable   Gait:  Not tested   Tremor:   No tremor noted. Cerebellar:  Not tested abnormal cerebellar signs present on Romberg and tandem testing and finger-nose-finger exam.   Neurovascular:  Normal heart sounds and regular rhythm, peripheral pulses decreased and no carotid bruits. Assessment:       ICD-10-CM ICD-9-CM    1. Acute encephalopathy  G93.40 348.30       2.  Acute cystitis with hematuria  N30.01 595.0         Active Problems:    CVA (cerebral vascular accident) [facility]) (8/23/2022)      VINICIO (acute kidney injury) ([address] (8/23/2022)      Hypertensive urgency (8/23/2022)      Acute encephalopathy (8/23/2022)      Bilateral carotid artery stenosis (8/23/2022)      Thrombotic stroke involving left middle cerebral artery ([address] (8/23/2022)      Aphasia due to acute cerebrovascular accident (CVA) ([address] (8/23/2022)      Acute alteration in mental status (8/23/2022)      Convulsions ([address] (8/23/2022)      History of stroke (8/23/2022)      Prostate cancer ([address] (8/23/2022)      Plan:     Patient with sudden onset aphasia, most likely secondary to CVA, will await the MRI scan, continue aspirin therapy and high-dose statin for now, we will follow carefully with you, have discussed with the wife in detail, and he obviously needs to get a medical doctor here because of his recurrent stroke again. May need inpatient rehab. Need to rule out cardiac source so complete neurovascular work-up has been ordered. Will check Doppler and EEG also. I reviewed his CTA and CT personally, agree with reports as above, they are unremarkable and I discussed his condition with the patient and his wife and discussed treatment options and plans with her in detail also. His MRI shows acute left thalamic infarct which probably accounts for his aphasia, and his EEG does not show any seizures, just slowing generalized, worse in the left temporal area, and his carotid Dopplers do not show any surgical disease. We will continue current therapy and see how he does, he is still not talking much at all and more drowsy today. Patient lethargic, but his repeat CT scan shows no structural lesion for the brain, and we stopped his Keppra which he was placed on for some reason when he got admitted, hopefully that will help, and renal see him for his kidney disease, hopefully that will help some too, we will follow. Discussed with the hospitalist and the nursing staff.   Reviewed his CT scan for standard pack system and I agree with report, and I agree with stopping his Keppra as above.   Signed By: Jovita Barroso MD     August 25, 2022       CC: None  FAX: None

## 2022-08-26 NOTE — PROGRESS NOTES
Pt has become less responsive to painful stimuli during follow up assessment. MD notified and advises he review pt's lab results and input orders as needed. Will continue to monitor.

## 2022-08-27 NOTE — PROGRESS NOTES
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 He has been taking any bp meds or not seen any physician    PLAN-  STOP IVF THIS PM  follow serology  Continue iv bp meds  Hopefully his renal function will improve. He won't be a good longterm hd candidate with current mental status. Subjective: Interval History:   -  cr high- stable  Bp  improving  Ros- Can't assess due to patient's current condition       Objective:   Vitals:    08/26/22 1456 08/26/22 1839 08/26/22 2217 08/26/22 2356   BP: 131/82 (!) 168/97 (!) 159/90 129/84   Pulse: (!) 104  90 78   Resp: 16   18   Temp: 99.5 °F (37.5 °C)   97.7 °F (36.5 °C)   SpO2: 94%   97%   Weight:       Height:          08/26 0701 - 08/27 0700  In: -   Out: 500 [Urine:500]  Last 3 Recorded Weights in this Encounter    08/24/22 1618   Weight: 73 kg (160 lb 15 oz)      Physical exam:    GEN:  NAD  NECK:  Supple, no thyromegaly  RESP: Clear  b/l, no  wheezing,   CVS: RRR,S1,S2   NEURO: Can't assess due to patient's current condition     EXT: no Edema +nt         Chart reviewed. Pertinent Notes reviewed.      Data Review :  Recent Labs     08/27/22  0459 08/26/22  1317 08/25/22  1034    144 146*   K 3.7 3.8 3.9   * 113* 111*   CO2 24 24 23   BUN 43* 42* 35*   CREA 3.92* 3.81* 3.28*   * 218* 184*   CA 8.6 8.6 9.0   PHOS 2.9 2.7  --      Recent Labs     08/27/22  0459 08/26/22  1317   WBC 5.2 4.3   HGB 9.4* 9.9*   HCT 29.4* 30.2*   * 152     No results for input(s): FE, TIBC, PSAT, FERR in the last 72 hours.    Medication list  reviewed  Current Facility-Administered Medications   Medication Dose Route Frequency    thiamine (B-1) 200 mg in 0.9% sodium chloride 50 mL IVPB  200 mg IntraVENous DAILY    dextrose 5 % - 0.45% NaCl infusion  50 mL/hr IntraVENous CONTINUOUS    labetaloL (NORMODYNE;TRANDATE) injection 10 mg  10 mg IntraVENous QID    hydrALAZINE (APRESOLINE) 20 mg/mL injection 20 mg  20 mg IntraVENous Q6H PRN    hydrALAZINE (APRESOLINE) 20 mg/mL injection 10 mg  10 mg IntraVENous Q6H    amLODIPine (NORVASC) tablet 10 mg  10 mg Oral DAILY    clopidogreL (PLAVIX) tablet 75 mg  75 mg Oral DAILY    labetaloL (NORMODYNE;TRANDATE) injection 10 mg  10 mg IntraVENous Q2H PRN    acetaminophen (TYLENOL) tablet 650 mg  650 mg Oral Q4H PRN    Or    acetaminophen (TYLENOL) solution 650 mg  650 mg Per NG tube Q4H PRN    Or    acetaminophen (TYLENOL) suppository 650 mg  650 mg Rectal Q4H PRN    aspirin chewable tablet 81 mg  81 mg Oral DAILY    atorvastatin (LIPITOR) tablet 40 mg  40 mg Oral QHS    heparin (porcine) injection 5,000 Units  5,000 Units SubCUTAneous Q8H    cefTRIAXone (ROCEPHIN) 1 g in 0.9% sodium chloride (MBP/ADV) 50 mL MBP  1 g IntraVENous Q24H        Jarrell Araujo MD  8/27/2022

## 2022-08-27 NOTE — PROGRESS NOTES
Pharmacy Antimicrobial Kinetic Dosing    Indication for Antimicrobials: Sepsis     Current Regimen of Each Antimicrobial:  Vancomycin- Pharmacy to Dose (Start Date ; Day # 1)  Cefepime 1g IV Q12H (Start Date 22; Day 1)    Previous Antimicrobial Therapy:      Goal Level: AUC: 400-600 mg/hr/Liter/day    Date Dose & Interval Measured (mcg/mL) Predicted AUC/JUANCHO                       Date & time of next level: TBD    Dosing calculator used: [employer] [place] calculator    Significant Positive Cultures:   None    Conditions for Dosing Consideration: None    Labs:  Recent Labs     229 22  1317 22  1034   CREA 3.92* 3.81* 3.28*   BUN 43* 42* 35*   PCT 18.90  --   --      Recent Labs     22  1317   WBC 5.2 4.3   BANDS 3 30     Temp (24hrs), Av.4 °F (36.9 °C), Min:97.7 °F (36.5 °C), Max:99.5 °F (37.5 °C)        Creatinine Clearance (mL/min):   CrCl (Ideal Body Weight): 19.0   If actual weight < IBW: CrCl (Actual Body Weight) 17.8    Impression/Plan:   Vancomycin dosed for predicted AUC of 463. Will monitor renal function for improvement  Continue Cefepime at current renally adjusted dose. Antimicrobial stop date TBD     Pharmacy will follow daily and adjust medications as appropriate for renal function and/or serum levels.     Thank you,  Purnima Johnson, SYL    Vancomycin Dosing Document    Documents located on pharmacy Teams site: Clinical Practice -> Antimicrobial Stewardship -> Antibiotics_Vancomycin     Aminoglycoside Dosing Document    Documents located on pharmacy Teams site: Clinical Practice -> Antimicrobial Stewardship -> Antibiotics_Aminoglycosides

## 2022-08-27 NOTE — PROGRESS NOTES
Hospitalist Progress Note    NAME: Shara Sánchez   :  1950   MRN:  [id number]       Assessment / Plan:    Acute metabolic encephalopathy POA  Acute left thalamic CVA POA  HTN emergency /111  History of CVA with no residual weakness as per family, POA  - Bed bound x 2 years, off medications, recently moved from [facility]   Bed bound due to sciatica  - Brought in with inability to talk, lethargy  - pCXR with no ASD  - VBG 7.30, pCO2 54  -Admit Head CT IMPRESSION    Chronic white matter disease and areas of remote infarction. No acute process by CT  - CTA head and neck IMPRESSION  CTA Head:  1. No evidence of flow-limiting stenosis or aneurysm. Multifocal moderate stenoses in the left P2 segment. CTA Neck:  1. No evidence of significant stenosis. - MRI brain IMPRESSION  Chronic infarction left cerebellum. Moderate chronic microvascular ischemic   disease in the supratentorial brain with small areas of chronic bilateral corona   radiata infarction. Small acute infarction ventral left thalamus. 1. Small acute infarction ventral left thalamus. 2. Chronic microvascular ischemic disease and areas of chronic infarction  - Echo LVEF 55 to 60%, normal wall motion, limited study  - EEG negative for seizure  - Continue aspirin, Plavix, statin  - AMS worsening  with ioncreased lethargy       CT head negative IMPRESSION  1. No evidence of acute intracranial abnormality. 2. Chronic microangiopathic white matter disease.  - Started on Keppra at admit, EEG was negative,          Keppra stopped as he also has significant kidney injury, ? Contributing to lethargy  - Blood pressure improved since admission, continue amlodipine.     Add metoprolol IV scheduled with holding parameters  -  work up for persistent lethargy   ABG 7.44, pCO2 33, pO2 75   Na 144   NH4 10   Repeat head CT with no acute change   Procalcitonin 18.9 on ceftriaxone   UA 0-4 WBC, > 100 RBC, negative bacteria  - IV thiamine  - Spoke with wife at bedside    ? Sepsis with elevated procalcitonin 18.9  - One low grade fever 100.3 since admit  - No leukocytosis, had 30% bands on labs yesterday, resolved  - Admit UA with 5 to 10 WBC, 5 to 10 RBC, 1+ bacteria   No urine culture sent  - repeat UA yesterday with 0-4 WBC  - Has been on ceftriaxone for a \"UTI\" since admit  - pt cannot verbalize complaints  - Check stat blood culture and lactate  - check CT scans chest/abdomen/pelvis  - Broaden antibiotics to vanco and cefepime once [facility] sent    Probable stage 4 chronic kidney disease POA  Hypernatremia Na 146 POA  - Creatinine around 3 to 3.2  - No previous baseline, suspect he is at baseline  - Renal ultrasound without hydronephrosis  - IVF without improvement  - Appreciate Dr Eloy Fitzgerald input     Code Status: Full code  Surrogate Decision Maker: Patient's wife      DVT Prophylaxis: Heparin  GI Prophylaxis: not indicated     Baseline: Bedridden for almost 2 years from sciatica otherwise alert oriented x4 as per patient's son at bedside    Subjective:     Chief Complaint / Reason for Physician Visit  Lethargic, Opened eyes, but not verbal for me  Able to say yes and no to NS earlier  Seen with wife at bedside    Review of Systems:  Symptom Y/N Comments  Symptom Y/N Comments   Fever/Chills    Chest Pain     Poor Appetite    Edema     Cough    Abdominal Pain     Sputum    Joint Pain     SOB/HO    Pruritis/Rash     Nausea/vomit    Tolerating PT/OT     Diarrhea    Tolerating Diet     Constipation    Other       Could NOT obtain due to: AMS, non verbal     Objective:     VITALS:   Last 24hrs VS reviewed since prior progress note.  Most recent are:  Patient Vitals for the past 24 hrs:   Temp Pulse Resp BP SpO2   08/27/22 0805 98 °F (36.7 °C) 92 16 138/79 97 %   08/26/22 2356 97.7 °F (36.5 °C) 78 18 129/84 97 %   08/26/22 2217 -- 90 -- (!) 159/90 --   08/26/22 1839 -- -- -- (!) 168/97 --   08/26/22 1456 99.5 °F (37.5 °C) (!) 104 16 131/82 94 %         Intake/Output Summary (Last 24 hours) at 8/27/2022 1009  Last data filed at 8/26/2022 1839  Gross per 24 hour   Intake --   Output 500 ml   Net -500 ml          I had a face to face encounter and independently examined this patient on 8/27/2022, as outlined below:    PHYSICAL EXAM:  General: WD, WN. Alert, cooperative, no acute distress    EENT:   Anicteric sclerae. MMM  Resp:  Rhonchi bilaterally, no wheezing or rales. No accessory muscle use  CV:  Regular  rhythm,  No edema  GI:  Soft, Non distended, No clear tenderness. +Bowel sounds  Neurologic:  Lethargic, not talking or following commands for me  Psych:   lethargic  Skin:  No rashes. No jaundice    Reviewed most current lab test results and cultures  YES  Reviewed most current radiology test results   YES  Review and summation of old records today    NO  Reviewed patient's current orders and MAR    YES  PMH/SH reviewed - no change compared to H&P  ________________________________________________________________________  Care Plan discussed with:    Comments   Patient x    Family  x    RN x    Care Manager     Consultant                        Multidiciplinary team rounds were held today with , nursing, pharmacist and clinical coordinator. Patient's plan of care was discussed; medications were reviewed and discharge planning was addressed. ________________________________________________________________________      Comments   >50% of visit spent in counseling and coordination of care     ________________________________________________________________________  Rita Alston MD     Procedures: see electronic medical records for all procedures/Xrays and details which were not copied into this note but were reviewed prior to creation of Plan. LABS:  I reviewed today's most current labs and imaging studies.   Pertinent labs include:  Recent Labs     08/27/22  0459 08/26/22  1317   WBC 5.2 4.3   HGB 9.4* 9.9*   HCT 29.4* 30.2*   * 152 Recent Labs     08/27/22  0459 08/26/22  1317 08/25/22  1034    144 146*   K 3.7 3.8 3.9   * 113* 111*   CO2 24 24 23   * 218* 184*   BUN 43* 42* 35*   CREA 3.92* 3.81* 3.28*   CA 8.6 8.6 9.0   PHOS 2.9 2.7  --    ALB 2.5* 2.7*  --    TBILI 0.6  --   --    ALT 12  --   --          Signed: Ese Mckinnon MD

## 2022-08-27 NOTE — PROGRESS NOTES
Problem: Dysphagia (Adult)  Goal: *Acute Goals and Plan of Care (Insert Text)  Description: 8/24/2022  Speech path goals  1. Patient will participate with reeval of swallowing ; Outcome: Progressing Towards Goal   SPEECH LANGUAGE PATHOLOGY DYSPHAGIA TREATMENT  Patient: Hiren Scott (84 y.o. male)  Date: 8/27/2022  Diagnosis: CVA (cerebral vascular accident) [facility]) [I63.9]  Acute encephalopathy [G93.40]  VINICIO (acute kidney injury) ([facility] [address] [N17.9]  Hypertensive urgency [I16.0] <principal problem not specified>      Precautions: aspiration Fall, Bed Alarm    ASSESSMENT:  Patient alert. He occasionally nodded yes/no to answer personal ?'s. Nonverbal .Poor command following. He is NOT managing secretions. Had to sx oral-pharyngeal cavities. He had poor oral opening for PO and unable to propel bolus a-p. NOT ready for any PO. PLAN:  Recommendations and Planned Interventions:  NPO, including meds. Patient continues to benefit from skilled intervention to address the above impairments. Continue treatment per established plan of care. Discharge Recommendations: To Be Determined     SUBJECTIVE:   Patient alert. Gave eye contact. OBJECTIVE:   Cognitive and Communication Status:  Neurologic State:  (he was awake. gave eye contact. nodded his head y/n to personal ?'s 30% of the time)  Orientation Level: Unable to verbalize  Cognition: Follows commands (pt can answer by nodding/shaking head)  Perception: Cues to attend to right side of body  Perseveration: No perseveration noted  Safety/Judgement: Lack of insight into deficits  Dysphagia Treatment:  Oral Assessment:  Oral Assessment  Labial:  (masked facies)  Dentition: Natural;Limited (appeared to have mostly lower teeth, but evaluatio limited by reduced oral opening)  Oral Hygiene: juicy.  copious clear secretions bilaterally in mouth and posterior oral cavity and most likely in pharynx  Lingual:  (?)  Velum: Unable to visualize  Mandible: Restricted (reduced oral opening. he appeared to have bite reflex on spoon and suction. did not appear volitional. sometimes he could slightly open the mouth)  P.O. Trials:  Patient Position: upright in bed  Vocal quality prior to P.O.: Aphonic  Consistency Presented: Marisol  How Presented: SLP-fed/presented;Spoon      ORAL PHASE:   Reduced oral opening for spoon with max cues. ? Bite reflux with pressure on spoon causing more closure. Eventually passed a little beyond lower teeth  Moderate frontal leakage vs expectoration. Oral sx out minimal    PHARYNGEAL PHASE:   He had congestion with secretions prior to evaluation. SLP attached oral sx to wall and sx out copious thin secretions from oral cavity and pharynx. He had gag with sx, but not cough response. Wet voice noted on occasional spontaneous throat clear. No cough on t/c on command. No spontaneous swallows or swallows after sx/PO. He attempted sponteneous swallows with lingual pumping, but swallow never occurred. Exercises:  Laryngeal Exercises:                                                                                                                                   Pain:             After treatment:   Patient left in no apparent distress in bed and Nursing notified    COMMUNICATION/EDUCATION:   Patient was educated regarding his deficit(s) of DYSPHAGIA, APHASIA/APRAXIA as this relates to his diagnosis of CVA. He demonstrated Guarded understanding as evidenced by lack of response. The patient's plan of care including recommendations, planned interventions, and recommended diet changes were discussed with: Registered nurse.      CHRISTIAN Jefferson  Time Calculation: 15 mins

## 2022-08-28 NOTE — PROGRESS NOTES
Report given to [place], RN. Pt's condition, plan of care and medications reviewed at bedside with all questions answered at this time.

## 2022-08-28 NOTE — PROGRESS NOTES
Hospitalist Progress Note    NAME: Linda Hardwick   :  1950   MRN:  [id number]       Assessment / Plan:    Acute metabolic encephalopathy POA  Acute left thalamic CVA POA  HTN emergency /111  History of CVA with no residual weakness as per family POA  - Bed bound x 2 years, off medications, recently moved from [place]   Bed bound due to sciatica  - Brought in with inability to talk, lethargy  - pCXR with no ASD  - VBG 7.30, pCO2 54  - Admit Head CT IMPRESSION    Chronic white matter disease and areas of remote infarction. No acute process by CT  - CTA head and neck IMPRESSION  CTA Head:  1. No evidence of flow-limiting stenosis or aneurysm. Multifocal moderate stenoses in the left P2 segment. CTA Neck:  1. No evidence of significant stenosis. - MRI brain IMPRESSION  Chronic infarction left cerebellum. Moderate chronic microvascular ischemic   disease in the supratentorial brain with small areas of chronic bilateral corona   radiata infarction. Small acute infarction ventral left thalamus. 1. Small acute infarction ventral left thalamus. 2. Chronic microvascular ischemic disease and areas of chronic infarction  - Echo LVEF 55 to 60%, normal wall motion, limited study  - EEG negative for seizure  - Continue aspirin, Plavix, statin  - AMS worsening  with increased lethargy       CT head negative IMPRESSION  1. No evidence of acute intracranial abnormality. 2. Chronic microangiopathic white matter disease.  - Started on Keppra at admit, EEG was negative,          Keppra stopped as he also has significant kidney injury, ? Contributing to lethargy  - Blood pressure improved since admission, continue amlodipine.     Add metoprolol IV scheduled with holding parameters  -  work up for persistent lethargy   ABG 7.44, pCO2 33, pO2 75   Na 144   NH4 10   Repeat head CT with no acute change   Procalcitonin 18.9 on ceftriaxone   UA 0-4 WBC, > 100 RBC, negative bacteria  - IV thiamine  - CT chest/abdomen/pelvis 8/27/2022 IMPRESSION  1. Dependent groundglass and consolidative airspace disease in the lower lungs  most suggestive of multifocal pneumonia. 2.  No acute abdominal or pelvic findings. - Broadened antibiotics to vanco, cefepime, flagyl    ? Sepsis with elevated procalcitonin 18.9  Possible HCAP pneumonia   - One low grade fever 100.3 since admit  - No leukocytosis, had 30% bands on labs yesterday, resolved  - Admit UA with 5 to 10 WBC, 5 to 10 RBC, 1+ bacteria   No urine culture sent  - repeat UA yesterday with 0-4 WBC  - Has been on ceftriaxone for a \"UTI\" since admit  - pt cannot verbalize complaints  - Check stat blood culture and lactate  - CT scans chest/abdomen/pelvis IMPRESSION  1. Dependent groundglass and consolidative airspace disease in the lower lungs  most suggestive of multifocal pneumonia. 2.  No acute abdominal or pelvic findings.   - Broaden antibiotics to vanco and cefepime and flagyl    Probable stage 4 chronic kidney disease POA  Hypernatremia Na 146 POA  - Creatinine around 3 to 3.2  - No previous baseline, suspect he is at baseline  - Renal ultrasound without hydronephrosis  - IVF without improvement  - Appreciate Dr Siva Craft input     Code Status: Full code  Surrogate Decision Maker: Patient's wife      DVT Prophylaxis: Heparin  GI Prophylaxis: not indicated     Baseline: Bedridden for almost 2 years from sciatica otherwise alert oriented x4 as per patient's son at bedside    Subjective:     Chief Complaint / Reason for Physician Visit  Lethargic, Opened eyes, but not verbal for me  Able to say yes and no to NS earlier  Seen with wife at bedside    Review of Systems:  Symptom Y/N Comments  Symptom Y/N Comments   Fever/Chills    Chest Pain     Poor Appetite    Edema     Cough    Abdominal Pain     Sputum    Joint Pain     SOB/HO    Pruritis/Rash     Nausea/vomit    Tolerating PT/OT     Diarrhea    Tolerating Diet     Constipation    Other       Could NOT obtain due to: AMS, non verbal     Objective:     VITALS:   Last 24hrs VS reviewed since prior progress note. Most recent are:  Patient Vitals for the past 24 hrs:   Temp Pulse Resp BP SpO2   08/28/22 1402 -- 85 -- (!) 141/77 --   08/28/22 1200 -- 88 -- (!) 157/75 --   08/28/22 1100 97.8 °F (36.6 °C) 81 18 (!) 163/85 99 %   08/28/22 1007 -- 92 -- (!) 156/84 --   08/28/22 0700 98.7 °F (37.1 °C) 85 16 (!) 158/83 97 %   08/28/22 0553 -- 85 -- (!) 149/80 --   08/28/22 0007 -- 80 -- (!) 150/78 --   08/27/22 2305 98.1 °F (36.7 °C) 82 18 (!) 140/77 98 %   08/27/22 2202 -- 93 -- (!) 156/83 --   08/27/22 1817 -- -- -- (!) 155/85 --       No intake or output data in the 24 hours ending 08/28/22 1549       I had a face to face encounter and independently examined this patient on 8/28/2022, as outlined below:    PHYSICAL EXAM:  General: WD, WN. Alert, cooperative, no acute distress    EENT:   Anicteric sclerae. MMM  Resp:  Rhonchi bilaterally, no wheezing or rales. No accessory muscle use  CV:  Regular  rhythm,  No edema  GI:  Soft, Non distended, No clear tenderness. +Bowel sounds  Neurologic:  Lethargic, not talking or following commands for me  Psych:   lethargic  Skin:  No rashes. No jaundice    Reviewed most current lab test results and cultures  YES  Reviewed most current radiology test results   YES  Review and summation of old records today    NO  Reviewed patient's current orders and MAR    YES  PMH/SH reviewed - no change compared to H&P  ________________________________________________________________________  Care Plan discussed with:    Comments   Patient x    Family  x    RN x    Care Manager     Consultant                        Multidiciplinary team rounds were held today with , nursing, pharmacist and clinical coordinator. Patient's plan of care was discussed; medications were reviewed and discharge planning was addressed.      ________________________________________________________________________ Comments   >50% of visit spent in counseling and coordination of care     ________________________________________________________________________  Tono Villanueva MD     Procedures: see electronic medical records for all procedures/Xrays and details which were not copied into this note but were reviewed prior to creation of Plan. LABS:  I reviewed today's most current labs and imaging studies.   Pertinent labs include:  Recent Labs     08/27/22  0459 08/26/22  1317   WBC 5.2 4.3   HGB 9.4* 9.9*   HCT 29.4* 30.2*   * 152       Recent Labs     08/28/22  0142 08/27/22  0459 08/26/22  1317   * 144 144   K 3.8 3.7 3.8   * 114* 113*   CO2 23 24 24   * 152* 218*   BUN 50* 43* 42*   CREA 4.33* 3.92* 3.81*   CA 8.6 8.6 8.6   PHOS 3.1 2.9 2.7   ALB 2.6* 2.5* 2.7*   TBILI  --  0.6  --    ALT  --  12  --          Signed: Tono Villanueva MD

## 2022-08-28 NOTE — PROGRESS NOTES
Nephrology Progress Note  Formerly [facility] / [address] , [place]a Gonzalez, [address]  Phone - [phone]  Fax - [phone]                 Patient: Niko Rose                   YOB: 1950        Date- 8/28/2022                      Admit Date: 8/23/2022  CC: Follow up for VINICIO         IMPRESSION & PLAN:   VINICIO due to dehydration and hypertensive nephropathy-- vasculitis or GN needs to be ruled out  Ckd due to hypertensive nephrosclerosis  Proteinuria  Altered mental status  H/o cva  Hypernatremia  Non compliance to bp meds-- as per Ex WIFE - he moved from [facility] in nove 2021. He has been taking any bp meds or not seen any physician    PLAN-  Start on D5W at 75 ml/hour  Daily BMP  follow serology  Continue iv bp meds  Antibiotics per IM     Subjective: Interval History:   -Cr Worse  -Getting hypernatremic      Objective:   Vitals:    08/28/22 0700 08/28/22 1007 08/28/22 1100 08/28/22 1200   BP: (!) 158/83 (!) 156/84 (!) 163/85 (!) 157/75   Pulse: 85 92 81 88   Resp: 16  18    Temp: 98.7 °F (37.1 °C)  97.8 °F (36.6 °C)    SpO2: 97%  99%    Weight:       Height:          No intake/output data recorded. Last 3 Recorded Weights in this Encounter    08/24/22 1618   Weight: 73 kg (160 lb 15 oz)      Physical exam:    GEN:  NAD  NECK:  Supple, no thyromegaly  RESP: Clear  b/l, no  wheezing,   CVS: RRR,S1,S2   NEURO: Can't assess due to patient's current condition     EXT: no Edema +nt         Chart reviewed. Pertinent Notes reviewed.      Data Review :  Recent Labs     08/28/22  0142 08/27/22  0459 08/26/22  1317   * 144 144   K 3.8 3.7 3.8   * 114* 113*   CO2 23 24 24   BUN 50* 43* 42*   CREA 4.33* 3.92* 3.81*   * 152* 218*   CA 8.6 8.6 8.6   PHOS 3.1 2.9 2.7       Recent Labs     08/27/22  0459 08/26/22  1317   WBC 5.2 4.3   HGB 9.4* 9.9*   HCT 29.4* 30.2*   * 152       No results for input(s): FE, TIBC, PSAT, FERR in the last 72 hours.    Medication list  reviewed  Current Facility-Administered Medications   Medication Dose Route Frequency    cefepime (MAXIPIME) 1 g in 0.9% sodium chloride (MBP/ADV) 50 mL MBP  1 g IntraVENous Q12H    [START ON 8/29/2022] vancomycin (VANCOCIN) 1,000 mg in 0.9% sodium chloride 250 mL ([id number])  1,000 mg IntraVENous Q48H    metroNIDAZOLE (FLAGYL) IVPB premix 500 mg  500 mg IntraVENous Q12H    thiamine (B-1) 200 mg in 0.9% sodium chloride 50 mL IVPB  200 mg IntraVENous DAILY    labetaloL (NORMODYNE;TRANDATE) injection 10 mg  10 mg IntraVENous QID    hydrALAZINE (APRESOLINE) 20 mg/mL injection 20 mg  20 mg IntraVENous Q6H PRN    hydrALAZINE (APRESOLINE) 20 mg/mL injection 10 mg  10 mg IntraVENous Q6H    amLODIPine (NORVASC) tablet 10 mg  10 mg Oral DAILY    clopidogreL (PLAVIX) tablet 75 mg  75 mg Oral DAILY    labetaloL (NORMODYNE;TRANDATE) injection 10 mg  10 mg IntraVENous Q2H PRN    acetaminophen (TYLENOL) tablet 650 mg  650 mg Oral Q4H PRN    Or    acetaminophen (TYLENOL) solution 650 mg  650 mg Per NG tube Q4H PRN    Or    acetaminophen (TYLENOL) suppository 650 mg  650 mg Rectal Q4H PRN    aspirin chewable tablet 81 mg  81 mg Oral DAILY    atorvastatin (LIPITOR) tablet 40 mg  40 mg Oral QHS    heparin (porcine) injection 5,000 Units  5,000 Units SubCUTAneous Keshia Colon MD  8/28/2022

## 2022-08-28 NOTE — PROGRESS NOTES
Report given to CECILE Jenkins. Pt's treatment plan, medications, family concerns all addressed at bedside. All questions answered at this time.

## 2022-08-28 NOTE — ROUTINE PROCESS
..  End of Shift Note    Bedside shift change report given to Yadiel Leos RN  (oncoming nurse) by Pedro Luis Couch RN (offgoing nurse). Report included the following information SBAR, Kardex, Intake/Output, MAR, and Recent Results    Shift worked:  7p - 7a   Shift summary and any significant changes:    None     Concerns for physician to address: None   Zone phone for oncoming shift:  [phone]     Patient Information  Jessica Cueto  71 y.o.  8/23/2022 11:14 AM by Tucker Beavers MD. Jessica Cueto was admitted from Home    Problem List  Patient Active Problem List    Diagnosis Date Noted    CVA (cerebral vascular accident) ([address] 08/23/2022    VINICIO (acute kidney injury) ([address] 08/23/2022    Hypertensive urgency 08/23/2022    Acute encephalopathy 08/23/2022    Bilateral carotid artery stenosis 08/23/2022    Thrombotic stroke involving left middle cerebral artery ([address] 08/23/2022    Aphasia due to acute cerebrovascular accident (CVA) ([address] 08/23/2022    Acute alteration in mental status 08/23/2022    Convulsions ([address] 08/23/2022    History of stroke 08/23/2022    Prostate cancer ([address] 08/23/2022     Past Medical History:   Diagnosis Date    History of stroke     Prostate CA ([address]        Core Measures:  CVA: No No  CHF:No No  PNA:No No    Activity:  Activity Level: Bed Rest  Number times ambulated in hallways past shift: 0  Number of times OOB to chair past shift: 0    Cardiac:   Cardiac Monitoring: No      Cardiac Rhythm: Sinus Tachy    Access:   Current line(s): PIV   Central Line? No   PICC LINE? No     Genitourinary:   Urinary status: incontinent and external catheter  Urinary Catheter?  No     Respiratory:   O2 Device: Nasal cannula  Chronic home O2 use?: NO  Incentive spirometer at bedside: NO       GI:  Last Bowel Movement Date:  (PTA)  Current diet:  DIET NPO  Passing flatus: YES  Tolerating current diet: NO       Pain Management:   Patient states pain is manageable on current regimen: N/A    Skin:  Shilo Score: 15  Interventions: PT/OT consult, limit briefs, internal/external urinary devices, and nutritional support     Patient Safety:  Fall Score:  Total Score: 3  Interventions: bed/chair alarm  High Fall Risk: Yes  @Rollbelt  @dexterity to release roll belt  Yes/No ( must document dexterity  here by stating Yes or No here, otherwise this is a restraint and must follow restraint documentation policy.)    DVT prophylaxis:  DVT prophylaxis Med- Yes  DVT prophylaxis SCD or CORRINE- No     Wounds: (If Applicable)  Wounds- No  Location     Active Consults:  IP CONSULT TO HOSPITALIST  IP CONSULT TO NEUROLOGY  IP CONSULT TO NEPHROLOGY  IP CONSULT TO PALLIATIVE CARE - PROVIDER    Length of Stay:  Expected LOS: 2d 21h  Actual LOS: 5  Discharge Plan: lyla Mack RN

## 2022-08-29 NOTE — PROGRESS NOTES
Attempted to see pt for PT tx. Upon entering the room pt with eye gaze elevated and pt with difficulty bring focus to midline. No command following note and pt with significant clonus in BLEs when testing. Session aborted. Pt not appropriate for PT at this time. Noted palliative consult which is very appropriate.

## 2022-08-29 NOTE — ROUTINE PROCESS
..  End of Shift Note    Bedside shift change report given to Nicolasa Rouse RN  (oncoming nurse) by Gadiel Briscoe RN (offgoing nurse). Report included the following information SBAR, Kardex, Intake/Output, MAR, and Recent Results    Shift worked:  7p - 7a   Shift summary and any significant changes:    High Blood Pressure     Concerns for physician to address: High Blood Pressure   Zone phone for oncoming shift:  [phone]     Patient Information  Kale Kelley  71 y.o.  8/23/2022 11:14 AM by Jason Sumner MD. Kale Kelley was admitted from Home    Problem List  Patient Active Problem List    Diagnosis Date Noted    CVA (cerebral vascular accident) ([address] 08/23/2022    VINICIO (acute kidney injury) ([address] 08/23/2022    Hypertensive urgency 08/23/2022    Acute encephalopathy 08/23/2022    Bilateral carotid artery stenosis 08/23/2022    Thrombotic stroke involving left middle cerebral artery ([address] 08/23/2022    Aphasia due to acute cerebrovascular accident (CVA) ([address] 08/23/2022    Acute alteration in mental status 08/23/2022    Convulsions ([address] 08/23/2022    History of stroke 08/23/2022    Prostate cancer ([address] 08/23/2022     Past Medical History:   Diagnosis Date    History of stroke     Prostate CA ([address]        Core Measures:  CVA: No No  CHF:No No  PNA:No No    Activity:  Activity Level: Bed Rest  Number times ambulated in hallways past shift: 0  Number of times OOB to chair past shift: 0    Cardiac:   Cardiac Monitoring: No      Cardiac Rhythm: Sinus Tachy    Access:   Current line(s): PIV   Central Line? No   PICC LINE? No     Genitourinary:   Urinary status: incontinent and external catheter  Urinary Catheter?  No     Respiratory:   O2 Device: Nasal cannula  Chronic home O2 use?: NO  Incentive spirometer at bedside: NO       GI:  Last Bowel Movement Date:  (PTA)  Current diet:  DIET NPO Sips of Water with Meds  Passing flatus: YES  Tolerating current diet: NO       Pain Management:   Patient states pain is manageable on current regimen: N/A    Skin:  Shilo Score: 15  Interventions: PT/OT consult, limit briefs, internal/external urinary devices, and nutritional support     Patient Safety:  Fall Score:  Total Score: 3  Interventions: bed/chair alarm  High Fall Risk: Yes  @Rollbelt  @dexterity to release roll belt  Yes/No ( must document dexterity  here by stating Yes or No here, otherwise this is a restraint and must follow restraint documentation policy.)    DVT prophylaxis:  DVT prophylaxis Med- Yes  DVT prophylaxis SCD or CORRINE- No     Wounds: (If Applicable)  Wounds- No  Location     Active Consults:  IP CONSULT TO HOSPITALIST  IP CONSULT TO NEUROLOGY  IP CONSULT TO NEPHROLOGY  IP CONSULT TO PALLIATIVE CARE - PROVIDER    Length of Stay:  Expected LOS: 2d 21h  Actual LOS: 6  Discharge Plan: lyla Briscoe RN

## 2022-08-29 NOTE — PROGRESS NOTES
Pharmacy Antimicrobial Kinetic Dosing    Indication for Antimicrobials: Sepsis     Current Regimen of Each Antimicrobial:  Vancomycin- Pharmacy to Dose (Start Date ; Day 3)  Cefepime 1g IV Q12H (Start Date 22; Day 3)  Metronidazole 500 mg IV q12h (start date ; day 3)     Previous Antimicrobial Therapy:      Goal Level: AUC: 400-600 mg/hr/Liter/day    Date Dose & Interval Measured (mcg/mL) Predicted AUC/JUANCHO    1750 mg x1  16.2     -- 14.3            Date & time of next level: Vanc Tr , 1330    Dosing calculator used: Vend-a-Bar calculator    Significant Positive Cultures:    blood: NG. pending    Conditions for Dosing Consideration: None    Labs:  Recent Labs     22  0459   CREA 4.73* 4.33* 3.92*   BUN 59* 50* 43*   PCT  --   --  18.90     Recent Labs     22  0459   WBC 6.4 5.2   BANDS  --  3     Temp (24hrs), Av.7 °F (37.1 °C), Min:98.4 °F (36.9 °C), Max:99.1 °F (37.3 °C)        Creatinine Clearance (mL/min):   CrCl (Ideal Body Weight): 15.7   If actual weight < IBW: CrCl (Actual Body Weight) 14.8    Impression/Plan:   Patient is not a dialysis candidate/Nephrology  SCr is trending up slowly. Will attempt Vancomycin 750 mg q48h for an expected AUC of 454   Continue Cefepime 1 gm IV q12h and Metronidazole 500 mg IV q12h  BMP daily and recheck Vanc Tr on   Antimicrobial stop date TBD     Pharmacy will follow daily and adjust medications as appropriate for renal function and/or serum levels.     Thank you,  Maurice Abreu, PHARMD

## 2022-08-29 NOTE — PROGRESS NOTES
18 gauge Nasogastric tube placed in R nostril. Xray taken to ensure placement. Patient tolerated well.

## 2022-08-29 NOTE — PROGRESS NOTES
Occupational Therapy note:    Chart reviewed and attempted to see patient for OT tx session. Patient received in bed with eyes rolled back but did attempt to focus on therapists when asked. Patient unable to follow simple commands or maintain alertness for tx session. Session aborted. Noted palliative meeting scheduled for tomorrow to discussed goals of care.       Pedro Rojas, OTR/L

## 2022-08-29 NOTE — PROGRESS NOTES
Comprehensive Nutrition Assessment    Type and Reason for Visit: Reassess, Consult    Nutrition Recommendations/Plan:   Jevity 1.5 @ 25 mL/hr and advance as tolerated by 10 mL q 8 hours to goal rate of 55 mL/hr + 150 mL water flushes q 4  hours(1980 kcals, 84g protein, 1903 mL water)     Nutrition Assessment:    Chart reviewed; medically noted for CVA, HTN, and VINICIO. Consult received for TF orders and management; orders for NGT insertion noted. Patient not alert at time of visit and no family at bedside. Patient has been NPO x 6 days. Unable to determine prior weight history. TF recommendations provided above. Will monitor tolerance and provide further recommendations as needed. Monitor lytes daily and replete as needed. Malnutrition Assessment:  Malnutrition Status: Moderate malnutrition (08/29/22 1139)    Context:  Acute illness     Findings of the 6 clinical characteristics of malnutrition:   Energy Intake:  50% or less of est energy requirements for 5 or more days  Weight Loss:  Unable to assess     Body Fat Loss:  Mild body fat loss (brief visual assessment)  Muscle Mass Loss:  Unable to assess,    Fluid Accumulation:  No significant fluid accumulation,     Strength:  Not performed     Nutrition Related Findings:    Na 148, [phone]  Norvasc, Atorvastatin, Plavix, Hydralazine, Labetalol, Thiamine, D5% IVF    Current Nutrition Intake & Therapies:  Average Meal Intake: NPO     DIET NPO Sips of Water with Meds    Anthropometric Measures:  Height: 6' (182.9 cm)  Ideal Body Weight (IBW): 178 lbs (81 kg)     Current Body Wt:  73 kg (160 lb 15 oz), 90.4 % IBW. Current BMI (kg/m2): 21.8                          BMI Category: Normal weight (BMI 18.5-24. 9)    Estimated Daily Nutrient Needs:  Energy Requirements Based On: Formula  Weight Used for Energy Requirements: Current  Energy (kcal/day): 3248-5902 kcals (BMR 1523 x 1.2-1. 3AF)  Weight Used for Protein Requirements: Current  Protein (g/day): 73-88g (1.0-1.2 g/kg bw)  Method Used for Fluid Requirements: 1 ml/kcal  Fluid (ml/day): 2000 mL    Nutrition Diagnosis:   Inadequate protein-energy intake related to cognitive or neurological impairment, swallowing difficulty as evidenced by NPO or clear liquid status due to medical condition    Nutrition Interventions:   Food and/or Nutrient Delivery: Start tube feeding  Nutrition Education/Counseling: No recommendations at this time  Coordination of Nutrition Care: Continue to monitor while inpatient       Goals:  Previous Goal Met: No progress toward goal(s)  Goals: Initiate nutrition support, Tolerate nutrition support at goal rate, by next RD assessment       Nutrition Monitoring and Evaluation:   Behavioral-Environmental Outcomes: None identified  Food/Nutrient Intake Outcomes: Enteral nutrition intake/tolerance  Physical Signs/Symptoms Outcomes: Biochemical data, Chewing or swallowing, Weight, Skin, Fluid status or edema, Hemodynamic status    Discharge Planning:     Too soon to determine    Cecilia Valera RD  Contact: ext [phone]

## 2022-08-29 NOTE — PROGRESS NOTES
Speech Pathology Note    Chart reviewed and spoke with RN. Attempted to see patient for SLP treatment. Patient asleep, audibly snoring with eyes rolled back upon SLP arrival. Patient would not arouse despite maximal cues (i.e. verbal/tactile cues, lights on, ice chip to lip, etc.). Patient not appropriate for PO trials on this date. Note palliative meeting scheduled for tomorrow to discussed goals of care. Will defer on this date and will follow up as patient is appropriate. Thank you.     Katie Scott M.S., CCC-SLP

## 2022-08-29 NOTE — PROGRESS NOTES
MRI Pending:    Need completed online [facility] MRI screening form. Sign and fax to [phone]. Call [phone] once faxed.

## 2022-08-29 NOTE — PROGRESS NOTES
Nephrology Progress Note  [facility] / [address], [place], [address]  Phone - [phone]  Fax - [phone]                 Patient: Wilber Cano                   YOB: 1950        Date- 8/29/2022                      Admit Date: 8/23/2022  CC: Follow up for vinicio       IMPRESSION & PLAN:   VINICIO due to dehydration and hypertensive nephropathy-- vasculitis or GN needs to be ruled out-  laila, hepatitis panel negative  Ckd due to hypertensive nephrosclerosis  Proteinuria  Altered mental status  H/o cva  Hypernatremia  Non compliance to bp meds-- as per Ex WIFE - he moved from [facility] in [place] 2021. He has been taking any bp meds or not seen any physician    PLAN-  Start D5W at 75 ml/hour  Daily BMP  follow serology  Continue iv bp meds  He is not a dialysis candidate with ongoing neuro status     Subjective: Interval History:   -Cr Worse  Na high  HE IS NOT GETTING ANY IVF AS ordered yesterday  He is non verbal  Thompson removed      Objective:   Vitals:    08/28/22 2302 08/29/22 0253 08/29/22 0510 08/29/22 0908   BP: (!) 180/87 (!) 182/86 (!) 140/86 (!) 143/84   Pulse: 77 85 73 76   Resp:  20  20   Temp:  98.9 °F (37.2 °C)  98.5 °F (36.9 °C)   SpO2:  99%  98%   Weight:       Height:          No intake/output data recorded. Last 3 Recorded Weights in this Encounter    08/24/22 1618   Weight: 73 kg (160 lb 15 oz)      Physical exam:    GEN:  NAD  NECK:  Supple, no thyromegaly  RESP: Clear  b/l, no  wheezing,   CVS: RRR,S1,S2   NEURO: Can't assess due to patient's current condition   EXT: no Edema +nt            Chart reviewed. Pertinent Notes reviewed.      Data Review :  Recent Labs     08/29/22  0145 08/28/22  0142 08/27/22  0459 08/26/22  1317   * 148* 144 144   K 3.9 3.8 3.7 3.8   * 119* 114* 113*   CO2 22 23 24 24   BUN 59* 50* 43* 42*   CREA 4.73* 4.33* 3.92* 3.81*   * 123* 152* 218*   CA 8.6 8.6 8.6 8.6   PHOS  --  3.1 2.9 2.7       Recent Labs     08/29/22  0145 08/27/22  0459 08/26/22  1317   WBC 6.4 5.2 4.3   HGB 8.7* 9.4* 9.9*   HCT 28.0* 29.4* 30.2*    149* 152       No results for input(s): FE, TIBC, PSAT, FERR in the last 72 hours.    Medication list  reviewed  Current Facility-Administered Medications   Medication Dose Route Frequency    dextrose 5% infusion  75 mL/hr IntraVENous CONTINUOUS    cefepime (MAXIPIME) 1 g in 0.9% sodium chloride (MBP/ADV) 50 mL MBP  1 g IntraVENous Q12H    vancomycin (VANCOCIN) 1,000 mg in 0.9% sodium chloride 250 mL ([id number])  1,000 mg IntraVENous Q48H    metroNIDAZOLE (FLAGYL) IVPB premix 500 mg  500 mg IntraVENous Q12H    thiamine (B-1) 200 mg in 0.9% sodium chloride 50 mL IVPB  200 mg IntraVENous DAILY    labetaloL (NORMODYNE;TRANDATE) injection 10 mg  10 mg IntraVENous QID    hydrALAZINE (APRESOLINE) 20 mg/mL injection 20 mg  20 mg IntraVENous Q6H PRN    hydrALAZINE (APRESOLINE) 20 mg/mL injection 10 mg  10 mg IntraVENous Q6H    amLODIPine (NORVASC) tablet 10 mg  10 mg Oral DAILY    clopidogreL (PLAVIX) tablet 75 mg  75 mg Oral DAILY    labetaloL (NORMODYNE;TRANDATE) injection 10 mg  10 mg IntraVENous Q2H PRN    acetaminophen (TYLENOL) tablet 650 mg  650 mg Oral Q4H PRN    Or    acetaminophen (TYLENOL) solution 650 mg  650 mg Per NG tube Q4H PRN    Or    acetaminophen (TYLENOL) suppository 650 mg  650 mg Rectal Q4H PRN    aspirin chewable tablet 81 mg  81 mg Oral DAILY    atorvastatin (LIPITOR) tablet 40 mg  40 mg Oral QHS    heparin (porcine) injection 5,000 Units  5,000 Units SubCUTAneous Q8H          Pj Gregory MD  8/29/2022

## 2022-08-29 NOTE — PROGRESS NOTES
Hospitalist Progress Note    NAME: Charity Lezama   :  1950   MRN:  [id number]       Assessment / Plan:    Acute metabolic encephalopathy POA  Acute left thalamic CVA POA  HTN emergency /111  History of CVA with no residual weakness as per family, POA  - Bed bound x 2 years, off medications, recently moved from [facility]   Bed bound due to sciatica  - Brought in with inability to talk, lethargy  - pCXR with no ASD  - VBG 7.30, pCO2 54  -Admit Head CT IMPRESSION    Chronic white matter disease and areas of remote infarction. No acute process by CT  - CTA head and neck IMPRESSION  CTA Head:  1. No evidence of flow-limiting stenosis or aneurysm. Multifocal moderate stenoses in the left P2 segment. CTA Neck:  1. No evidence of significant stenosis. - MRI brain IMPRESSION  Chronic infarction left cerebellum. Moderate chronic microvascular ischemic   disease in the supratentorial brain with small areas of chronic bilateral corona   radiata infarction. Small acute infarction ventral left thalamus. 1. Small acute infarction ventral left thalamus. 2. Chronic microvascular ischemic disease and areas of chronic infarction  - Echo LVEF 55 to 60%, normal wall motion, limited study  - EEG negative for seizure  - Continue aspirin, Plavix, statin  - AMS worsening  with ioncreased lethargy       CT head negative IMPRESSION  1. No evidence of acute intracranial abnormality. 2. Chronic microangiopathic white matter disease.  - Started on Keppra at admit, EEG was negative,          Keppra stopped as he also has significant kidney injury, ? Contributing to lethargy  - Blood pressure improved since admission, continue amlodipine. Add metoprolol IV scheduled with holding parameters    -mentation remains poor, will get MRI brain, repeat EEG and neuro evalv      ?  Sepsis with elevated procalcitonin 18.9  PNA  - One low grade fever 100.3 since admit  - No leukocytosis, had 30% bands on labs yesterday, resolved  - Admit UA with 5 to 10 WBC, 5 to 10 RBC, 1+ bacteria   No urine culture sent  - repeat UA yesterday with 0-4 WBC  - Has been on ceftriaxone for a \"UTI\" since admit  - pt cannot verbalize complaints  Bcx negative  - CT sacn consistent with PNA  - Broaden antibiotics to vanco and cefepime once [facility] sent    Probable stage 4 chronic kidney disease POA  Hypernatremia Na 146 POA  VINICIO worse  - Creatinine around 3 to 3.2  --creat worse today  -continue d5w  -Nephro on board      Place NG tube , consulted dietitian or tube feeding    Code Status: Full code  Surrogate Decision Maker: Patient's wife      DVT Prophylaxis: Heparin  GI Prophylaxis: not indicated     Baseline: Bedridden for almost 2 years from sciatica otherwise alert oriented x4 as per patient's son at bedside    Palliative consulted. Subjective:     Chief Complaint / Reason for Physician Visit  Lethargic, opens eyes and nods yes or No.    Review of Systems:  Symptom Y/N Comments  Symptom Y/N Comments   Fever/Chills    Chest Pain     Poor Appetite    Edema     Cough    Abdominal Pain     Sputum    Joint Pain     SOB/HO    Pruritis/Rash     Nausea/vomit    Tolerating PT/OT     Diarrhea    Tolerating Diet     Constipation    Other       Could NOT obtain due to: AMS, non verbal     Objective:     VITALS:   Last 24hrs VS reviewed since prior progress note.  Most recent are:  Patient Vitals for the past 24 hrs:   Temp Pulse Resp BP SpO2   08/29/22 0908 98.5 °F (36.9 °C) 76 20 (!) 143/84 98 %   08/29/22 0510 -- 73 -- (!) 140/86 --   08/29/22 0253 98.9 °F (37.2 °C) 85 20 (!) 182/86 99 %   08/28/22 2302 -- 77 -- (!) 180/87 --   08/28/22 2154 -- 78 -- (!) 172/88 --   08/28/22 1947 98.4 °F (36.9 °C) 88 18 132/77 99 %   08/28/22 1814 -- 80 16 (!) 162/81 99 %   08/28/22 1500 99.1 °F (37.3 °C) 86 14 (!) 166/83 97 %   08/28/22 1402 -- 85 -- (!) 141/77 --   08/28/22 1200 -- 88 -- (!) 157/75 --       No intake or output data in the 24 hours ending 08/29/22 1141 I had a face to face encounter and independently examined this patient on 8/29/2022, as outlined below:    PHYSICAL EXAM:  General: WD, WN. Alert, cooperative, no acute distress    EENT:   Anicteric sclerae. MMM  Resp:  Rhonchi bilaterally, no wheezing or rales. No accessory muscle use  CV:  Regular  rhythm,  No edema  GI:  Soft, Non distended, No clear tenderness. +Bowel sounds  Neurologic:  Lethargic, not talking or following commands for me  Psych:   lethargic  Skin:  No rashes. No jaundice    Reviewed most current lab test results and cultures  YES  Reviewed most current radiology test results   YES  Review and summation of old records today    NO  Reviewed patient's current orders and MAR    YES  PMH/SH reviewed - no change compared to H&P  ________________________________________________________________________  Care Plan discussed with:    Comments   Patient x    Family  x    RN x    Care Manager     Consultant                        Multidiciplinary team rounds were held today with , nursing, pharmacist and clinical coordinator. Patient's plan of care was discussed; medications were reviewed and discharge planning was addressed. ________________________________________________________________________      Comments   >50% of visit spent in counseling and coordination of care     ________________________________________________________________________  Zeyad Cruz MD     Procedures: see electronic medical records for all procedures/Xrays and details which were not copied into this note but were reviewed prior to creation of Plan. LABS:  I reviewed today's most current labs and imaging studies.   Pertinent labs include:  Recent Labs     08/29/22  0145 08/27/22  0459 08/26/22  1317   WBC 6.4 5.2 4.3   HGB 8.7* 9.4* 9.9*   HCT 28.0* 29.4* 30.2*    149* 152       Recent Labs     08/29/22  0145 08/28/22  0142 08/27/22  0459 08/26/22  1317   * 148* 144 144   K 3.9 3.8 3.7 3.8   * 119* 114* 113*   CO2 22 23 24 24   * 123* 152* 218*   BUN 59* 50* 43* 42*   CREA 4.73* 4.33* 3.92* 3.81*   CA 8.6 8.6 8.6 8.6   PHOS  --  3.1 2.9 2.7   ALB 2.5* 2.6* 2.5* 2.7*   TBILI 0.5  --  0.6  --    ALT 14  --  12  --          Signed: Edgardo Sheppard MD

## 2022-08-29 NOTE — ACP (ADVANCE CARE PLANNING)
Primary Decision Maker: Shad Rosen - [phone]  Advance Care Planning 8/29/2022   Patient's Healthcare Decision Maker is: Legal Next of Kin   Confirm Advance Directive None      Patient does not have an AMD.     Phone call made to patient's wife, Moises Martell, who has been involved with patient and was instrumental in terms of his move back to [place] in 2021. Son noted that she and patient are still , hence she would be his LNOK and his decision maker. (Patient does have 4 children). Palliative team was asked to see patient and family for ES Disease, likely for [address]. Son is unable to come to the hospital today, but we will meet with her tomorrow between 10 and 11 am, when she gets to the hospital. She has our number and will call us when here.

## 2022-08-29 NOTE — PROGRESS NOTES
Occupational Therapy note:    Chart reviewed and attempted to see patient for OT tx session. Patient received EEG at bedside. Will defer and continue to follow.     Thai Mensah OTR/STEPHON

## 2022-08-30 NOTE — PROGRESS NOTES
During shift change, NGT noted to be out of place. Feeding stopped temporarily, xray order put in. New x-ray confirmed tube placement in the stomach. Feeding started back immediately.

## 2022-08-30 NOTE — PROGRESS NOTES
Problem: Pressure Injury - Risk of  Goal: *Prevention of pressure injury  Description: Document Shilo Scale and appropriate interventions in the flowsheet. Outcome: Progressing Towards Goal  Note: Pressure Injury Interventions:  Sensory Interventions: Assess changes in LOC    Moisture Interventions: Absorbent underpads, Apply protective barrier, creams and emollients    Activity Interventions: Increase time out of bed, Pressure redistribution bed/mattress(bed type)    Mobility Interventions: Float heels, Pressure redistribution bed/mattress (bed type)    Nutrition Interventions: Document food/fluid/supplement intake                     Problem: Falls - Risk of  Goal: *Absence of Falls  Description: Document Gian Fall Risk and appropriate interventions in the flowsheet.   Outcome: Progressing Towards Goal  Note: Fall Risk Interventions:       Mentation Interventions: Adequate sleep, hydration, pain control    Medication Interventions: Bed/chair exit alarm, Patient to call before getting OOB, Teach patient to arise slowly    Elimination Interventions: Call light in reach, Bed/chair exit alarm    History of Falls Interventions: Bed/chair exit alarm, Evaluate medications/consider consulting pharmacy

## 2022-08-30 NOTE — PROGRESS NOTES
Speech Pathology Note    Chart reviewed and spoke with RN. Attempted to see patient for SLP dysphagia treatment, however patient continues with decreased alertness. Patient did not arouse despite maximal cues (i.e. sternal rub, HOB upright, maximal verbal and tactile cues, etc.). Will defer SLP visit on this date. Note patient has not been appropriate the last couple of SLP visits and patient with NGT placed 8/29 for primary nutrition/hydration/medication. Per chart review, plans for Palliative meeting on this date.     Diana Leong M.S., CCC-SLP

## 2022-08-30 NOTE — PROGRESS NOTES
End of Shift Note     Bedside shift change report given to Skylar Estrella RN  (oncoming nurse) by Tana Parsons (offgoing nurse). Report included the following information SBAR, Kardex, Intake/Output, MAR, and Recent Results     Shift worked: 7p-7a   Shift summary and any significant changes:     18 Fr. NG Tube inserted to right nostril. Jevity 1.5 at 25ml/hr infusing. Concerns for physician to address: High Blood Pressure   Zone phone for oncoming shift:        Patient Information  Evans Blizzard  71 y.o.  8/23/2022 11:14 AM by Myles Diaz MD. Evans Blizzard was admitted from Home     Problem List          Patient Active Problem List     Diagnosis Date Noted    CVA (cerebral vascular accident) ([address] 08/23/2022    VINICIO (acute kidney injury) ([address] 08/23/2022    Hypertensive urgency 08/23/2022    Acute encephalopathy 08/23/2022    Bilateral carotid artery stenosis 08/23/2022    Thrombotic stroke involving left middle cerebral artery ([address] 08/23/2022    Aphasia due to acute cerebrovascular accident (CVA) ([address] 08/23/2022    Acute alteration in mental status 08/23/2022    Convulsions ([address] 08/23/2022    History of stroke 08/23/2022    Prostate cancer ([address] 08/23/2022              Past Medical History:   Diagnosis Date    History of stroke      Prostate CA ([address]           Core Measures:  CVA: No No  CHF:No No  PNA:No No     Activity:  Activity Level: Bed Rest  Number times ambulated in hallways past shift: 0  Number of times OOB to chair past shift: 0     Cardiac:   Cardiac Monitoring: No      Cardiac Rhythm:      Access:   Current line(s): PIV   Central Line? No   PICC LINE? No      Genitourinary:   Urinary status: incontinent   Urinary Catheter?  No      Respiratory:   O2 Device: Nasal cannula  Chronic home O2 use?: NO  Incentive spirometer at bedside: NO     GI:  Last Bowel Movement Date:  (PTA)  Current diet:  DIET NPO Sips of Water with Meds  Passing flatus: YES  Tolerating current diet: NO     Pain Management: Patient states pain is manageable on current regimen: N/A     Skin:  Shilo Score: 15  Interventions: PT/OT consult, limit briefs, internal/external urinary devices, and nutritional support     Patient Safety:  Fall Score:  Total Score: 3  Interventions: bed/chair alarm  High Fall Risk: Yes     DVT prophylaxis:  DVT prophylaxis Med- Yes  DVT prophylaxis SCD or CORRINE- No      Wounds: (If Applicable)  Wounds- No  Location      Active Consults:  IP CONSULT TO HOSPITALIST  IP CONSULT TO NEUROLOGY  IP CONSULT TO NEPHROLOGY  IP CONSULT TO PALLIATIVE CARE - PROVIDER     Length of Stay:  Expected LOS: 2d 21h  Actual LOS: 6  Discharge Plan: SNF when medically cleared

## 2022-08-30 NOTE — CONSULTS
Palliative Medicine Consult  Trever: [phone] ([phone])    Patient Name: Dixon Schneider  YOB: 1950    Date of Initial Consult: 8/30/22  Reason for Consult: end stage disease   Requesting Provider: Michelle Avalos MD  Primary Care Physician: None     SUMMARY:   Dixon Schneider is a 71 y.o. male with a past history of CVA with left sided weakness, Sciatica, bed bound for past for past several months, he does not take any medication, does not see a doctor. He was admitted on 8/23/2022 from home presented with acute onset of aphasia , found to have small acute stroke in ventral left thalamus . Hospital course is notable dehydration with VINICIO, hypernatremia, Multilobar pneumonia on I/V on I/V antibiotics. Nephrology and urology following. He is not a candidate for dialysis    Encephalopathy has not improved during the course of admission, he is on NGT feeding, there is concern for extension of stroke, repeat MRI is suggested we are called in for care decisions. Social history:  to wife Karine Morales, estranged from her for several years, recently [place] found out patient was on street in [place] and she brought him back to [place] in Nov 2021, patient is bed bound due to weakness of legs has hospital bed . Patient has four children, one of their  son is local .    Yuki Santizo next of kin   . PALLIATIVE DIAGNOSES:   Encephalopathy (Acute CVA)  Metabolic abnormalities( hypernatremia)  Aphasia. Feeding difficulty due to # 1. Hypoalbuminemia  DNR Discussion  Goals of Care       PLAN:   Goals of Care:  Conversation held with patient's wife/NOK, Jakob Hernandez,  with Gregory Jimenez/Palliative care new provider in orientation, along side Ariella Martinez([facility]), [place] understands patient's severe illness with acute CVA, encephalopathy, and worsening renal function.   It was discussed in detail that despite treatment, his condition has continued to decline and managing teams believe he is not likely for him to regain meaningful function. She states that \"he would not want to live like this ' unable to talk, unable to eat . [facility] would not want to continue pursuing aggressive treatment in this situation and would instead choose to pursue comfort focused care without continued invasive testing and diagnostic work-up. Topic of hospice was brought up and [facility] voices understanding of and familiarity with this service as she has had several other family members receive hospice care at end of life. She would like to pursue hospice services for patient but wants to discuss this with his children before making decision, which will likely be in the next 24 hours. Patient would return home with her, and Bertha's   36year old daughter who lives in the home; she voices that daughter may have difficulty dealing with patient eventually passing away in the home, we discussed support of hospice chaplain and  . Until decision is made on hospice, it was agreed upon to discontinue further testing or interventions that are not focused on patient's comfort: MRI, lab draws, etc.  Will continue tube feeds for now until final hospice decision is made. Patient appears comfortable at this time. .  Code Status; Reviewed code status with patient's surrogate who believes that when patient reaches the end of his natural life he would want to be allowed to pass peacefully at that time without resuscitation attempts. DNR order placed and DDNR completed/signed by Bertha/wife Memo Mcghee. We will follow to continue with goals of care discussion and help transition with hospice when family is ready .   Initial consult routed to  primary continuity provider and/or primary health care team members  Communicated plan of care with: Palliative Anurag DEUTSCH 192 Team     GOALS OF CARE / TREATMENT PREFERENCES:     GOALS OF CARE:  Patient/Health Care Proxy Stated Goals: Comfort    TREATMENT PREFERENCES:   Code Status: DNR    Patient and family's personal goals include: no  further diagnostic intervention    Important upcoming milestones or family events: The patient identifies the following as important for living well: able toe at and talk       Advance Care Planning:  [x] The [place] [id number] Team has updated the ACP Navigator with Health Care Decision Maker and Patient Capacity      Primary Decision Maker: Jadyn Taylor - [phone]  Advance Care Planning 8/30/2022   Patient's Healthcare Decision Maker is: Legal Next of Kin   Confirm Advance Directive None   Patient Would Like to Complete Advance Directive Unable   Does the patient have other document types Do Not Resuscitate       Medical Interventions: Other (comment) (DNR)       Other:    As far as possible, the palliative care team has discussed with patient / health care proxy about goals of care / treatment preferences for patient. HISTORY:     History obtained from: chart , spouse     CHIEF COMPLAINT: unable to tell us     HPI/SUBJECTIVE:    The patient is:   [] Verbal and participatory  [x] Non-participatory due to:    Altered mental status     Clinical Pain Assessment (nonverbal scale for severity on nonverbal patients):   Clinical Pain Assessment  Severity: 0          Duration: for how long has pt been experiencing pain (e.g., 2 days, 1 month, years)  Frequency: how often pain is an issue (e.g., several times per day, once every few days, constant)     FUNCTIONAL ASSESSMENT:     Palliative Performance Scale (PPS):  PPS: 20       PSYCHOSOCIAL/SPIRITUAL SCREENING:     Palliative IDT has assessed this patient for cultural preferences / practices and a referral made as appropriate to needs (Cultural Services, Patient Advocacy, Ethics, etc.)    Any spiritual / Islam concerns:  [] Yes /  [x] No   If \"Yes\" to discuss with pastoral care during IDT     Does caregiver feel burdened by caring for their loved one:   [] Yes /  [x] No /  [] No Caregiver Present/Available [] No Caregiver [] Pt Lives at Facility  If \"Yes\" to discuss with social work during IDT    Anticipatory grief assessment:   [x] Normal  / [] Maladaptive     If \"Maladaptive\" to discuss with social work during IDT    ESAS Anxiety:      ESAS Depression:          REVIEW OF SYSTEMS:     Positive and pertinent negative findings in ROS are noted above in HPI. The following systems were [] reviewed / [x] unable to be reviewed as noted in HPI  Other findings are noted below. Systems: constitutional, ears/nose/mouth/throat, respiratory, gastrointestinal, genitourinary, musculoskeletal, integumentary, neurologic, psychiatric, endocrine. Positive findings noted below. Modified ESAS Completed by: provider           Pain: 0     Nausea: 0                 Stool Occurrence(s): 0        PHYSICAL EXAM:     From RN flowsheet:  Wt Readings from Last 3 Encounters:   08/24/22 160 lb 15 oz (73 kg)     Blood pressure (!) 159/79, pulse 91, temperature 98.2 °F (36.8 °C), resp. rate 18, height 6' (1.829 m), weight 160 lb 15 oz (73 kg), SpO2 97 %. Pain Scale 1: Visual  Pain Intensity 1: 0                 Last bowel movement, if known:     Constitutional: obtunded,  chronically ill non verbal , not in any distress. Eyes: pupils equal, anicteric  ENMT: no nasal discharge, dry oral mucosa. Cardiovascular: regular rhythm,. Respiratory: breathing not labored, symmetric  Gastrointestinal: soft non-tender, +bowel sounds  Musculoskeletal: no deformity, no tenderness to palpation  Skin: warm, dry  Neurologic: not following commands, moving all extremities  Psychiatric: full affect, no hallucinations.   Other:       HISTORY:     Active Problems:    CVA (cerebral vascular accident) ([facility] (8/23/2022)      VINICIO (acute kidney injury) ([facility] (8/23/2022)      Hypertensive urgency (8/23/2022)      Acute encephalopathy (8/23/2022)      Bilateral carotid artery stenosis (8/23/2022)      Thrombotic stroke involving left middle cerebral artery ([facility] (8/23/2022)      Aphasia due to acute cerebrovascular accident (CVA) ([facility] (8/23/2022)      Acute alteration in mental status (8/23/2022)      Convulsions ([facility] (8/23/2022)      History of stroke (8/23/2022)      Prostate cancer ([facility] (8/23/2022)    Past Medical History:   Diagnosis Date    History of stroke     Prostate CA [facility])       History reviewed. No pertinent surgical history. History reviewed. No pertinent family history. History reviewed, no pertinent family history. Social History     Tobacco Use    Smoking status: Never    Smokeless tobacco: Never   Substance Use Topics    Alcohol use: Not on file     No Known Allergies   Current Facility-Administered Medications   Medication Dose Route Frequency    amLODIPine (NORVASC) tablet 10 mg  10 mg Per NG tube DAILY    labetaloL (NORMODYNE) tablet 100 mg  100 mg Per NG tube BID    vancomycin (VANCOCIN) 750 mg in 0.9% sodium chloride 250 mL ([id number])  750 mg IntraVENous Q48H    [START ON 8/31/2022] Vancomycin trough on 8/31 prior to the 1400 dose.  thanks   Other ONCE    cefepime (MAXIPIME) 1 g in 0.9% sodium chloride (MBP/ADV) 50 mL MBP  1 g IntraVENous Q12H    metroNIDAZOLE (FLAGYL) IVPB premix 500 mg  500 mg IntraVENous Q12H    thiamine (B-1) 200 mg in 0.9% sodium chloride 50 mL IVPB  200 mg IntraVENous DAILY    hydrALAZINE (APRESOLINE) 20 mg/mL injection 20 mg  20 mg IntraVENous Q6H PRN    hydrALAZINE (APRESOLINE) 20 mg/mL injection 10 mg  10 mg IntraVENous Q6H    clopidogreL (PLAVIX) tablet 75 mg  75 mg Oral DAILY    labetaloL (NORMODYNE;TRANDATE) injection 10 mg  10 mg IntraVENous Q2H PRN    acetaminophen (TYLENOL) tablet 650 mg  650 mg Oral Q4H PRN    Or    acetaminophen (TYLENOL) solution 650 mg  650 mg Per NG tube Q4H PRN    Or    acetaminophen (TYLENOL) suppository 650 mg  650 mg Rectal Q4H PRN    aspirin chewable tablet 81 mg  81 mg Oral DAILY    atorvastatin (LIPITOR) tablet 40 mg  40 mg Oral QHS    heparin (porcine) injection 5,000 Units  5,000 Units SubCUTAneous Q8H          LAB AND IMAGING FINDINGS:     Lab Results   Component Value Date/Time    WBC 6.4 08/29/2022 01:45 AM    HGB 8.7 (L) 08/29/2022 01:45 AM    PLATELET 336 65/59/3863 01:45 AM     Lab Results   Component Value Date/Time    Sodium 148 (H) 08/29/2022 01:45 AM    Potassium 3.9 08/29/2022 01:45 AM    Chloride 119 (H) 08/29/2022 01:45 AM    CO2 22 08/29/2022 01:45 AM    BUN 59 (H) 08/29/2022 01:45 AM    Creatinine 4.73 (H) 08/29/2022 01:45 AM    Calcium 8.6 08/29/2022 01:45 AM    Phosphorus 3.1 08/28/2022 01:42 AM      Lab Results   Component Value Date/Time    Alk. phosphatase 75 08/29/2022 01:45 AM    Protein, total 6.4 08/29/2022 01:45 AM    Albumin 2.5 (L) 08/29/2022 01:45 AM    Globulin 3.9 08/29/2022 01:45 AM     Lab Results   Component Value Date/Time    INR 1.0 08/23/2022 11:53 AM    Prothrombin time 10.8 08/23/2022 11:53 AM      No results found for: IRON, FE, TIBC, IBCT, PSAT, FERR   No results found for: PH, PCO2, PO2  No components found for: [place]   Lab Results   Component Value Date/Time     08/25/2022 10:34 AM                Total time:   Counseling / coordination time, spent as noted above:   > 50% counseling / coordination?:     Prolonged service was provided for  []30 min   []75 min in face to face time in the presence of the patient, spent as noted above. Time Start:   Time End:   Note: this can only be billed with 27772 (initial) or 85349 (follow up). If multiple start / stop times, list each separately.

## 2022-08-30 NOTE — PROGRESS NOTES
End of Shift Note     Bedside shift change report given to [facility] RN  (oncoming nurse) by Pelon Gonsalez (offgoing nurse). Report included the following information SBAR, Kardex, Intake/Output, MAR, and Recent Results     Shift worked: 7a-7p   Shift summary and any significant changes:     18 Fr. NG Tube inserted to right nostril. Jevity 1.5 at 25ml/hr infusing. Concerns for physician to address: High Blood Pressure   Zone phone for oncoming shift:  [phone]      Patient Information  Norma Martin  71 y.o.  8/23/2022 11:14 AM by Rene Todd MD. Norma Martin was admitted from Home     Problem List       Patient Active Problem List     Diagnosis Date Noted    CVA (cerebral vascular accident) ([address] 08/23/2022    VINICIO (acute kidney injury) ([address] 08/23/2022    Hypertensive urgency 08/23/2022    Acute encephalopathy 08/23/2022    Bilateral carotid artery stenosis 08/23/2022    Thrombotic stroke involving left middle cerebral artery ([address] 08/23/2022    Aphasia due to acute cerebrovascular accident (CVA) ([address] 08/23/2022    Acute alteration in mental status 08/23/2022    Convulsions ([address] 08/23/2022    History of stroke 08/23/2022    Prostate cancer ([address] 08/23/2022           Past Medical History:   Diagnosis Date    History of stroke      Prostate CA ([address]           Core Measures:  CVA: No No  CHF:No No  PNA:No No     Activity:  Activity Level: Bed Rest  Number times ambulated in hallways past shift: 0  Number of times OOB to chair past shift: 0     Cardiac:   Cardiac Monitoring: No      Cardiac Rhythm: Sinus Tachy     Access:   Current line(s): PIV   Central Line? No   PICC LINE? No      Genitourinary:   Urinary status: incontinent and external catheter  Urinary Catheter?  No      Respiratory:   O2 Device: Nasal cannula  Chronic home O2 use?: NO  Incentive spirometer at bedside: NO     GI:  Last Bowel Movement Date:  (PTA)  Current diet:  DIET NPO Sips of Water with Meds  Passing flatus: YES  Tolerating current diet: NO     Pain Management:   Patient states pain is manageable on current regimen: N/A     Skin:  Shilo Score: 15  Interventions: PT/OT consult, limit briefs, internal/external urinary devices, and nutritional support     Patient Safety:  Fall Score:  Total Score: 3  Interventions: bed/chair alarm  High Fall Risk: Yes     DVT prophylaxis:  DVT prophylaxis Med- Yes  DVT prophylaxis SCD or CORRINE- No      Wounds: (If Applicable)  Wounds- No  Location      Active Consults:  IP CONSULT TO HOSPITALIST  IP CONSULT TO NEUROLOGY  IP CONSULT TO NEPHROLOGY  IP CONSULT TO PALLIATIVE CARE - PROVIDER     Length of Stay:  Expected LOS: 2d 21h  Actual LOS: 6  Discharge Plan: SNF when medically cleared

## 2022-08-30 NOTE — PROGRESS NOTES
Occupational Therapy note:    Chart reviewed and attempted to see patient for OT tx session. Patient received sleeping in bed and did not arouse to cold wash cloth, sternal nub, painful stimulus, or noise. Session aborted and RN notified. Patient has not been appropriate for OT tx session the last few attempts. Will sign off at this time. Please re-consult if patient becomes more appropriate.     Ligia Hernandez, OTR/L

## 2022-08-30 NOTE — PROGRESS NOTES
Nephrology Progress Note  [place]       [facility] / [address], [address]  [facility], [address]  Phone - [phone]  Fax - [phone]                 Patient: Jessica Cueto                   YOB: 1950        Date- 8/30/2022                      Admit Date: 8/23/2022  CC: Follow up for VINICIO    IMPRESSION & PLAN:   VINICIO due to dehydration and hypertensive nephropathy-- vasculitis or GN needs to be ruled out-  laila, hepatitis panel negative  Ckd due to hypertensive nephrosclerosis  Proteinuria  Altered mental status  H/o cva  Hypernatremia  Non compliance to bp meds-- as per Ex WIFE - he moved from [facility] in nove 2021. He has been taking any bp meds or not seen any physician    PLAN-  Continue D5W at 75 ml/hour  Daily BMP  follow serology  Palliative care team consulted  He is not a dialysis candidate with ongoing neuro status  D/w family at bedside     Subjective: Interval History:   -no labs done today  He is now on tube feed  He is non verbal.    8/29  Cr Worse  Na high  HE IS NOT GETTING ANY IVF AS ordered yesterday  He is non verbal  Thompson removed      Objective:   Vitals:    08/30/22 0418 08/30/22 0632 08/30/22 0839 08/30/22 1144   BP: (!) 183/91 (!) 179/92 (!) 158/81 (!) 113/57   Pulse: 90  97    Resp: 18  16    Temp:   98.1 °F (36.7 °C)    SpO2: 99%  98%    Weight:       Height:          No intake/output data recorded. Last 3 Recorded Weights in this Encounter    08/24/22 1618   Weight: 73 kg (160 lb 15 oz)      Physical exam:    GEN:  NAD  Ng tube  RESP: Clear  b/l, no  wheezing,   CVS: s1,s2,rrr  NEURO: Can't assess due to patient's current condition   EXT: no Edema +nt            Chart reviewed. Pertinent Notes reviewed.      Data Review :  Recent Labs     08/29/22  0145 08/28/22  0142   * 148*   K 3.9 3.8   * 119*   CO2 22 23   BUN 59* 50*   CREA 4.73* 4.33*   * 123*   CA 8.6 8.6   PHOS  -- 3.1       Recent Labs     08/29/22  0145   WBC 6.4   HGB 8.7*   HCT 28.0*          No results for input(s): FE, TIBC, PSAT, FERR in the last 72 hours. Medication list  reviewed  Current Facility-Administered Medications   Medication Dose Route Frequency    amLODIPine (NORVASC) tablet 10 mg  10 mg Per NG tube DAILY    labetaloL (NORMODYNE) tablet 100 mg  100 mg Per NG tube BID    vancomycin (VANCOCIN) 750 mg in 0.9% sodium chloride 250 mL ([id number])  750 mg IntraVENous Q48H    [START ON 8/31/2022] Vancomycin trough on 8/31 prior to the 1400 dose.  thanks   Other ONCE    dextrose 5% infusion  75 mL/hr IntraVENous CONTINUOUS    cefepime (MAXIPIME) 1 g in 0.9% sodium chloride (MBP/ADV) 50 mL MBP  1 g IntraVENous Q12H    metroNIDAZOLE (FLAGYL) IVPB premix 500 mg  500 mg IntraVENous Q12H    thiamine (B-1) 200 mg in 0.9% sodium chloride 50 mL IVPB  200 mg IntraVENous DAILY    hydrALAZINE (APRESOLINE) 20 mg/mL injection 20 mg  20 mg IntraVENous Q6H PRN    hydrALAZINE (APRESOLINE) 20 mg/mL injection 10 mg  10 mg IntraVENous Q6H    clopidogreL (PLAVIX) tablet 75 mg  75 mg Oral DAILY    labetaloL (NORMODYNE;TRANDATE) injection 10 mg  10 mg IntraVENous Q2H PRN    acetaminophen (TYLENOL) tablet 650 mg  650 mg Oral Q4H PRN    Or    acetaminophen (TYLENOL) solution 650 mg  650 mg Per NG tube Q4H PRN    Or    acetaminophen (TYLENOL) suppository 650 mg  650 mg Rectal Q4H PRN    aspirin chewable tablet 81 mg  81 mg Oral DAILY    atorvastatin (LIPITOR) tablet 40 mg  40 mg Oral QHS    heparin (porcine) injection 5,000 Units  5,000 Units SubCUTAneous Damaris Busch MD  8/30/2022

## 2022-08-30 NOTE — PROGRESS NOTES
Hospitalist Progress Note    NAME: Shira Soliz   :  1950   MRN:  [id number]         Assessment / Plan:    Acute metabolic encephalopathy POA  Acute left thalamic CVA POA  HTN emergency /111  History of CVA with no residual weakness as per family, POA  - Bed bound x 2 years, off medications, recently moved from [facility]   Bed bound due to sciatica  - Brought in with inability to talk, lethargy  - pCXR with no ASD  - VBG 7.30, pCO2 54  -Admit Head CT IMPRESSION    Chronic white matter disease and areas of remote infarction. No acute process by CT  - CTA head and neck IMPRESSION  CTA Head:  1. No evidence of flow-limiting stenosis or aneurysm. Multifocal moderate stenoses in the left P2 segment. CTA Neck:  1. No evidence of significant stenosis. - MRI brain IMPRESSION  Chronic infarction left cerebellum. Moderate chronic microvascular ischemic   disease in the supratentorial brain with small areas of chronic bilateral corona   radiata infarction. Small acute infarction ventral left thalamus. 1. Small acute infarction ventral left thalamus. 2. Chronic microvascular ischemic disease and areas of chronic infarction  - Echo LVEF 55 to 60%, normal wall motion, limited study  - EEG negative for seizure  - Continue aspirin, Plavix, statin  - AMS worsening  with ioncreased lethargy       CT head negative IMPRESSION  1. No evidence of acute intracranial abnormality. 2. Chronic microangiopathic white matter disease.  - Started on Keppra at admit, EEG was negative,          Keppra stopped as he also has significant kidney injury, ? Contributing to lethargy  - Blood pressure improved since admission, continue amlodipine. Add metoprolol IV scheduled with holding parameters    -Mentation remains poor, likely worsening of CVA. -Palliative meeting today, family, patient's wife wants to hold off on further investigation including MRI and labs.   -Wife is leaning toward hospice, though she wants to make sure with the children. ? Sepsis with elevated procalcitonin 18.9  PNA  - One low grade fever 100.3 since admit  - No leukocytosis, had 30% bands on labs yesterday, resolved  - Admit UA with 5 to 10 WBC, 5 to 10 RBC, 1+ bacteria   No urine culture sent  - repeat UA yesterday with 0-4 WBC  - Has been on ceftriaxone for a \"UTI\" since admit  - pt cannot verbalize complaints  Bcx negative  - CT sacn consistent with PNA  - Broaden antibiotics to vanco and cefepime once [facility] sent    Probable stage 4 chronic kidney disease POA  Hypernatremia Na 146 POA  VINICIO worse  - Creatinine worsening  -continue d5w  -Nephro on board      Currently has NG tube    Code Status: DNR  Surrogate Decision Maker: Patient's wife      DVT Prophylaxis: Heparin  GI Prophylaxis: not indicated     Baseline: Bedridden for almost 2 years from sciatica otherwise alert oriented x4 as per patient's son at bedside    Dispo: TBD, likely Hospice depending on family decision       Subjective:     Chief Complaint / Reason for Physician Visit  Remains nonverbal, opens eyes on painful stimuli. Review of Systems:  Symptom Y/N Comments  Symptom Y/N Comments   Fever/Chills    Chest Pain     Poor Appetite    Edema     Cough    Abdominal Pain     Sputum    Joint Pain     SOB/HO    Pruritis/Rash     Nausea/vomit    Tolerating PT/OT     Diarrhea    Tolerating Diet     Constipation    Other       Could NOT obtain due to: AMS, non verbal     Objective:     VITALS:   Last 24hrs VS reviewed since prior progress note.  Most recent are:  Patient Vitals for the past 24 hrs:   Temp Pulse Resp BP SpO2   08/30/22 1144 -- -- -- (!) 113/57 --   08/30/22 0839 98.1 °F (36.7 °C) 97 16 (!) 158/81 98 %   08/30/22 0632 -- -- -- (!) 179/92 --   08/30/22 0418 -- 90 18 (!) 183/91 99 %   08/29/22 2343 98.2 °F (36.8 °C) 80 18 (!) 167/88 99 %   08/29/22 2159 98.4 °F (36.9 °C) 91 18 (!) 186/91 97 %   08/29/22 1515 98.5 °F (36.9 °C) 80 20 (!) 180/95 97 %         Intake/Output Summary (Last 24 hours) at 8/30/2022 1235  Last data filed at 8/30/2022 0747  Gross per 24 hour   Intake 150 ml   Output --   Net 150 ml          I had a face to face encounter and independently examined this patient on 8/30/2022, as outlined below:    PHYSICAL EXAM:  General: WD, WN. Alert, cooperative, no acute distress    EENT:   Anicteric sclerae. MMM  Resp:  Rhonchi bilaterally, no wheezing or rales. No accessory muscle use  CV:  Regular  rhythm,  No edema  GI:  Soft, Non distended, No clear tenderness. +Bowel sounds  Neurologic:  Lethargic, not talking or following commands for me  Psych:   lethargic  Skin:  No rashes. No jaundice    Reviewed most current lab test results and cultures  YES  Reviewed most current radiology test results   YES  Review and summation of old records today    NO  Reviewed patient's current orders and MAR    YES  PMH/SH reviewed - no change compared to H&P  ________________________________________________________________________  Care Plan discussed with:    Comments   Patient x    Family  x    RN x    Care Manager     Consultant                        Multidiciplinary team rounds were held today with , nursing, pharmacist and clinical coordinator. Patient's plan of care was discussed; medications were reviewed and discharge planning was addressed. ________________________________________________________________________      Comments   >50% of visit spent in counseling and coordination of care     ________________________________________________________________________  Latonya Savage MD     Procedures: see electronic medical records for all procedures/Xrays and details which were not copied into this note but were reviewed prior to creation of Plan. LABS:  I reviewed today's most current labs and imaging studies.   Pertinent labs include:  Recent Labs     08/29/22 0145   WBC 6.4   HGB 8.7*   HCT 28.0*          Recent Labs     08/29/22 0145 08/28/22 0142 * 148*   K 3.9 3.8   * 119*   CO2 22 23   * 123*   BUN 59* 50*   CREA 4.73* 4.33*   CA 8.6 8.6   PHOS  --  3.1   ALB 2.5* 2.6*   TBILI 0.5  --    ALT 14  --          Signed: Samantha Ramirez MD

## 2022-08-30 NOTE — ACP (ADVANCE CARE PLANNING)
Primary Decision Maker: Reagan Gupta [phone]  Advance Care Planning 8/30/2022   Patient's Healthcare Decision Maker is: Legal Next of Kin   Confirm Advance Directive None   Patient Would Like to Complete Advance Directive Unable   Does the patient have other document types Do Not Resuscitate      Palliative team of Dr Kashmir Davis, Dr Eusebia Francis and this writer met with patient and his wife, Corrinne Charles. Patient is not able to participate in this meeting secondary to his CVA, his minimal responsiveness and his inability to communicate in any way. Son is well aware of patient's new functional status. She agrees to DNR status for patient, Kelly Pastrana would not want to live this way\". She notes that she is thinking of comfort care only for patient, would like to speak to his children first. Son signed DDNR today. Copy of document placed in hard chart for scanning and original given to Saint Hedwig for home. We also discussed a focus on comfort and hospice services which Son has experience with (her mother had hospice in the past). Saint Hedwig wants to speak to patient's children before she makes this decision. Palliative team will follow up with patient and family tomorrow regarding [address].

## 2022-08-30 NOTE — PROGRESS NOTES
Palliative Medicine      Code Status: DNR    Advance Care Planning: Patient does not have an AMD. His wife, Arminda Luz is his Franko Ruder and Medical Decision Maker. Patient lives with her. Advance Care Planning 8/29/2022   Patient's Healthcare Decision Maker is: Legal Next of Kin   Confirm Advance Directive None       Patient / Family Encounter Documentation    Participants (names): Patient (unable to participate, not alert and responsive at all despite sternal rub from Dr Pearl Duran, now with NG tube, appears to have some secretions per wife), wife, Arminda Luz, Palliative team of Dr Pearl Duran, Dr Praveena Courtney and this Martell Ding. Narrative: Sofya Richter is a 72 yo man who \"resisted seeing doctors all his life\" per his wife [facility]. He is originally from [place], lived in [place] and has been  from his wife, [facility], for 30 years. He has four children, two with [facility] who are local. From other relationships, he has a daughter who is in her 45s  in [place] and a 13 yo son in [place]. Per [facility], the children are aware of patient's current condition. Apparently patient was on the streets in [place], [facility] found out about this and she brought him back to [place] in November 2021. He has lived with her since. Per [facility], patient has been mostly bedbound. He used to walk with some assistance previously. She tried to get him to go and see a doctor, but he refused, Shiv Bryan is so stubborn\". [facility] shared that on 8/22/22, a Home Health nurse came to the house to see him for the first time, noted that patient had HTN \"she didn't tell me what his BP was, just that it was high\", later patient had a CVA and was brought to the hospital the next day. Today we discussed patient's overall medical condition worsening rather than improving. [facility] shared several times that \"he would not want to live like this\". We talked about Code Status, GOC. [facility] agreeable to patient being DNR and she signed a DDNR today.  We also discussed a focus on comfort vs aggressive interventions and diagnostics which Courtney Nguyen noted are not needed at this time. She is open to a focus on comfort care (she did tell us that hospitalist had discussed a focus on comfort with her earlier today) but she would like to discuss this with patient's children first. She also shared that patient's daughter from [place] is planning to come to see patient on 22 and will be staying through the weekend. Courtney Christinaaleyda agreeable to discontinuing any testing or invasive procedures such as lab draws, \"what's the use, what can they do anyway? \"    Psychosocial Issues Identified/ Resilience Factors: Courtney Nguyen is realistic, accepting and is willing to care for patient at home, if that is needed. She is familiar with hospice services; her mother had hospice at EOL. She has been a caregiver for several family members who have . Courtney Nguyen shared that she lives with patient and an adult daughter (in her 45s) who may have difficulty with patient dying at home. We did discuss that if hospice services are involved, that there would be support for daughter and family in addition to patient. We will follow up tomorrow with Courtney Nguyen (she is here daily) to see direction she wants to go in. Courtney Nguyen has some health issues, she uses a rollator, she was not able to walk down the hallway to go to another room to meet today, she may benefit from support and caregiving assistance if patient goes home. Courtney Nguyen shared with us a saved photograph of her and patient on their  day, patient looked much healthier at that time. Caregiver Alma: Moderate - Patient will be total care, he will be bed bound. He has been bed bound at home, secondary to sciatica, per Courtney Nguyen. She feels confident that he can be cared for, but she is observed to have some medical issues as well and may benefit from some help. Does the caregiver feel confident administering medication? Yes, if she is taught how to give meds.   Does the caregiver need any help connecting with community resources? Not at this time, open to a hospice referral once she speaks to her children. Does the caregiver feel confident assisting with activities of daily living? Yes, with some education and assistance. Goals of Care / Plan: Meet with Jessica Cruz tomorrow to discuss plan - Full Comfort and hospice? At this time, patient is comfort care in terms of no more lab draws, no more diagnostics. We agreed to continue the NG tube feedings for now, until a decision is made regarding goals/hospice. Thank you for including Palliative Medicine in the care of Mr Angie Baez.

## 2022-08-30 NOTE — PROGRESS NOTES
Attempted to see pt for PT tx. PT very lethargic and not arousing to sternal rub, painful stimuli or stimuli to the face. Session aborted. Not appropriate for PT. Will sign off a this time. Noted palliative talking to family about hospice.

## 2022-08-30 NOTE — PROGRESS NOTES
Patient ID:  Rubio Nelson  [id number]  85 y.o.  1950      Date of Consultation:  August 30, 2022    Subjective:       History of Present Illness:   Rubio Nelson is a 71 y.o. male with a history of prior stroke and prostate cancer who is currently admitted to the hospital after he was found to have a left thalamic stroke. Etiology of this was concerning for hypertension with blood pressure greater than 198/111. His hospital course has been complicated by progressive altered mentation and lethargy. Interval history  Patient continues to be lethargic and does not arouse to answer questions. He has had an NG tube placed as he is unable to participate with speech therapy. His wife was at bedside who states that she recently brought him home after being estranged for 30 years. Past Medical History:   Diagnosis Date    History of stroke     Prostate [facility])         History reviewed. No pertinent surgical history. History reviewed. No pertinent family history.      Social History     Tobacco Use    Smoking status: Never    Smokeless tobacco: Never   Substance Use Topics    Alcohol use: Not on file        No Known Allergies     Prior to Admission medications    Not on File       Review of Systems:    General, constitutional: negative  Eyes, vision: negative  Ears, nose, throat: negative  Cardiovascular, heart: negative  Respiratory: negative  Gastrointestinal: negative  Genitourinary: negative  Musculoskeletal: negative  Skin and integumentary: negative  Psychiatric: negative  Endocrine: negative  Neurological: negative, except for HPI  Hematologic/lymphatic: negative  Allergy/immunology: negative    Objective:     PHYSICAL EXAMINATION:   Visit Vitals  BP (!) 113/57   Pulse 97   Temp 98.1 °F (36.7 °C)   Resp 16   Ht 6' (1.829 m)   Wt 160 lb 15 oz (73 kg)   SpO2 98%   BMI 21.83 kg/m²       General:  NAD  Neck: no carotid bruits  Lungs: clear to auscultation  Heart:  no murmurs, regular rate and rhythm Lower extremity: no edema    Neuro exam:   Mental status: Did not open eyes to verbal stimuli however did to tactile stimuli. He did not have any verbal output. He did not name repeat or follow any commands  Pupils: 3 mm and reactive, he did have a left gaze preference. Unable to do funduscopic exam due to patient participation   Corneal reflex: Intact  Occulocephalic reflex: Intact  There may have been some slight right facial weakness. Motor/Sensory:    No spontaneous movement noted in the upper or lower extremities   Patient did withdraw to noxious stimulation in the extremities left greater than right    Tone: Increased tone in the left upper extremity and decreased tone in the right upper extremity    Reflexes: Depressed throughout   Plantar response: mute bilaterally     Cerebellar testing:  unable to perform due to patient participation   Gait: unable to assess due to cognitive status      Labs:     Lab Results   Component Value Date/Time    Hemoglobin A1c 5.3 08/24/2022 12:32 AM    Sodium 148 (H) 08/29/2022 01:45 AM    Potassium 3.9 08/29/2022 01:45 AM    Chloride 119 (H) 08/29/2022 01:45 AM    Glucose 148 (H) 08/29/2022 01:45 AM    BUN 59 (H) 08/29/2022 01:45 AM    Creatinine 4.73 (H) 08/29/2022 01:45 AM    Calcium 8.6 08/29/2022 01:45 AM    WBC 6.4 08/29/2022 01:45 AM    HCT 28.0 (L) 08/29/2022 01:45 AM    HGB 8.7 (L) 08/29/2022 01:45 AM    PLATELET 648 99/98/6214 01:45 AM       Imaging:    Results from Hospital Encounter encounter on 08/23/22    MRI BRAIN WO CONT    Narrative  INDICATION:   CVA work-up    EXAMINATION:  MRI BRAIN WO CONTRAST    COMPARISON:  CTA earlier today    TECHNIQUE:  Multiplanar multisequence acquisition without contrast of the brain. FINDINGS:    Ventricles:  Midline, no hydrocephalus. Brain Parenchyma/Brainstem:  Chronic infarction left cerebellum.  Moderate  chronic microvascular ischemic disease in the supratentorial brain with small  areas of chronic bilateral corona radiata infarction. Small acute infarction  ventral left thalamus. Intracranial Hemorrhage:  None. Basal Cisterns:  Normal.  Flow Voids:  Normal.  Additional Comments:  Diminished T1 marrow signal cervical spine. Impression  1. Small acute infarction ventral left thalamus. 2. Chronic microvascular ischemic disease and areas of chronic infarction as  above. 3. Nonspecific marrow signal cervical spine. Results from [place] encounter on 08/23/22    CT CHEST WO CONT    Narrative  EXAM: CT ABD PELV WO CONT, CT CHEST WO CONT    INDICATION: sepsis, unclear source    COMPARISON: None    IV CONTRAST: None    ORAL CONTRAST: None    TECHNIQUE:  Noncontrast thin axial images were obtained through the chest, abdomen and  pelvis. Coronal and sagittal reformats were generated. CT dose reduction was  achieved through use of a standardized protocol tailored for this examination  and automatic exposure control for dose modulation. FINDINGS:    CHEST WALL: No mass or axillary lymphadenopathy. THYROID: No nodule. MEDIASTINUM: No mass or lymphadenopathy. RAÚL: No mass or lymphadenopathy. THORACIC AORTA: No dissection or aneurysm. MAIN PULMONARY ARTERY: Enlarged 30 mm. TRACHEA/BRONCHI: Patent. ESOPHAGUS: No wall thickening or dilatation. HEART: Normal in size. Moderate coronary calcifications. PLEURA: No effusion or pneumothorax. LUNGS: Dependent groundglass and consolidative opacities in the lingula, right  middle lobe, and bilateral lower lobes. LIVER: Subcentimeter segment 5 hepatic hypodensity too small to characterize,  likely benign. BILIARY TREE: Gallbladder is within normal limits. CBD is not dilated. SPLEEN: within normal limits. PANCREAS: No mass or ductal dilatation. ADRENALS: Unremarkable. KIDNEYS: No mass, calculus, or hydronephrosis. Simple cysts do not require  follow-up. STOMACH: Unremarkable. SMALL BOWEL: No dilatation or wall thickening.   COLON: No dilatation or wall thickening. APPENDIX: Not well seen, may be surgically absent. PERITONEUM: No ascites or pneumoperitoneum. RETROPERITONEUM: No lymphadenopathy or aortic aneurysm. REPRODUCTIVE ORGANS: Prostate and seminal vesicles are unremarkable. URINARY BLADDER: No mass or calculus. BONES: No destructive bone lesion. ABDOMINAL WALL: No mass or hernia. ADDITIONAL COMMENTS: N/A    Impression  1. Dependent groundglass and consolidative airspace disease in the lower lungs  most suggestive of multifocal pneumonia. 2.  No acute abdominal or pelvic findings. Assessment and plan  Grace Miguel is a 71 y.o. male with a history of prior stroke and prostate cancer who is currently admitted to the hospital after he was found to have a left thalamic stroke. Etiology of this was concerning for hypertension with blood pressure greater than 198/111. His hospital course has been complicated by progressive altered mentation and lethargy. Encephalopathy: Recurred since the stroke however has not improved during the course of the admission. It is possible that this could be due to extension of the stroke given his current neurological exam with gaze deviation as well as worsening right-sided weakness. Additionally his creatinine has been increasing which may also result in altered mentation and lethargy. -Agree with obtaining an MRI of the brain to ensure that his stroke has expanded.  -I did discuss with the wife that his current stroke would not explain his current mental status however if there was extension of the stroke he may be more lethargic  -Goals of care were briefly discussed with the wife who was at bedside.   She does report that he would not want to live the way he is now with a feeding tube and not being able to communicate with his family.  -Agree with a palliative care consult however will need to determine if his stroke has worsened which could be causing his current mental status.  -Continue with aspirin and Plavix for secondary stroke prevention.  -His blood pressure continues to be elevated will need to titrate his medications for a blood pressure goal of 140/90. Thank you for the consult. We will continue to follow. Please call with any additional questions.     Nahomi Nowak MD      Active Problems:    CVA (cerebral vascular accident) [facility]) (8/23/2022)      VINICIO (acute kidney injury) ([address] (8/23/2022)      Hypertensive urgency (8/23/2022)      Acute encephalopathy (8/23/2022)      Bilateral carotid artery stenosis (8/23/2022)      Thrombotic stroke involving left middle cerebral artery ([address] (8/23/2022)      Aphasia due to acute cerebrovascular accident (CVA) ([address] (8/23/2022)      Acute alteration in mental status (8/23/2022)      Convulsions ([address] (8/23/2022)      History of stroke (8/23/2022)      Prostate cancer ([address] (8/23/2022)

## 2022-08-30 NOTE — PROGRESS NOTES
End of Shift Note     Bedside shift change report given to Ronald Kang  (oncoming nurse) by Genie Prabhakar RN (offgoing nurse). Report included the following information SBAR, Kardex, Intake/Output, MAR, and Recent Results     Shift worked: Days    Shift summary and any significant changes:     Patient partially pulled NG tube. RN removed per MD.     Palliative met with family today, patient made DNR. Labs discontinued per palliative. Likely to go home with hospice tomorrow. Concerns for physician to address: None    [facility] phone for oncoming shift:   [phone]      Patient Information  Dewanda Osgood  71 y.o.  8/23/2022 11:14 AM by Aleyda Ames MD. Dewanda Osgood was admitted from Home     Problem List          Patient Active Problem List     Diagnosis Date Noted    CVA (cerebral vascular accident) ([address] 08/23/2022    VINICIO (acute kidney injury) ([address] 08/23/2022    Hypertensive urgency 08/23/2022    Acute encephalopathy 08/23/2022    Bilateral carotid artery stenosis 08/23/2022    Thrombotic stroke involving left middle cerebral artery ([address] 08/23/2022    Aphasia due to acute cerebrovascular accident (CVA) ([address] 08/23/2022    Acute alteration in mental status 08/23/2022    Convulsions ([address] 08/23/2022    History of stroke 08/23/2022    Prostate cancer ([address] 08/23/2022              Past Medical History:   Diagnosis Date    History of stroke      Prostate CA ([address]           Core Measures:  CVA: No No  CHF:No No  PNA:No No     Activity:  Activity Level: Bed Rest  Number times ambulated in hallways past shift: 0  Number of times OOB to chair past shift: 0     Cardiac:   Cardiac Monitoring: No      Cardiac Rhythm:      Access:   Current line(s): PIV   Central Line? No   PICC LINE? No      Genitourinary:   Urinary status: incontinent   Urinary Catheter?  No      Respiratory:   O2 Device: Nasal cannula  Chronic home O2 use?: NO  Incentive spirometer at bedside: NO     GI:  Last Bowel Movement Date:  (PTA)  Current diet:  DIET NPO Sips of Water with Meds  Passing flatus: YES  Tolerating current diet: NO     Pain Management:   Patient states pain is manageable on current regimen: N/A     Skin:  Shilo Score: 15  Interventions: PT/OT consult, limit briefs, internal/external urinary devices, and nutritional support     Patient Safety:  Fall Score:  Total Score: 3  Interventions: bed/chair alarm  High Fall Risk: Yes     DVT prophylaxis:  DVT prophylaxis Med- Yes  DVT prophylaxis SCD or CORRINE- No      Wounds: (If Applicable)  Wounds- No  Location      Active Consults:  IP CONSULT TO HOSPITALIST  IP CONSULT TO NEUROLOGY  IP CONSULT TO NEPHROLOGY  IP CONSULT TO PALLIATIVE CARE - PROVIDER     Length of Stay:  Expected LOS: 2d 21h  Actual LOS: 6  Discharge Plan: SNF when medically cleared

## 2022-08-30 NOTE — PROGRESS NOTES
Brief summary of visit, full note to follow. Patient with encephalopathy, nonverbal.  Palliative care team met with patient wife Zaid Ye, discussed his hospital course, she notes patient is not making any progress, she shared 'patient would not want to live like this\" not able to communicate, not able to eat, at base line patient was bed bound due to weakness of legs, able to communicate, he liked to \" eat \"     She does not want any further diagnostic work-up /or MRI, she is leaning towards hospice, she is well aware of hospice services with several family members. She decided for DNR. Goals are :  DNR she signed the DNR. Cancel MRI, stop lab work. She wants to discuss with their children before making a decision for hospice. Palliative care team will follow tomorrow. Plan coordinated with Cathryn Jensen. Thank you for allowing us to participate in the care of Mr. Jessica Cueto.

## 2022-08-31 NOTE — PROGRESS NOTES
[address]  XWZ:[id number]   :1950     Palliative care input noted and appreciated  No labs done since 22    Nothing much to add  We will sign off. Please call us if needed.       CVA (cerebral vascular accident) ([facility] [I63.9]  Acute encephalopathy [G93.40]  VINICIO (acute kidney injury) ([facility] [N17.9]  Hypertensive urgency [I16.0]     Nicanor Randall MD  [address] [facility]  [facility]  [facility], [address] [place], [address]  Phone - [phone]         Fax - [phone] [facility] Office  [address] [place] [address]  Phone - [phone]        Fax - [phone]

## 2022-08-31 NOTE — PROGRESS NOTES
Pharmacy Antimicrobial Kinetic Dosing    Indication for Antimicrobials: Sepsis     Current Regimen of Each Antimicrobial:  Vancomycin- Pharmacy to Dose (Start Date ; Day 5)  Cefepime 1g IV Q12H (Start Date 22; Day 5)  Metronidazole 500 mg IV q12h (start date ; day 5)     Previous Antimicrobial Therapy:      Goal Level: AUC: 400-600 mg/hr/Liter/day    Date Dose & Interval Measured (mcg/mL) Predicted AUC/JUANCHO    1750 mg x1  16.2     -- 14.3     750 mg IV Q48H 14.3 460     Date & time of next level: Vanc Tr , 1330    Dosing calculator used: [employer] calculator    Significant Positive Cultures:    blood: NG x 4 days pending    Conditions for Dosing Consideration: None    Labs:  Recent Labs     22  0145   CREA 4.73*   BUN 59*     Recent Labs     22  0145   WBC 6.4     Temp (24hrs), Av.3 °F (36.8 °C), Min:98.2 °F (36.8 °C), Max:98.4 °F (36.9 °C)        Creatinine Clearance (mL/min):   CrCl (Ideal Body Weight): 15.7   If actual weight < IBW: CrCl (Actual Body Weight) 14.8    Impression/Plan:   Patient is not a dialysis candidate/Nephrology  Continue Vancomycin 750 mg q48h for an expected AUC of 460. Will re-order BMP for the AM   Continue Cefepime 1 gm IV q12h and Metronidazole 500 mg IV q12h  Antimicrobial stop date TBD     Pharmacy will follow daily and adjust medications as appropriate for renal function and/or serum levels.     Thank you,  Leana Crane, PHARMD    Vancomycin Dosing Document    Documents located on pharmacy Teams site: Clinical Practice -> Antimicrobial Stewardship -> Antibiotics_Vancomycin     Aminoglycoside Dosing Document    Documents located on pharmacy Teams site: Clinical Practice -> Antimicrobial Stewardship -> Antibiotics_Aminoglycosides

## 2022-08-31 NOTE — PROGRESS NOTES
Spiritual Care Assessment/Progress Note  [facility]      NAME: Ruslan Lewis      MRN: [id number]  AGE: 71 y.o. SEX: male  Jehovah's witness Affiliation: Episcopal   Language: English     8/31/2022     Total Time (in minutes): 5     Spiritual Assessment begun in [facility] through conversation with:         []Patient        [] Family    [] Friend(s)        Reason for Consult: Palliative Care, Initial/Spiritual Assessment     Spiritual beliefs: (Please include comment if needed)     [] Identifies with a pio tradition:         [] Supported by a pio community:            [] Claims no spiritual orientation:           [] Seeking spiritual identity:                [] Adheres to an individual form of spirituality:           [x] Not able to assess:                           Identified resources for coping:      [] Prayer                               [] Music                  [] Guided Imagery     [] Family/friends                 [] Pet visits     [] Devotional reading                         [] Unknown     [] Other:                                              Interventions offered during this visit: (See comments for more details)    Patient Interventions: Initial visit           Plan of Care:     [] Support spiritual and/or cultural needs    [] Support AMD and/or advance care planning process      [] Support grieving process   [] Coordinate Rites and/or Rituals    [] Coordination with community clergy   [] No spiritual needs identified at this time   [] Detailed Plan of Care below (See Comments)  [] Make referral to Music Therapy  [] Make referral to Pet Therapy     [] Make referral to Addiction services  [] Make referral to [facility]  [] Make referral to Spiritual Care Partner  [] No future visits requested        [x] Contact Spiritual Care for further referrals     Comments: Attempted initial spiritual assessment with palliative pt in 3123.  Pt sleeping, provided empathic touch and prayer out loud. Pt appeared to open eyes but didn't say any words. Went back to sleep. Left card on tray table. Contact Spiritual Care for any further referrals.  Paz Lopez M.Div, [facility]   Paging Service [phone])

## 2022-08-31 NOTE — PROGRESS NOTES
End of Shift Note     Bedside shift change report given to Ricarda Olivia  (oncoming nurse) by Berenice Foreman RN (offgoing nurse). Report included the following information SBAR, Kardex, Intake/Output, MAR, and Recent Results     Shift worked: Days    Shift summary and any significant changes:     Palliative following    No significant changes to condition     Concerns for physician to address: None    [employer] phone for oncoming shift:   [phone]      Patient Information  Sanket Malhotra  71 y.o.  8/23/2022 11:14 AM by Aki Villanueva MD. Sanket Malhotra was admitted from Home     Problem List          Patient Active Problem List     Diagnosis Date Noted    CVA (cerebral vascular accident) ([address] 08/23/2022    VINICIO (acute kidney injury) ([address] 08/23/2022    Hypertensive urgency 08/23/2022    Acute encephalopathy 08/23/2022    Bilateral carotid artery stenosis 08/23/2022    Thrombotic stroke involving left middle cerebral artery ([address] 08/23/2022    Aphasia due to acute cerebrovascular accident (CVA) ([address] 08/23/2022    Acute alteration in mental status 08/23/2022    Convulsions ([address] 08/23/2022    History of stroke 08/23/2022    Prostate cancer ([address] 08/23/2022              Past Medical History:   Diagnosis Date    History of stroke      Prostate CA ([address]           Core Measures:  CVA: No No  CHF:No No  PNA:No No     Activity:  Activity Level: Bed Rest  Number times ambulated in hallways past shift: 0  Number of times OOB to chair past shift: 0     Cardiac:   Cardiac Monitoring: No      Cardiac Rhythm:      Access:   Current line(s): PIV   Central Line? No   PICC LINE? No      Genitourinary:   Urinary status: incontinent   Urinary Catheter?  No      Respiratory:   O2 Device: Nasal cannula  Chronic home O2 use?: NO  Incentive spirometer at bedside: NO     GI:  Last Bowel Movement Date:  (PTA)  Current diet:  DIET NPO Sips of Water with Meds  Passing flatus: YES  Tolerating current diet: NO     Pain Management:   Patient states pain is manageable on current regimen: N/A     Skin:  Shilo Score: 15  Interventions: PT/OT consult, limit briefs, internal/external urinary devices, and nutritional support     Patient Safety:  Fall Score:  Total Score: 3  Interventions: bed/chair alarm  High Fall Risk: Yes     DVT prophylaxis:  DVT prophylaxis Med- Yes  DVT prophylaxis SCD or CORRINE- No      Wounds: (If Applicable)  Wounds- No  Location      Active Consults:  IP CONSULT TO HOSPITALIST  IP CONSULT TO NEUROLOGY  IP CONSULT TO NEPHROLOGY  IP CONSULT TO PALLIATIVE CARE - PROVIDER     Length of Stay:  Expected LOS: 2d 21h  Actual LOS: 8  Discharge Plan: No

## 2022-08-31 NOTE — PROGRESS NOTES
Problem: Pressure Injury - Risk of  Goal: *Prevention of pressure injury  Description: Document Shilo Scale and appropriate interventions in the flowsheet. Outcome: Progressing Towards Goal  Note: Pressure Injury Interventions:  Sensory Interventions: Assess changes in LOC    Moisture Interventions: Absorbent underpads, Check for incontinence Q2 hours and as needed    Activity Interventions: Assess need for specialty bed    Mobility Interventions: Pressure redistribution bed/mattress (bed type)    Nutrition Interventions: Document food/fluid/supplement intake    Friction and Shear Interventions: Apply protective barrier, creams and emollients                Problem: Patient Education: Go to Patient Education Activity  Goal: Patient/Family Education  Outcome: Progressing Towards Goal     Problem: Falls - Risk of  Goal: *Absence of Falls  Description: Document Gian Fall Risk and appropriate interventions in the flowsheet.   Outcome: Progressing Towards Goal  Note: Fall Risk Interventions:       Mentation Interventions: Adequate sleep, hydration, pain control    Medication Interventions: Bed/chair exit alarm    Elimination Interventions: Bed/chair exit alarm    History of Falls Interventions: Bed/chair exit alarm         Problem: Patient Education: Go to Patient Education Activity  Goal: Patient/Family Education  Outcome: Progressing Towards Goal     Problem: Patient Education: Go to Patient Education Activity  Goal: Patient/Family Education  Outcome: Progressing Towards Goal     Problem: Patient Education: Go to Patient Education Activity  Goal: Patient/Family Education  Outcome: Progressing Towards Goal     Problem: Patient Education: Go to Patient Education Activity  Goal: Patient/Family Education  Outcome: Progressing Towards Goal     Problem: Patient Education: Go to Patient Education Activity  Goal: Patient/Family Education  Outcome: Progressing Towards Goal     Problem: Patient Education: Go to Patient Education Activity  Goal: Patient/Family Education  Outcome: Progressing Towards Goal     Problem: TIA/CVA Stroke: 0-24 hours  Goal: Off Pathway (Use only if patient is Off Pathway)  Outcome: Progressing Towards Goal  Goal: Activity/Safety  Outcome: Progressing Towards Goal  Goal: Consults, if ordered  Outcome: Progressing Towards Goal  Goal: Diagnostic Test/Procedures  Outcome: Progressing Towards Goal  Goal: Nutrition/Diet  Outcome: Progressing Towards Goal  Goal: Discharge Planning  Outcome: Progressing Towards Goal  Goal: Medications  Outcome: Progressing Towards Goal  Goal: Respiratory  Outcome: Progressing Towards Goal  Goal: Treatments/Interventions/Procedures  Outcome: Progressing Towards Goal  Goal: Minimize risk of bleeding post-thrombolytic infusion  Outcome: Progressing Towards Goal  Goal: Monitor for complications post-thrombolytic infusion  Outcome: Progressing Towards Goal  Goal: Psychosocial  Outcome: Progressing Towards Goal  Goal: *Hemodynamically stable  Outcome: Progressing Towards Goal  Goal: *Neurologically stable  Description: Absence of additional neurological deficits    Outcome: Progressing Towards Goal  Goal: *Verbalizes anxiety and depression are reduced or absent  Outcome: Progressing Towards Goal  Goal: *Absence of Signs of Aspiration on Current Diet  Outcome: Progressing Towards Goal  Goal: *Absence of deep venous thrombosis signs and symptoms(Stroke Metric)  Outcome: Progressing Towards Goal  Goal: *Ability to perform ADLs and demonstrates progressive mobility and function  Outcome: Progressing Towards Goal  Goal: *Stroke education started(Stroke Metric)  Outcome: Progressing Towards Goal  Goal: *Dysphagia screen performed(Stroke Metric)  Outcome: Progressing Towards Goal  Goal: *Rehab consulted(Stroke Metric)  Outcome: Progressing Towards Goal     Problem: TIA/CVA Stroke: Day 2 Until Discharge  Goal: Off Pathway (Use only if patient is Off Pathway)  Outcome: Progressing Towards Goal  Goal: Activity/Safety  Outcome: Progressing Towards Goal  Goal: Diagnostic Test/Procedures  Outcome: Progressing Towards Goal  Goal: Nutrition/Diet  Outcome: Progressing Towards Goal  Goal: Discharge Planning  Outcome: Progressing Towards Goal  Goal: Medications  Outcome: Progressing Towards Goal  Goal: Respiratory  Outcome: Progressing Towards Goal  Goal: Treatments/Interventions/Procedures  Outcome: Progressing Towards Goal  Goal: Psychosocial  Outcome: Progressing Towards Goal  Goal: *Verbalizes anxiety and depression are reduced or absent  Outcome: Progressing Towards Goal  Goal: *Absence of aspiration  Outcome: Progressing Towards Goal  Goal: *Absence of deep venous thrombosis signs and symptoms(Stroke Metric)  Outcome: Progressing Towards Goal  Goal: *Optimal pain control at patient's stated goal  Outcome: Progressing Towards Goal  Goal: *Tolerating diet  Outcome: Progressing Towards Goal  Goal: *Ability to perform ADLs and demonstrates progressive mobility and function  Outcome: Progressing Towards Goal  Goal: *Stroke education continued(Stroke Metric)  Outcome: Progressing Towards Goal     Problem: Ischemic Stroke: Discharge Outcomes  Goal: *Verbalizes anxiety and depression are reduced or absent  Outcome: Progressing Towards Goal  Goal: *Verbalize understanding of risk factor modification(Stroke Metric)  Outcome: Progressing Towards Goal  Goal: *Hemodynamically stable  Outcome: Progressing Towards Goal  Goal: *Absence of aspiration pneumonia  Outcome: Progressing Towards Goal  Goal: *Aware of needed dietary changes  Outcome: Progressing Towards Goal  Goal: *Verbalize understanding of prescribed medications including anti-coagulants, anti-lipid, and/or anti-platelets(Stroke Metric)  Outcome: Progressing Towards Goal  Goal: *Tolerating diet  Outcome: Progressing Towards Goal  Goal: *Aware of follow-up diagnostics related to anticoagulants  Outcome: Progressing Towards Goal  Goal: *Ability to perform ADLs and demonstrates progressive mobility and function  Outcome: Progressing Towards Goal  Goal: *Absence of DVT(Stroke Metric)  Outcome: Progressing Towards Goal  Goal: *Absence of aspiration  Outcome: Progressing Towards Goal  Goal: *Optimal pain control at patient's stated goal  Outcome: Progressing Towards Goal  Goal: *Home safety concerns addressed  Outcome: Progressing Towards Goal  Goal: *Describes available resources and support systems  Outcome: Progressing Towards Goal  Goal: *Verbalizes understanding of activation of EMS(911) for stroke symptoms(Stroke Metric)  Outcome: Progressing Towards Goal  Goal: *Understands and describes signs and symptoms to report to providers(Stroke Metric)  Outcome: Progressing Towards Goal  Goal: *Neurolgocially stable (absence of additional neurological deficits)  Outcome: Progressing Towards Goal  Goal: *Verbalizes importance of follow-up with primary care physician(Stroke Metric)  Outcome: Progressing Towards Goal  Goal: *Smoking cessation discussed,if applicable(Stroke Metric)  Outcome: Progressing Towards Goal  Goal: *Depression screening completed(Stroke Metric)  Outcome: Progressing Towards Goal     Problem: Pressure Injury - Risk of  Goal: *Prevention of pressure injury  Description: Document Shilo Scale and appropriate interventions in the flowsheet.   8/31/2022 0153 by Silva Peres LPN  Outcome: Progressing Towards Goal  Note: Pressure Injury Interventions:  Sensory Interventions: Assess changes in LOC    Moisture Interventions: Absorbent underpads, Check for incontinence Q2 hours and as needed    Activity Interventions: Assess need for specialty bed    Mobility Interventions: Pressure redistribution bed/mattress (bed type)    Nutrition Interventions: Document food/fluid/supplement intake    Friction and Shear Interventions: Apply protective barrier, creams and emollients             8/31/2022 0151 by Silva Peres LPN  Outcome: Progressing Towards Goal  Note: Pressure Injury Interventions:  Sensory Interventions: Assess changes in LOC    Moisture Interventions: Absorbent underpads, Check for incontinence Q2 hours and as needed    Activity Interventions: Assess need for specialty bed    Mobility Interventions: Pressure redistribution bed/mattress (bed type)    Nutrition Interventions: Document food/fluid/supplement intake    Friction and Shear Interventions: Apply protective barrier, creams and emollients                Problem: Patient Education: Go to Patient Education Activity  Goal: Patient/Family Education  8/31/2022 0153 by Juliet Burkett LPN  Outcome: Progressing Towards Goal  8/31/2022 0151 by Juliet Burkett LPN  Outcome: Progressing Towards Goal     Problem: Falls - Risk of  Goal: *Absence of Falls  Description: Document Clive Lima Fall Risk and appropriate interventions in the flowsheet.   8/31/2022 0153 by Juliet Burkett LPN  Outcome: Progressing Towards Goal  Note: Fall Risk Interventions:       Mentation Interventions: Adequate sleep, hydration, pain control    Medication Interventions: Bed/chair exit alarm    Elimination Interventions: Bed/chair exit alarm    History of Falls Interventions: Bed/chair exit alarm      8/31/2022 0151 by Juliet Burkett LPN  Outcome: Progressing Towards Goal  Note: Fall Risk Interventions:       Mentation Interventions: Adequate sleep, hydration, pain control    Medication Interventions: Bed/chair exit alarm    Elimination Interventions: Bed/chair exit alarm    History of Falls Interventions: Bed/chair exit alarm         Problem: Patient Education: Go to Patient Education Activity  Goal: Patient/Family Education  8/31/2022 0153 by Juliet Burkett LPN  Outcome: Progressing Towards Goal  8/31/2022 0151 by Juliet Burkett LPN  Outcome: Progressing Towards Goal     Problem: Patient Education: Go to Patient Education Activity  Goal: Patient/Family Education  8/31/2022 0153 by Juliet Burkett LPN  Outcome: Progressing Towards Goal  8/31/2022 0151 by Moises Richards, LPN  Outcome: Progressing Towards Goal     Problem: Patient Education: Go to Patient Education Activity  Goal: Patient/Family Education  8/31/2022 0153 by Moises Richards, LPN  Outcome: Progressing Towards Goal  8/31/2022 0151 by Moises Richards LPN  Outcome: Progressing Towards Goal     Problem: Patient Education: Go to Patient Education Activity  Goal: Patient/Family Education  8/31/2022 0153 by Moiess Richards, LPN  Outcome: Progressing Towards Goal  8/31/2022 0151 by Moiess Richards, LPN  Outcome: Progressing Towards Goal     Problem: Patient Education: Go to Patient Education Activity  Goal: Patient/Family Education  8/31/2022 0153 by Moises Richards, LPN  Outcome: Progressing Towards Goal  8/31/2022 0151 by Moises Richards LPN  Outcome: Progressing Towards Goal     Problem: Patient Education: Go to Patient Education Activity  Goal: Patient/Family Education  8/31/2022 0153 by Moises Richards LPN  Outcome: Progressing Towards Goal  8/31/2022 0151 by Moises Richards LPN  Outcome: Progressing Towards Goal     Problem: TIA/CVA Stroke: 0-24 hours  Goal: Off Pathway (Use only if patient is Off Pathway)  8/31/2022 0153 by Moises Richards, LPN  Outcome: Progressing Towards Goal  8/31/2022 0151 by Moises Rihcards LPN  Outcome: Progressing Towards Goal  Goal: Activity/Safety  8/31/2022 0153 by Moises Richards, LPN  Outcome: Progressing Towards Goal  8/31/2022 0151 by Moises Richards, LPN  Outcome: Progressing Towards Goal  Goal: Consults, if ordered  8/31/2022 0153 by Moises Richards, LPN  Outcome: Progressing Towards Goal  8/31/2022 0151 by Moises Richards LPN  Outcome: Progressing Towards Goal  Goal: Diagnostic Test/Procedures  8/31/2022 0153 by Moises Richards, LPN  Outcome: Progressing Towards Goal  8/31/2022 0151 by Moises Richards LPN  Outcome: Progressing Towards Goal  Goal: Nutrition/Diet  8/31/2022 0153 by Moises Richards, LPN  Outcome: Progressing Towards Goal  8/31/2022 0151 by Willis Myers, LPN  Outcome: Progressing Towards Goal  Goal: Discharge Planning  8/31/2022 0153 by Willis Myers, LPN  Outcome: Progressing Towards Goal  8/31/2022 0151 by Willis Myers, LPN  Outcome: Progressing Towards Goal  Goal: Medications  8/31/2022 0153 by Willis Myers, LPN  Outcome: Progressing Towards Goal  8/31/2022 0151 by Willis Myers, LPN  Outcome: Progressing Towards Goal  Goal: Respiratory  8/31/2022 0153 by Willis Myers, LPN  Outcome: Progressing Towards Goal  8/31/2022 0151 by Willis Myers, LPN  Outcome: Progressing Towards Goal  Goal: Treatments/Interventions/Procedures  8/31/2022 0153 by Willis Myers, LPN  Outcome: Progressing Towards Goal  8/31/2022 0151 by Willis Myers, LPN  Outcome: Progressing Towards Goal  Goal: Minimize risk of bleeding post-thrombolytic infusion  8/31/2022 0153 by Willis Myers, LPN  Outcome: Progressing Towards Goal  8/31/2022 0151 by Willis Myers, LPN  Outcome: Progressing Towards Goal  Goal: Monitor for complications post-thrombolytic infusion  8/31/2022 0153 by Willis Myers, LPN  Outcome: Progressing Towards Goal  8/31/2022 0151 by Willis Myers, LPN  Outcome: Progressing Towards Goal  Goal: Psychosocial  8/31/2022 0153 by Willis Myers, LPN  Outcome: Progressing Towards Goal  8/31/2022 0151 by Willis Myers, LPN  Outcome: Progressing Towards Goal  Goal: *Hemodynamically stable  8/31/2022 0153 by Willis Myers, LPN  Outcome: Progressing Towards Goal  8/31/2022 0151 by Willis Myers, LPN  Outcome: Progressing Towards Goal  Goal: *Neurologically stable  Description: Absence of additional neurological deficits    8/31/2022 0153 by Willis Myers, LPN  Outcome: Progressing Towards Goal  8/31/2022 0151 by Willis Myers, LPN  Outcome: Progressing Towards Goal  Goal: *Verbalizes anxiety and depression are reduced or absent  8/31/2022 0153 by Willis Myers, LPN  Outcome: Progressing Towards Goal  8/31/2022 0151 by Willis Myers, LPN  Outcome: Progressing Towards Goal  Goal: *Absence of Signs of Aspiration on Current Diet  8/31/2022 0153 by Saud Salinas, LPN  Outcome: Progressing Towards Goal  8/31/2022 0151 by Saud Salinas, LPN  Outcome: Progressing Towards Goal  Goal: *Absence of deep venous thrombosis signs and symptoms(Stroke Metric)  8/31/2022 0153 by Saud Salinas, LPN  Outcome: Progressing Towards Goal  8/31/2022 0151 by Saud Salinas, LPN  Outcome: Progressing Towards Goal  Goal: *Ability to perform ADLs and demonstrates progressive mobility and function  8/31/2022 0153 by Saud Salinas, LPN  Outcome: Progressing Towards Goal  8/31/2022 0151 by Saud Salinas, LPN  Outcome: Progressing Towards Goal  Goal: *Stroke education started(Stroke Metric)  8/31/2022 0153 by Saud Salinas, LPN  Outcome: Progressing Towards Goal  8/31/2022 0151 by Saud Salinas, LPN  Outcome: Progressing Towards Goal  Goal: *Dysphagia screen performed(Stroke Metric)  8/31/2022 0153 by Saud Salinas, LPN  Outcome: Progressing Towards Goal  8/31/2022 0151 by Saud Salinas, LPN  Outcome: Progressing Towards Goal  Goal: *Rehab consulted(Stroke Metric)  8/31/2022 0153 by Saud Salinas, LPN  Outcome: Progressing Towards Goal  8/31/2022 0151 by Saud Salinas, LPN  Outcome: Progressing Towards Goal     Problem: TIA/CVA Stroke: Day 2 Until Discharge  Goal: Off Pathway (Use only if patient is Off Pathway)  8/31/2022 0153 by Saud Salinas, LPN  Outcome: Progressing Towards Goal  8/31/2022 0151 by Saud Salinas, LPN  Outcome: Progressing Towards Goal  Goal: Activity/Safety  8/31/2022 0153 by Saud Salinas, LPN  Outcome: Progressing Towards Goal  8/31/2022 0151 by Saud Salinas, LPN  Outcome: Progressing Towards Goal  Goal: Diagnostic Test/Procedures  8/31/2022 0153 by Saud Salinas, LPN  Outcome: Progressing Towards Goal  8/31/2022 0151 by Saud Salinas, LPN  Outcome: Progressing Towards Goal  Goal: Nutrition/Diet  8/31/2022 0153 by Saud Salinas, LPN  Outcome: Progressing Towards Goal  8/31/2022 0151 by Reymundo Ley, LPN  Outcome: Progressing Towards Goal  Goal: Discharge Planning  8/31/2022 0153 by Reymundo Ley, LPN  Outcome: Progressing Towards Goal  8/31/2022 0151 by Reymundo Ley, LPN  Outcome: Progressing Towards Goal  Goal: Medications  8/31/2022 0153 by Reymundo Ley, LPN  Outcome: Progressing Towards Goal  8/31/2022 0151 by Reymundo Ley, LPN  Outcome: Progressing Towards Goal  Goal: Respiratory  8/31/2022 0153 by Reymundo Ley, LPN  Outcome: Progressing Towards Goal  8/31/2022 0151 by Reymundo Ley, LPN  Outcome: Progressing Towards Goal  Goal: Treatments/Interventions/Procedures  8/31/2022 0153 by Reymundo Ley, LPN  Outcome: Progressing Towards Goal  8/31/2022 0151 by Reymundo Ley, LPN  Outcome: Progressing Towards Goal  Goal: Psychosocial  8/31/2022 0153 by Reymundo Ley, LPN  Outcome: Progressing Towards Goal  8/31/2022 0151 by Reymundo Ley, LPN  Outcome: Progressing Towards Goal  Goal: *Verbalizes anxiety and depression are reduced or absent  8/31/2022 0153 by Reymundo Ley, LPN  Outcome: Progressing Towards Goal  8/31/2022 0151 by Reymundo Ley, LPN  Outcome: Progressing Towards Goal  Goal: *Absence of aspiration  8/31/2022 0153 by Reymundo Ley, LPN  Outcome: Progressing Towards Goal  8/31/2022 0151 by Reymundo Ley, LPN  Outcome: Progressing Towards Goal  Goal: *Absence of deep venous thrombosis signs and symptoms(Stroke Metric)  8/31/2022 0153 by Reymundo Ley, LPN  Outcome: Progressing Towards Goal  8/31/2022 0151 by Reymundo Ley LPN  Outcome: Progressing Towards Goal  Goal: *Optimal pain control at patient's stated goal  8/31/2022 0153 by Reymundo Ley, LPN  Outcome: Progressing Towards Goal  8/31/2022 0151 by Reymundo Ley LPN  Outcome: Progressing Towards Goal  Goal: *Tolerating diet  8/31/2022 0153 by Reymundo Ley, LPN  Outcome: Progressing Towards Goal  8/31/2022 0151 by Reymundo Ley LPN  Outcome: Progressing Towards Goal  Goal: *Ability to perform ADLs and demonstrates progressive mobility and function  8/31/2022 0153 by Silva Roll, LPN  Outcome: Progressing Towards Goal  8/31/2022 0151 by Silva Roll, LPN  Outcome: Progressing Towards Goal  Goal: *Stroke education continued(Stroke Metric)  8/31/2022 0153 by Silva Roll, LPN  Outcome: Progressing Towards Goal  8/31/2022 0151 by Silva Roll, LPN  Outcome: Progressing Towards Goal     Problem: Ischemic Stroke: Discharge Outcomes  Goal: *Verbalizes anxiety and depression are reduced or absent  8/31/2022 0153 by Silva Roll, LPN  Outcome: Progressing Towards Goal  8/31/2022 0151 by Silva Roll, LPN  Outcome: Progressing Towards Goal  Goal: *Verbalize understanding of risk factor modification(Stroke Metric)  8/31/2022 0153 by Silva Roll, LPN  Outcome: Progressing Towards Goal  8/31/2022 0151 by Silva Roll, LPN  Outcome: Progressing Towards Goal  Goal: *Hemodynamically stable  8/31/2022 0153 by Silva Roll, LPN  Outcome: Progressing Towards Goal  8/31/2022 0151 by Silva Roll, LPN  Outcome: Progressing Towards Goal  Goal: *Absence of aspiration pneumonia  8/31/2022 0153 by Silva Roll, LPN  Outcome: Progressing Towards Goal  8/31/2022 0151 by Silva Roll, LPN  Outcome: Progressing Towards Goal  Goal: *Aware of needed dietary changes  8/31/2022 0153 by Silva Roll, LPN  Outcome: Progressing Towards Goal  8/31/2022 0151 by Silva Roll, LPN  Outcome: Progressing Towards Goal  Goal: *Verbalize understanding of prescribed medications including anti-coagulants, anti-lipid, and/or anti-platelets(Stroke Metric)  8/31/2022 0153 by Silva Roll, LPN  Outcome: Progressing Towards Goal  8/31/2022 0151 by Silva Roll, LPN  Outcome: Progressing Towards Goal  Goal: *Tolerating diet  8/31/2022 0153 by Silva Roll, LPN  Outcome: Progressing Towards Goal  8/31/2022 0151 by Silva Roll, LPN  Outcome: Progressing Towards Goal  Goal: *Aware of follow-up diagnostics related to anticoagulants  8/31/2022 0153 by Elvira Forde LPN  Outcome: Progressing Towards Goal  8/31/2022 0151 by Elvira Forde LPN  Outcome: Progressing Towards Goal  Goal: *Ability to perform ADLs and demonstrates progressive mobility and function  8/31/2022 0153 by Elvira Forde LPN  Outcome: Progressing Towards Goal  8/31/2022 0151 by Elvira Forde LPN  Outcome: Progressing Towards Goal  Goal: *Absence of DVT(Stroke Metric)  8/31/2022 0153 by Elvira Forde LPN  Outcome: Progressing Towards Goal  8/31/2022 0151 by Elvira Forde LPN  Outcome: Progressing Towards Goal  Goal: *Absence of aspiration  8/31/2022 0153 by Elvira Forde LPN  Outcome: Progressing Towards Goal  8/31/2022 0151 by Elvira Forde LPN  Outcome: Progressing Towards Goal  Goal: *Optimal pain control at patient's stated goal  8/31/2022 0153 by Elvira Forde LPN  Outcome: Progressing Towards Goal  8/31/2022 0151 by Elvira Forde LPN  Outcome: Progressing Towards Goal  Goal: *Home safety concerns addressed  8/31/2022 0153 by Elvira Forde LPN  Outcome: Progressing Towards Goal  8/31/2022 0151 by Elvira Forde LPN  Outcome: Progressing Towards Goal  Goal: *Describes available resources and support systems  8/31/2022 0153 by Elvira Forde LPN  Outcome: Progressing Towards Goal  8/31/2022 0151 by Elvira Forde LPN  Outcome: Progressing Towards Goal  Goal: *Verbalizes understanding of activation of EMS(911) for stroke symptoms(Stroke Metric)  8/31/2022 0153 by Elvira Forde LPN  Outcome: Progressing Towards Goal  8/31/2022 0151 by Elvira Forde LPN  Outcome: Progressing Towards Goal  Goal: *Understands and describes signs and symptoms to report to providers(Stroke Metric)  8/31/2022 0153 by Elvira Forde LPN  Outcome: Progressing Towards Goal  8/31/2022 0151 by Elvira Forde LPN  Outcome: Progressing Towards Goal  Goal: *Neurolgocially stable (absence of additional neurological deficits)  8/31/2022 0153 by Elvira Forde LPN  Outcome: Progressing Towards Goal  8/31/2022 0151 by Elvira Forde LPN  Outcome: Progressing Towards Goal  Goal: *Verbalizes importance of follow-up with primary care physician(Stroke Metric)  8/31/2022 0153 by Elvira Forde LPN  Outcome: Progressing Towards Goal  8/31/2022 0151 by Elvira Forde LPN  Outcome: Progressing Towards Goal  Goal: *Smoking cessation discussed,if applicable(Stroke Metric)  8/31/2022 0153 by Elvira Forde LPN  Outcome: Progressing Towards Goal  8/31/2022 0151 by Elvira Forde LPN  Outcome: Progressing Towards Goal  Goal: *Depression screening completed(Stroke Metric)  8/31/2022 0153 by Elvira Forde LPN  Outcome: Progressing Towards Goal  8/31/2022 0151 by Elvira Forde LPN  Outcome: Progressing Towards Goal

## 2022-08-31 NOTE — PROGRESS NOTES
Speech Pathology Note    Chart reviewed and spoke with RN. NGT now removed s/p patient partially pulling out NG tube. SLP visit attempted. At the time of SLP visit, patient not able to be aroused despite sternal rub, maximal verbal and tactile cues. Patient continues to not be appropriate for PO trials given decreased alertness/responsiveness. Will defer SLP visit on this date. Note plans for second Palliative meeting with patient's wife scheduled today.     Mihir Castillo M.S., CCC-SLP

## 2022-08-31 NOTE — PROGRESS NOTES
Participated in Palliative IDT where pt was discussed.   Sunita Chin M.Div, [facility]   Paging Service [phone])

## 2022-08-31 NOTE — PROGRESS NOTES
Palliative Medicine  [place]: [phone]  [facility]: [phone])     There is not a plan yet for patient to go home with hospice (as noted in several RN notes from the previous day and today). Palliative team is planning to meet with the wife, Jose Rodriguez today, when she comes in. We will see if she has had a chance to speak to her children regarding GOC. If family is open to hospice then we will place a consult for hospice to meet with them.

## 2022-08-31 NOTE — PROGRESS NOTES
Palliative Medicine  [facility]: [phone]  [facility]: [phone] ([phone])     Spoke to patient's wife, Haritha Brown via phone to see if she has had a chance to speak to patient's children yet regarding GOC/ possible comfort oriented measures. Mik Dupree noted that patient's son is coming from [place] tomorrow and she would like him to be able to see patient before any decisions are made. A daughter is coming in on Friday from [address]. Mik Hydero would like to wait till the children come and see patient before any decisions are made to further change GOC at this time. Our team will follow up with the family once the children have had a chance to see patient. ADDENDUM: LCSW met briefly with Haritha Brown, to offer support and assess coping. She is coping well, is realistic, accepting, is aware that patient's time is limited. She shared that she had told him that she would care for him if he ever got sick and that is what she did; she and her daughter brought him back to [address] [facility] from [place]. Mik Dupree is very supportive of patient and knows he will not survive this. She wants his children to have a chance to see him and support the decision for comfort; doesn't want to make this decision on her own.

## 2022-08-31 NOTE — PROGRESS NOTES
Hospitalist Progress Note    NAME: Shira Soliz   :  1950   MRN:  [id number]         Assessment / Plan:    Acute metabolic encephalopathy POA  Acute left thalamic CVA POA  HTN emergency /111  History of CVA with no residual weakness as per family, POA  - Bed bound x 2 years, off medications, recently moved from [facility]   Bed bound due to sciatica  - Brought in with inability to talk, lethargy  - pCXR with no ASD  - VBG 7.30, pCO2 54  -Admit Head CT IMPRESSION    Chronic white matter disease and areas of remote infarction. No acute process by CT  - CTA head and neck IMPRESSION  CTA Head:  1. No evidence of flow-limiting stenosis or aneurysm. Multifocal moderate stenoses in the left P2 segment. CTA Neck:  1. No evidence of significant stenosis. - MRI brain IMPRESSION  Chronic infarction left cerebellum. Moderate chronic microvascular ischemic   disease in the supratentorial brain with small areas of chronic bilateral corona   radiata infarction. Small acute infarction ventral left thalamus. 1. Small acute infarction ventral left thalamus. 2. Chronic microvascular ischemic disease and areas of chronic infarction  - Echo LVEF 55 to 60%, normal wall motion, limited study  - EEG negative for seizure  - Continue aspirin, Plavix, statin  - AMS worsening  with ioncreased lethargy       CT head negative IMPRESSION  1. No evidence of acute intracranial abnormality. 2. Chronic microangiopathic white matter disease.  - Started on Keppra at admit, EEG was negative,          Keppra stopped as he also has significant kidney injury, ? Contributing to lethargy  - Blood pressure improved since admission, continue amlodipine. Add metoprolol IV scheduled with holding parameters    -Mentation remains poor, likely worsening of CVA. -Palliative meeting today, patient's wife wants to await son and daughter's arrival prior to removing supportive care.  Discussed with palliative, will not pursue further investigative work up, can continue to draw labs for continued antibiotics. Decision possible Friday when family is all present. -Greatly appreciate palliative care time and expertise. ? Sepsis with elevated procalcitonin 18.9  PNA  - One low grade fever 100.3 since admit  - No leukocytosis, had 30% bands on labs yesterday, resolved  - Admit UA with 5 to 10 WBC, 5 to 10 RBC, 1+ bacteria   No urine culture sent  - repeat UA yesterday with 0-4 WBC  - Has been on ceftriaxone for a \"UTI\" since admit  - pt cannot verbalize complaints  Bcx negative  - CT sacn consistent with PNA  - Broaden antibiotics to vanco and cefepime once [facility] sent    Probable stage 4 chronic kidney disease POA  Hypernatremia Na 146 POA  VINICIO worse  - Creatinine worsening  -continue d5w  -Nephro on board    Patient removed own NGT. Palliative discussed with wife. Will not replace at this time. Code Status: DNR  Surrogate Decision Maker: Patient's wife      DVT Prophylaxis: Heparin  GI Prophylaxis: not indicated     Baseline: Bedridden for almost 2 years from sciatica otherwise alert oriented x4 as per patient's son at bedside    Dispo: TBD, likely Hospice depending on family decision       Subjective:     Chief Complaint / Reason for Physician Visit  Remains nonverbal, opens eyes on painful stimuli. Review of Systems:  Symptom Y/N Comments  Symptom Y/N Comments   Fever/Chills    Chest Pain     Poor Appetite    Edema     Cough    Abdominal Pain     Sputum    Joint Pain     SOB/HO    Pruritis/Rash     Nausea/vomit    Tolerating PT/OT     Diarrhea    Tolerating Diet     Constipation    Other       Could NOT obtain due to: AMS, non verbal     Objective:     VITALS:   Last 24hrs VS reviewed since prior progress note.  Most recent are:  Patient Vitals for the past 24 hrs:   Temp Pulse Resp BP SpO2   08/31/22 1550 98.6 °F (37 °C) 100 18 (!) 178/95 98 %   08/31/22 1138 98.4 °F (36.9 °C) 81 18 (!) 165/85 98 %   08/31/22 0613 -- 88 -- -- -- 08/30/22 1945 98.2 °F (36.8 °C) 81 18 (!) 170/82 98 %       No intake or output data in the 24 hours ending 08/31/22 1613       I had a face to face encounter and independently examined this patient on 8/31/2022, as outlined below:    PHYSICAL EXAM:  General: WD, WN. Alert, cooperative, no acute distress    EENT:   Anicteric sclerae. MMM  Resp:  Rhonchi bilaterally, no wheezing or rales. No accessory muscle use. Coarse breath sounds throughout. CV:  Regular  rhythm,  No edema  GI:  Soft, Non distended, No clear tenderness. +Bowel sounds  Neurologic:  Lethargic, not talking or following commands for me  Psych:   lethargic  Skin:  No rashes. No jaundice    Reviewed most current lab test results and cultures  YES  Reviewed most current radiology test results   YES  Review and summation of old records today    NO  Reviewed patient's current orders and MAR    YES  PMH/SH reviewed - no change compared to H&P  ________________________________________________________________________  Care Plan discussed with:    Comments   Patient x    Family  x    RN x    Care Manager     Consultant                        Multidiciplinary team rounds were held today with , nursing, pharmacist and clinical coordinator. Patient's plan of care was discussed; medications were reviewed and discharge planning was addressed. ________________________________________________________________________      Comments   >50% of visit spent in counseling and coordination of care     ________________________________________________________________________  Karen Buck MD     Procedures: see electronic medical records for all procedures/Xrays and details which were not copied into this note but were reviewed prior to creation of Plan. LABS:  I reviewed today's most current labs and imaging studies.   Pertinent labs include:  Recent Labs     08/29/22  0145   WBC 6.4   HGB 8.7*   HCT 28.0*          Recent Labs 08/29/22  0145   *   K 3.9   *   CO2 22   *   BUN 59*   CREA 4.73*   CA 8.6   ALB 2.5*   TBILI 0.5   ALT 14         Signed: Denece Home, MD

## 2022-08-31 NOTE — PROGRESS NOTES
End of Shift Note     Bedside shift change report given to Ortiz Doshi RN  (oncoming nurse) by Jerry Conti (offgoing nurse). Report included the following information SBAR, Kardex, Intake/Output, MAR, and Recent Results     Shift worked: Nights   Shift summary and any significant changes:     Patient partially pulled NG tube. RN removed per MD.      Likely to go home with hospice today. Concerns for physician to address: None    Zone phone for oncoming shift:   [phone]      Patient Information  Sandi Avila  71 y.o.  8/23/2022 11:14 AM by Edilma Thrasher MD. Sandi Avila was admitted from Home     Problem List          Patient Active Problem List     Diagnosis Date Noted    CVA (cerebral vascular accident) ([address] 08/23/2022    VINICIO (acute kidney injury) ([address] 08/23/2022    Hypertensive urgency 08/23/2022    Acute encephalopathy 08/23/2022    Bilateral carotid artery stenosis 08/23/2022    Thrombotic stroke involving left middle cerebral artery ([address] 08/23/2022    Aphasia due to acute cerebrovascular accident (CVA) ([address] 08/23/2022    Acute alteration in mental status 08/23/2022    Convulsions ([address] 08/23/2022    History of stroke 08/23/2022    Prostate cancer ([address] 08/23/2022              Past Medical History:   Diagnosis Date    History of stroke      Prostate CA ([address]           Core Measures:  CVA: No No  CHF:No No  PNA:No No     Activity:  Activity Level: Bed Rest  Number times ambulated in hallways past shift: 0  Number of times OOB to chair past shift: 0     Cardiac:   Cardiac Monitoring: No      Cardiac Rhythm:      Access:   Current line(s): PIV   Central Line? No   PICC LINE? No      Genitourinary:   Urinary status: incontinent   Urinary Catheter?  No      Respiratory:   O2 Device: Nasal cannula  Chronic home O2 use?: NO  Incentive spirometer at bedside: NO     GI:  Last Bowel Movement Date:  (PTA)  Current diet:  DIET NPO Sips of Water with Meds  Passing flatus: YES  Tolerating current diet: NO Pain Management:   Patient states pain is manageable on current regimen: N/A     Skin:  Shilo Score: 15  Interventions: PT/OT consult, limit briefs, internal/external urinary devices, and nutritional support     Patient Safety:  Fall Score:  Total Score: 3  Interventions: bed/chair alarm  High Fall Risk: Yes     DVT prophylaxis:  DVT prophylaxis Med- Yes  DVT prophylaxis SCD or CORRINE- No      Wounds: (If Applicable)  Wounds- No  Location      Active Consults:  IP CONSULT TO HOSPITALIST  IP CONSULT TO NEUROLOGY  IP CONSULT TO NEPHROLOGY  IP CONSULT TO PALLIATIVE CARE - PROVIDER     Length of Stay:  Expected LOS: 2d 21h  Actual LOS: 6  Discharge Plan: SNF when medically cleared     Parker Dodson

## 2022-09-01 NOTE — PROGRESS NOTES
End of Shift Note     Bedside shift change report given to Edd Ferguson RN  (oncoming nurse) by Sisi Nicole (offgoing nurse). Report included the following information SBAR, Kardex, Intake/Output, MAR, and Recent Results     Shift worked: Nights   Shift summary and any significant changes:         No significant changes to condition     Concerns for physician to address: None    Zone phone for oncoming shift:   [phone]      Patient Information  Anthony Montoya  71 y.o.  8/23/2022 11:14 AM by Raza Thomas MD. Anthony Montoya was admitted from Home     Problem List          Patient Active Problem List     Diagnosis Date Noted    CVA (cerebral vascular accident) ([address] 08/23/2022    VINICIO (acute kidney injury) ([address] 08/23/2022    Hypertensive urgency 08/23/2022    Acute encephalopathy 08/23/2022    Bilateral carotid artery stenosis 08/23/2022    Thrombotic stroke involving left middle cerebral artery ([address] 08/23/2022    Aphasia due to acute cerebrovascular accident (CVA) ([address] 08/23/2022    Acute alteration in mental status 08/23/2022    Convulsions ([address] 08/23/2022    History of stroke 08/23/2022    Prostate cancer ([address] 08/23/2022              Past Medical History:   Diagnosis Date    History of stroke      Prostate CA ([address]           Core Measures:  CVA: No No  CHF:No No  PNA:No No     Activity:  Activity Level: Bed Rest  Number times ambulated in hallways past shift: 0  Number of times OOB to chair past shift: 0     Cardiac:   Cardiac Monitoring: No      Cardiac Rhythm:      Access:   Current line(s): PIV   Central Line? No   PICC LINE? No      Genitourinary:   Urinary status: incontinent   Urinary Catheter?  No      Respiratory:   O2 Device: Nasal cannula  Chronic home O2 use?: NO  Incentive spirometer at bedside: NO     GI:  Last Bowel Movement Date:  (PTA)  Current diet:  DIET NPO Sips of Water with Meds  Passing flatus: YES  Tolerating current diet: NO     Pain Management:   Patient states pain is manageable on current regimen: N/A     Skin:  Shilo Score: 15  Interventions: PT/OT consult, limit briefs, internal/external urinary devices, and nutritional support     Patient Safety:  Fall Score:  Total Score: 3  Interventions: bed/chair alarm  High Fall Risk: Yes     DVT prophylaxis:  DVT prophylaxis Med- Yes  DVT prophylaxis SCD or CORRINE- No      Wounds: (If Applicable)  Wounds- No  Location      Active Consults:  IP CONSULT TO HOSPITALIST  IP CONSULT TO NEUROLOGY  IP CONSULT TO NEPHROLOGY  IP CONSULT TO PALLIATIVE CARE - PROVIDER     Length of Stay:  Expected LOS: 2d 21h  Actual LOS: 8  Discharge Plan: No

## 2022-09-01 NOTE — PROGRESS NOTES
Problem: Falls - Risk of  Goal: *Absence of Falls  Description: Document Emma Karyn Fall Risk and appropriate interventions in the flowsheet.   Note: Fall Risk Interventions:       Mentation Interventions: Bed/chair exit alarm, More frequent rounding    Medication Interventions: Bed/chair exit alarm    Elimination Interventions: Call light in reach    History of Falls Interventions: Bed/chair exit alarm

## 2022-09-01 NOTE — PROGRESS NOTES
Problem: Pressure Injury - Risk of  Goal: *Prevention of pressure injury  Description: Document Shilo Scale and appropriate interventions in the flowsheet. Outcome: Progressing Towards Goal  Note: Pressure Injury Interventions:  Sensory Interventions: Assess changes in LOC    Moisture Interventions: Absorbent underpads, Check for incontinence Q2 hours and as needed    Activity Interventions: Assess need for specialty bed    Mobility Interventions: Pressure redistribution bed/mattress (bed type)    Nutrition Interventions: Document food/fluid/supplement intake    Friction and Shear Interventions: Apply protective barrier, creams and emollients                Problem: Patient Education: Go to Patient Education Activity  Goal: Patient/Family Education  Outcome: Progressing Towards Goal     Problem: Falls - Risk of  Goal: *Absence of Falls  Description: Document Gian Fall Risk and appropriate interventions in the flowsheet.   Outcome: Progressing Towards Goal  Note: Fall Risk Interventions:       Mentation Interventions: Adequate sleep, hydration, pain control    Medication Interventions: Bed/chair exit alarm    Elimination Interventions: Bed/chair exit alarm    History of Falls Interventions: Bed/chair exit alarm         Problem: Patient Education: Go to Patient Education Activity  Goal: Patient/Family Education  Outcome: Progressing Towards Goal     Problem: Patient Education: Go to Patient Education Activity  Goal: Patient/Family Education  Outcome: Progressing Towards Goal     Problem: Patient Education: Go to Patient Education Activity  Goal: Patient/Family Education  Outcome: Progressing Towards Goal     Problem: Patient Education: Go to Patient Education Activity  Goal: Patient/Family Education  Outcome: Progressing Towards Goal     Problem: Patient Education: Go to Patient Education Activity  Goal: Patient/Family Education  Outcome: Progressing Towards Goal     Problem: Patient Education: Go to Patient Education Activity  Goal: Patient/Family Education  Outcome: Progressing Towards Goal     Problem: TIA/CVA Stroke: 0-24 hours  Goal: Off Pathway (Use only if patient is Off Pathway)  Outcome: Progressing Towards Goal  Goal: Activity/Safety  Outcome: Progressing Towards Goal  Goal: Consults, if ordered  Outcome: Progressing Towards Goal  Goal: Diagnostic Test/Procedures  Outcome: Progressing Towards Goal  Goal: Nutrition/Diet  Outcome: Progressing Towards Goal  Goal: Discharge Planning  Outcome: Progressing Towards Goal  Goal: Medications  Outcome: Progressing Towards Goal  Goal: Respiratory  Outcome: Progressing Towards Goal  Goal: Treatments/Interventions/Procedures  Outcome: Progressing Towards Goal  Goal: Minimize risk of bleeding post-thrombolytic infusion  Outcome: Progressing Towards Goal  Goal: Monitor for complications post-thrombolytic infusion  Outcome: Progressing Towards Goal  Goal: Psychosocial  Outcome: Progressing Towards Goal  Goal: *Hemodynamically stable  Outcome: Progressing Towards Goal  Goal: *Neurologically stable  Description: Absence of additional neurological deficits    Outcome: Progressing Towards Goal  Goal: *Verbalizes anxiety and depression are reduced or absent  Outcome: Progressing Towards Goal  Goal: *Absence of Signs of Aspiration on Current Diet  Outcome: Progressing Towards Goal  Goal: *Absence of deep venous thrombosis signs and symptoms(Stroke Metric)  Outcome: Progressing Towards Goal  Goal: *Ability to perform ADLs and demonstrates progressive mobility and function  Outcome: Progressing Towards Goal  Goal: *Stroke education started(Stroke Metric)  Outcome: Progressing Towards Goal  Goal: *Dysphagia screen performed(Stroke Metric)  Outcome: Progressing Towards Goal  Goal: *Rehab consulted(Stroke Metric)  Outcome: Progressing Towards Goal     Problem: TIA/CVA Stroke: Day 2 Until Discharge  Goal: Off Pathway (Use only if patient is Off Pathway)  Outcome: Progressing Towards Goal  Goal: Activity/Safety  Outcome: Progressing Towards Goal  Goal: Diagnostic Test/Procedures  Outcome: Progressing Towards Goal  Goal: Nutrition/Diet  Outcome: Progressing Towards Goal  Goal: Discharge Planning  Outcome: Progressing Towards Goal  Goal: Medications  Outcome: Progressing Towards Goal  Goal: Respiratory  Outcome: Progressing Towards Goal  Goal: Treatments/Interventions/Procedures  Outcome: Progressing Towards Goal  Goal: Psychosocial  Outcome: Progressing Towards Goal  Goal: *Verbalizes anxiety and depression are reduced or absent  Outcome: Progressing Towards Goal  Goal: *Absence of aspiration  Outcome: Progressing Towards Goal  Goal: *Absence of deep venous thrombosis signs and symptoms(Stroke Metric)  Outcome: Progressing Towards Goal  Goal: *Optimal pain control at patient's stated goal  Outcome: Progressing Towards Goal  Goal: *Tolerating diet  Outcome: Progressing Towards Goal  Goal: *Ability to perform ADLs and demonstrates progressive mobility and function  Outcome: Progressing Towards Goal  Goal: *Stroke education continued(Stroke Metric)  Outcome: Progressing Towards Goal     Problem: Ischemic Stroke: Discharge Outcomes  Goal: *Verbalizes anxiety and depression are reduced or absent  Outcome: Progressing Towards Goal  Goal: *Verbalize understanding of risk factor modification(Stroke Metric)  Outcome: Progressing Towards Goal  Goal: *Hemodynamically stable  Outcome: Progressing Towards Goal  Goal: *Absence of aspiration pneumonia  Outcome: Progressing Towards Goal  Goal: *Aware of needed dietary changes  Outcome: Progressing Towards Goal  Goal: *Verbalize understanding of prescribed medications including anti-coagulants, anti-lipid, and/or anti-platelets(Stroke Metric)  Outcome: Progressing Towards Goal  Goal: *Tolerating diet  Outcome: Progressing Towards Goal  Goal: *Aware of follow-up diagnostics related to anticoagulants  Outcome: Progressing Towards Goal  Goal: *Ability to perform ADLs and demonstrates progressive mobility and function  Outcome: Progressing Towards Goal  Goal: *Absence of DVT(Stroke Metric)  Outcome: Progressing Towards Goal  Goal: *Absence of aspiration  Outcome: Progressing Towards Goal  Goal: *Optimal pain control at patient's stated goal  Outcome: Progressing Towards Goal  Goal: *Home safety concerns addressed  Outcome: Progressing Towards Goal  Goal: *Describes available resources and support systems  Outcome: Progressing Towards Goal  Goal: *Verbalizes understanding of activation of EMS(911) for stroke symptoms(Stroke Metric)  Outcome: Progressing Towards Goal  Goal: *Understands and describes signs and symptoms to report to providers(Stroke Metric)  Outcome: Progressing Towards Goal  Goal: *Neurolgocially stable (absence of additional neurological deficits)  Outcome: Progressing Towards Goal  Goal: *Verbalizes importance of follow-up with primary care physician(Stroke Metric)  Outcome: Progressing Towards Goal  Goal: *Smoking cessation discussed,if applicable(Stroke Metric)  Outcome: Progressing Towards Goal  Goal: *Depression screening completed(Stroke Metric)  Outcome: Progressing Towards Goal

## 2022-09-01 NOTE — PROGRESS NOTES
Speech Pathology Note    Chart reviewed and spoke with RN. Patient continues to be unresponsive and not appropriate for PO trials. Note family [place] from [place] of [place] to meet Friday to discuss GOC/possible comfort oriented measures. Will defer SLP visit on this date and following up pending Palliative meeting/as patient is appropriate. Thank you.     Livier Oquendo M.S., CCC-SLP

## 2022-09-01 NOTE — PROGRESS NOTES
Hospitalist Progress Note    NAME: Ricardo Ames   :  1950   MRN:  [id number]         Assessment / Plan:    Acute metabolic encephalopathy POA  Acute left thalamic CVA POA  HTN emergency /111  History of CVA with no residual weakness as per family, POA  - Bed bound x 2 years, off medications, recently moved from [facility]   Bed bound due to sciatica  - Brought in with inability to talk, lethargy  - pCXR with no ASD  - VBG 7.30, pCO2 54  -Admit Head CT IMPRESSION    Chronic white matter disease and areas of remote infarction. No acute process by CT  - CTA head and neck IMPRESSION  CTA Head:  1. No evidence of flow-limiting stenosis or aneurysm. Multifocal moderate stenoses in the left P2 segment. CTA Neck:  1. No evidence of significant stenosis. - MRI brain IMPRESSION  Chronic infarction left cerebellum. Moderate chronic microvascular ischemic   disease in the supratentorial brain with small areas of chronic bilateral corona   radiata infarction. Small acute infarction ventral left thalamus. 1. Small acute infarction ventral left thalamus. 2. Chronic microvascular ischemic disease and areas of chronic infarction  - Echo LVEF 55 to 60%, normal wall motion, limited study  - EEG negative for seizure  - Continue aspirin, Plavix, statin  - AMS worsening  with ioncreased lethargy       CT head negative IMPRESSION  1. No evidence of acute intracranial abnormality. 2. Chronic microangiopathic white matter disease.  - Started on Keppra at admit, EEG was negative,          Keppra stopped as he also has significant kidney injury, ? Contributing to lethargy  - Blood pressure improved since admission, continue amlodipine. Add metoprolol IV scheduled with holding parameters    -Mentation remains poor, likely worsening of CVA. -Palliative on board, patient's wife wants to await son and daughter's arrival prior to removing supportive care.  Discussed with palliative, will not pursue further investigative work up, can continue to draw labs for continued antibiotics. Decision possible Friday when family is all present. -Greatly appreciate palliative care time and expertise. ? Sepsis with elevated procalcitonin 18.9  PNA  - One low grade fever 100.3 since admit  - No leukocytosis, had 30% bands on labs yesterday, resolved  - Admit UA with 5 to 10 WBC, 5 to 10 RBC, 1+ bacteria   No urine culture sent  - repeat UA yesterday with 0-4 WBC  - Has been on ceftriaxone for a \"UTI\" since admit  - pt cannot verbalize complaints  Bcx negative  - CT sacn consistent with PNA  - Broaden antibiotics to vanco and cefepime once [facility] sent    Probable stage 4 chronic kidney disease POA  Hypernatremia Na 146 POA  VINICIO worse  - Creatinine worsening  -continue d5w  -Nephro on board    Patient removed own NGT. Palliative discussed with wife. Will not replace at this time. Code Status: DNR  Surrogate Decision Maker: Patient's wife      DVT Prophylaxis: Heparin  GI Prophylaxis: not indicated     Baseline: Bedridden for almost 2 years from sciatica otherwise alert oriented x4 as per patient's son at bedside    Dispo: TBD, likely Hospice depending on family decision       Subjective:     Chief Complaint / Reason for Physician Visit  Remains nonverbal, snoring. Review of Systems:  Symptom Y/N Comments  Symptom Y/N Comments   Fever/Chills    Chest Pain     Poor Appetite    Edema     Cough    Abdominal Pain     Sputum    Joint Pain     SOB/HO    Pruritis/Rash     Nausea/vomit    Tolerating PT/OT     Diarrhea    Tolerating Diet     Constipation    Other       Could NOT obtain due to: AMS, non verbal     Objective:     VITALS:   Last 24hrs VS reviewed since prior progress note.  Most recent are:  Patient Vitals for the past 24 hrs:   Temp Pulse Resp BP SpO2   09/01/22 0849 98.7 °F (37.1 °C) (!) 112 20 (!) 176/90 93 %   08/31/22 2350 97.6 °F (36.4 °C) (!) 132 18 (!) 157/82 94 %   08/31/22 1550 98.6 °F (37 °C) 100 18 (!) 178/95 98 %       No intake or output data in the 24 hours ending 09/01/22 1155       I had a face to face encounter and independently examined this patient on 9/1/2022, as outlined below:    PHYSICAL EXAM:  General: WD, WN. Alert, cooperative, no acute distress    EENT:   Anicteric sclerae. MMM  Resp:  Rhonchi bilaterally, no wheezing or rales. No accessory muscle use. Coarse breath sounds throughout. CV:  Regular  rhythm,  No edema  GI:  Soft, Non distended, No clear tenderness. +Bowel sounds  Neurologic:  Lethargic, not talking or following commands for me  Psych:   lethargic  Skin:  No rashes. No jaundice    Reviewed most current lab test results and cultures  YES  Reviewed most current radiology test results   YES  Review and summation of old records today    NO  Reviewed patient's current orders and MAR    YES  PMH/SH reviewed - no change compared to H&P  ________________________________________________________________________  Care Plan discussed with:    Comments   Patient x    Family  x    RN x    Care Manager     Consultant                        Multidiciplinary team rounds were held today with , nursing, pharmacist and clinical coordinator. Patient's plan of care was discussed; medications were reviewed and discharge planning was addressed. ________________________________________________________________________      Comments   >50% of visit spent in counseling and coordination of care     ________________________________________________________________________  Earmon Gilford, MD     Procedures: see electronic medical records for all procedures/Xrays and details which were not copied into this note but were reviewed prior to creation of Plan. LABS:  I reviewed today's most current labs and imaging studies. Pertinent labs include:  No results for input(s): WBC, HGB, HCT, PLT, HGBEXT, HCTEXT, PLTEXT, HGBEXT, HCTEXT, PLTEXT in the last 72 hours.     Recent Labs     09/01/22  0148   NA 151*   K 4.9   *   CO2 19*   *   BUN 81*   CREA 5.14*   CA 8.7         Signed: Cristi Cheng MD

## 2022-09-01 NOTE — PROGRESS NOTES
End of Shift Note     Bedside shift change report given to Kaylie Stokes [place]  (oncoming nurse) by Kiya Young R.N(offgoing nurse). Report included the following information SBAR, Kardex, Intake/Output, MAR, and Recent Results     Shift worked: DAYS   Shift summary and any significant changes:        Remains non-responsive,suctioned secretion      Concerns for physician to address: None    Zone phone for oncoming shift:   [phone]      Patient Information  Fabienne Bangura  71 y.o.  8/23/2022 11:14 AM by Chao Holloway MD. Fabienne Bangura was admitted from Home     Problem List          Patient Active Problem List     Diagnosis Date Noted    CVA (cerebral vascular accident) ([address] 08/23/2022    VINICIO (acute kidney injury) ([address] 08/23/2022    Hypertensive urgency 08/23/2022    Acute encephalopathy 08/23/2022    Bilateral carotid artery stenosis 08/23/2022    Thrombotic stroke involving left middle cerebral artery ([address] 08/23/2022    Aphasia due to acute cerebrovascular accident (CVA) ([address] 08/23/2022    Acute alteration in mental status 08/23/2022    Convulsions ([address] 08/23/2022    History of stroke 08/23/2022    Prostate cancer ([address] 08/23/2022              Past Medical History:   Diagnosis Date    History of stroke      Prostate CA ([address]           Core Measures:  CVA: No No  CHF:No No  PNA:No No     Activity:  Activity Level: Bed Rest  Number times ambulated in hallways past shift: 0  Number of times OOB to chair past shift: 0     Cardiac:   Cardiac Monitoring: No      Cardiac Rhythm:      Access:   Current line(s): PIV   Central Line? No   PICC LINE? No      Genitourinary:   Urinary status: incontinent   Urinary Catheter?  No      Respiratory:   O2 Device: Nasal cannula  Chronic home O2 use?: NO  Incentive spirometer at bedside: NO     GI:  Last Bowel Movement Date:  (PTA)  Current diet:  DIET NPO Sips of Water with Meds  Passing flatus: YES  Tolerating current diet: NO     Pain Management:   Patient states pain is manageable on current regimen: N/A     Skin:  Shilo Score: 15  Interventions: PT/OT consult, limit briefs, internal/external urinary devices, and nutritional support     Patient Safety:  Fall Score:  Total Score: 3  Interventions: bed/chair alarm  High Fall Risk: Yes     DVT prophylaxis:  DVT prophylaxis Med- Yes  DVT prophylaxis SCD or CORRINE- No      Wounds: (If Applicable)  Wounds- No  Location      Active Consults:  IP CONSULT TO HOSPITALIST  IP CONSULT TO NEUROLOGY  IP CONSULT TO NEPHROLOGY  IP CONSULT TO PALLIATIVE CARE - PROVIDER     Length of Stay:  Expected LOS: 2d 21h  Actual LOS: 8  Discharge Plan: No

## 2022-09-01 NOTE — PROGRESS NOTES
Transition of Care Plan:    RUR: 16% - Moderate  Disposition: Home w/ family - Hospice ? Follow up appointments: TBD  DME needed: TBD  Transportation at Discharge:will need EMS  [place] or means to access home:      per family   IM Medicare Letter:2nd IM letter prior to discharge  Is patient a  and connected with the [address] [facility] [address]? No     If yes, was [employer] transfer form completed and [facility] notified? Caregiver Contact: Wife Rich Edwards - [phone]  Discharge Caregiver contacted prior to discharge? Will need to keep family updated  Care Conference needed?:     Not at present time    CM continuing to follow to assist with discharge planning. Palliative care has been meeting with family to discuss possible hospice. Patient has family coming from [place]  and [place] - should be here by Friday and will then make final decisions about plan of care.     Alejandro Lawler RN, BSN, [address] [place] [address]  Manager of Case Management  [phone]

## 2022-09-02 NOTE — PROGRESS NOTES
Comprehensive Nutrition Assessment    Type and Reason for Visit: Reassess    Nutrition Recommendations/Plan:   Continue NPO for now. Further nutrition intervention and recs pending family decisions regarding [address]. Malnutrition Assessment:  Malnutrition Status: Moderate malnutrition (08/29/22 1139)    Context:  Acute illness     Findings of the 6 clinical characteristics of malnutrition:   Energy Intake:  50% or less of est energy requirements for 5 or more days  Weight Loss:  Unable to assess     Body Fat Loss:  Mild body fat loss,     Muscle Mass Loss:  Unable to assess,    Fluid Accumulation:  No significant fluid accumulation,     Strength:  Not performed     Nutrition Assessment:    9/2: Chart reviewed; med noted for CVA on admission. RD visited with pt at bedside, sleeping soundly. Remains NPO. Awaiting family to arrive to discuss GOC/possible hospice. Last Weight Metric  Weight Loss Metrics 8/24/2022 8/23/2022   Today's Wt 160 lb 15 oz -   BMI - 21.83 kg/m2      Nutrition Related Findings:    Labs: Na+ 154, ; Meds: Lipitor, Thiamine      Current Nutrition Intake & Therapies:  Average Meal Intake: NPO     DIET NPO Sips of Water with Meds    Anthropometric Measures:  Height: 6' (182.9 cm)  Ideal Body Weight (IBW): 178 lbs (81 kg)     Current Body Wt:  73 kg (160 lb 15 oz), 90.4 % IBW. Current BMI (kg/m2): 21.8                          BMI Category: Normal weight (BMI 18.5-24. 9)    Estimated Daily Nutrient Needs:  Energy Requirements Based On: Formula  Weight Used for Energy Requirements: Current  Energy (kcal/day): 7294-2106 kcals (BMR 1523 x 1.2-1. 3AF)  Weight Used for Protein Requirements: Current  Protein (g/day): 73-88g (1.0-1.2 g/kg bw)  Method Used for Fluid Requirements: 1 ml/kcal  Fluid (ml/day): 2000 mL    Nutrition Diagnosis:   Inadequate protein-energy intake related to cognitive or neurological impairment, swallowing difficulty as evidenced by NPO or clear liquid status due to medical condition    Nutrition Interventions:   Food and/or Nutrient Delivery: Continue NPO  Nutrition Education/Counseling: No recommendations at this time  Coordination of Nutrition Care: Continue to monitor while inpatient       Goals:  Previous Goal Met: No progress toward goal(s)  Goals: other (specify)  Specify Other Goals: Establish GOC prior to next RD assessment    Nutrition Monitoring and Evaluation:   Behavioral-Environmental Outcomes: None identified  Food/Nutrient Intake Outcomes: Other (specify) ([address] currently in progress)  Physical Signs/Symptoms Outcomes: Biochemical data, Chewing or swallowing, Weight, Skin, Fluid status or edema, Hemodynamic status    Discharge Planning:     Too soon to determine    Ani Jackman RD  Contact:

## 2022-09-02 NOTE — PROGRESS NOTES
Hospitalist Progress Note    NAME: Grace Miguel   :  1950   MRN:  [id number]         Assessment / Plan:    Acute metabolic encephalopathy POA  Acute left thalamic CVA POA  HTN emergency /111  History of CVA with no residual weakness as per family, POA  - Bed bound x 2 years, off medications, recently moved from [facility]   Bed bound due to sciatica  - Brought in with inability to talk, lethargy  - pCXR with no ASD  - VBG 7.30, pCO2 54  -Admit Head CT IMPRESSION    Chronic white matter disease and areas of remote infarction. No acute process by CT  - CTA head and neck IMPRESSION  CTA Head:  1. No evidence of flow-limiting stenosis or aneurysm. Multifocal moderate stenoses in the left P2 segment. CTA Neck:  1. No evidence of significant stenosis. - MRI brain IMPRESSION  Chronic infarction left cerebellum. Moderate chronic microvascular ischemic   disease in the supratentorial brain with small areas of chronic bilateral corona   radiata infarction. Small acute infarction ventral left thalamus. 1. Small acute infarction ventral left thalamus. 2. Chronic microvascular ischemic disease and areas of chronic infarction  - Echo LVEF 55 to 60%, normal wall motion, limited study  - EEG negative for seizure  - Continue aspirin, Plavix, statin  - AMS worsening  with ioncreased lethargy       CT head negative IMPRESSION  1. No evidence of acute intracranial abnormality. 2. Chronic microangiopathic white matter disease.  - Started on Keppra at admit, EEG was negative,          Keppra stopped as he also has significant kidney injury, ? Contributing to lethargy  - Blood pressure improved since admission, continue amlodipine. Add metoprolol IV scheduled with holding parameters    -Mentation remains poor, likely worsening of CVA. -Palliative on board, patient's wife wants to await son and daughter's arrival prior to removing supportive care.  Discussed with palliative, will not pursue further investigative work up, can continue to draw labs for continued antibiotics. Decision possible Friday when family is all present. -Greatly appreciate palliative care time and expertise. ? Sepsis with elevated procalcitonin 18.9  PNA  - One low grade fever 100.3 since admit  - No leukocytosis, had 30% bands on labs yesterday, resolved  - Admit UA with 5 to 10 WBC, 5 to 10 RBC, 1+ bacteria   No urine culture sent  - repeat UA yesterday with 0-4 WBC  - Has been on ceftriaxone for a \"UTI\" since admit  - pt cannot verbalize complaints  Bcx negative  - CT sacn consistent with PNA  - Broaden antibiotics to vanco and cefepime once [facility] sent  - Discussed with palliative and spouse, okay to DC antibiotics as it will not change his current course. Probable stage 4 chronic kidney disease POA  Hypernatremia Na 146 POA  VINICIO worse  - Creatinine worsening  -continue d5w  -Nephro on board    Patient removed own NGT. Palliative discussed with wife. Will not replace at this time. Code Status: DNR  Surrogate Decision Maker: Patient's wife      DVT Prophylaxis: Heparin  GI Prophylaxis: not indicated     Baseline: Bedridden for almost 2 years from sciatica otherwise alert oriented x4 as per patient's son at bedside    Dispo: TBD, likely Hospice depending on family decision       Subjective:     Chief Complaint / Reason for Physician Visit  Remains nonverbal.    Spoke to wife today. Family will meet tomorrow and present to hospital tomorrow. Wife advised she can contact physician all day. Review of Systems:  Symptom Y/N Comments  Symptom Y/N Comments   Fever/Chills    Chest Pain     Poor Appetite    Edema     Cough    Abdominal Pain     Sputum    Joint Pain     SOB/HO    Pruritis/Rash     Nausea/vomit    Tolerating PT/OT     Diarrhea    Tolerating Diet     Constipation    Other       Could NOT obtain due to: AMS, non verbal     Objective:     VITALS:   Last 24hrs VS reviewed since prior progress note.  Most recent are:  Patient Vitals for the past 24 hrs:   Temp Pulse Resp BP SpO2   09/02/22 1744 99 °F (37.2 °C) 100 -- 122/77 99 %   09/02/22 0800 -- (!) 112 -- -- --   09/02/22 0750 98.3 °F (36.8 °C) (!) 121 20 (!) 182/92 98 %   09/02/22 0523 -- (!) 115 -- (!) 158/100 --   09/02/22 0517 -- (!) 115 -- (!) 158/100 --   09/01/22 2338 98.7 °F (37.1 °C) (!) 117 19 128/77 98 %   09/01/22 2103 -- (!) 121 -- 139/87 --   09/01/22 2040 98.8 °F (37.1 °C) (!) 121 21 139/87 98 %       No intake or output data in the 24 hours ending 09/02/22 1917       I had a face to face encounter and independently examined this patient on 9/2/2022, as outlined below:    PHYSICAL EXAM:  General: WD, WN. Alert, cooperative, no acute distress    EENT:   Anicteric sclerae. MMM  Resp:  Rhonchi bilaterally, no wheezing or rales. No accessory muscle use. Coarse breath sounds throughout. CV:  Regular  rhythm,  No edema  GI:  Soft, Non distended, No clear tenderness. +Bowel sounds  Neurologic:  Lethargic, not talking or following commands for me  Psych:   lethargic  Skin:  No rashes. No jaundice    Reviewed most current lab test results and cultures  YES  Reviewed most current radiology test results   YES  Review and summation of old records today    NO  Reviewed patient's current orders and MAR    YES  PMH/SH reviewed - no change compared to H&P  ________________________________________________________________________  Care Plan discussed with:    Comments   Patient x    Family  x Called wife   RN x    Care Manager     Consultant                        Multidiciplinary team rounds were held today with , nursing, pharmacist and clinical coordinator. Patient's plan of care was discussed; medications were reviewed and discharge planning was addressed.      ________________________________________________________________________      Comments   >50% of visit spent in counseling and coordination of care ________________________________________________________________________  Zaria Conner MD     Procedures: see electronic medical records for all procedures/Xrays and details which were not copied into this note but were reviewed prior to creation of Plan. LABS:  I reviewed today's most current labs and imaging studies. Pertinent labs include:  No results for input(s): WBC, HGB, HCT, PLT, HGBEXT, HCTEXT, PLTEXT, HGBEXT, HCTEXT, PLTEXT in the last 72 hours.     Recent Labs     09/02/22  0306 09/01/22  0148   * 151*   K 5.0 4.9   * 123*   CO2 19* 19*   * 165*   BUN 92* 81*   CREA 5.72* 5.14*   CA 9.1 8.7         Signed: Zaria Conner MD

## 2022-09-02 NOTE — PROGRESS NOTES
End of Shift Note     Bedside shift change report given to Kaylie Edouard  (oncoming nurse) by [place] nurse). Report included the following information SBAR, Kardex, Intake/Output, MAR, and Recent Results     Shift worked: 7a-7p   Shift summary and any significant changes:     No significant changes      Concerns for physician to address: None    Zone phone for oncoming shift:   [phone]      Patient Information  Staci Clements  71 y.o.  8/23/2022 11:14 AM by Guyann Prader, MD. Staci Clements was admitted from Home     Problem List          Patient Active Problem List     Diagnosis Date Noted    CVA (cerebral vascular accident) ([address] 08/23/2022    VINICIO (acute kidney injury) ([address] 08/23/2022    Hypertensive urgency 08/23/2022    Acute encephalopathy 08/23/2022    Bilateral carotid artery stenosis 08/23/2022    Thrombotic stroke involving left middle cerebral artery ([address] 08/23/2022    Aphasia due to acute cerebrovascular accident (CVA) ([address] 08/23/2022    Acute alteration in mental status 08/23/2022    Convulsions ([address] 08/23/2022    History of stroke 08/23/2022    Prostate cancer ([address] 08/23/2022              Past Medical History:   Diagnosis Date    History of stroke      Prostate CA ([address]           Core Measures:  CVA: No No  CHF:No No  PNA:No No     Activity:  Activity Level: Bed Rest  Number times ambulated in hallways past shift: 0  Number of times OOB to chair past shift: 0     Cardiac:   Cardiac Monitoring: No      Cardiac Rhythm:      Access:   Current line(s): PIV   Central Line? No   PICC LINE? No      Genitourinary:   Urinary status: incontinent   Urinary Catheter?  No      Respiratory:   O2 Device: Nasal cannula  Chronic home O2 use?: NO  Incentive spirometer at bedside: NO     GI:  Last Bowel Movement Date:  (PTA)  Current diet:  DIET NPO Sips of Water with Meds  Passing flatus: YES  Tolerating current diet: NO     Pain Management:   Patient states pain is manageable on current regimen: N/A Skin:  Shilo Score: 15  Interventions: PT/OT consult, limit briefs, internal/external urinary devices, and nutritional support     Patient Safety:  Fall Score:  Total Score: 3  Interventions: bed/chair alarm  High Fall Risk: Yes     DVT prophylaxis:  DVT prophylaxis Med- Yes  DVT prophylaxis SCD or CORRINE- No      Wounds: (If Applicable)  Wounds- No  Location      Active Consults:  IP CONSULT TO HOSPITALIST  IP CONSULT TO NEUROLOGY  IP CONSULT TO NEPHROLOGY  IP CONSULT TO PALLIATIVE CARE - PROVIDER     Length of Stay:  Expected LOS: 2d 21h  Actual LOS: 10  Discharge Plan: No

## 2022-09-02 NOTE — PROGRESS NOTES
Problem: Pressure Injury - Risk of  Goal: *Prevention of pressure injury  Description: Document Shilo Scale and appropriate interventions in the flowsheet. Outcome: Progressing Towards Goal  Note: Pressure Injury Interventions:  Sensory Interventions: Keep linens dry and wrinkle-free    Moisture Interventions: Absorbent underpads, Apply protective barrier, creams and emollients    Activity Interventions: Increase time out of bed, Pressure redistribution bed/mattress(bed type)    Mobility Interventions: Float heels, Pressure redistribution bed/mattress (bed type)    Nutrition Interventions: Discuss nutritional consult with provider    Friction and Shear Interventions: HOB 30 degrees or less                Problem: Falls - Risk of  Goal: *Absence of Falls  Description: Document Gian Fall Risk and appropriate interventions in the flowsheet.   Outcome: Progressing Towards Goal  Note: Fall Risk Interventions:       Mentation Interventions: Adequate sleep, hydration, pain control, Bed/chair exit alarm, Door open when patient unattended    Medication Interventions: Bed/chair exit alarm, Patient to call before getting OOB, Teach patient to arise slowly    Elimination Interventions: Bed/chair exit alarm, Call light in reach, Patient to call for help with toileting needs, Stay With Me (per policy), Toileting schedule/hourly rounds    History of Falls Interventions: Bed/chair exit alarm         Problem: Patient Education: Go to Patient Education Activity  Goal: Patient/Family Education  Outcome: Progressing Towards Goal     Problem: TIA/CVA Stroke: 0-24 hours  Goal: Activity/Safety  Outcome: Progressing Towards Goal  Goal: Consults, if ordered  Outcome: Progressing Towards Goal  Goal: Diagnostic Test/Procedures  Outcome: Progressing Towards Goal  Goal: Nutrition/Diet  Outcome: Not Progressing Towards Goal  Goal: Discharge Planning  Outcome: Progressing Towards Goal  Goal: Medications  Outcome: Progressing Towards Goal  Goal: Respiratory  Outcome: Progressing Towards Goal  Goal: Treatments/Interventions/Procedures  Outcome: Progressing Towards Goal  Goal: Minimize risk of bleeding post-thrombolytic infusion  Outcome: Progressing Towards Goal  Goal: Monitor for complications post-thrombolytic infusion  Outcome: Progressing Towards Goal  Goal: Psychosocial  Outcome: Not Progressing Towards Goal  Goal: *Hemodynamically stable  Outcome: Progressing Towards Goal  Goal: *Neurologically stable  Description: Absence of additional neurological deficits    Outcome: Not Progressing Towards Goal  Goal: *Verbalizes anxiety and depression are reduced or absent  Outcome: Not Progressing Towards Goal  Goal: *Absence of Signs of Aspiration on Current Diet  Outcome: Progressing Towards Goal

## 2022-09-03 NOTE — HOSPICE
[facility] RN note: In to meet with pt, wife Jovi Golden, along with daughters Pam De Leon (lives with pt) and Itzel (from [employer]) , son Evaristo García (from [place]). Pt is unresponsive, peacefully resting, in no obvious distress. Discussed hospice philosophy and services provided under the medicare hospice benefit. All in agreement with bringing pt home with hospice support. Wife Jovi Golden has cared for family members under hospice. Consent forms signed by Jovi Golden. DME ordered for delivery 9/3 PM.  AMR set for 10:00am 9/4 with hospice admission RN to be at home shortly after. Information packet with 24hr contact information provided. Ricardo Ames [birth year] #[id number]  Dx: ESRD/CVA Date of consult: 9/3  Discharge date: 9/4/22 AMR 10:00am with AM admission following  DDNR:  yes, copied and scanned  Consents: yes, scanned to folder   Meds: dilaudid and ativan through [facility], family to  9/3  Equip: 02, gel overlay obt for delivery in in afternoon 9/3 #[id number]  Clinical notes: lives with wife, 6 kids gathering from [facility], decision for hospice at home. Wife is familiar with hospice services from other family members who have utilized benefit. Thank you for the opportunity to care for this pt and family. Please contact hospice at [phone] with any questions or concerns.

## 2022-09-03 NOTE — HOSPICE
[facility] Those in [facility]  [phone]    Hospice Nursing PRE-Admission   Discharge Summary  Patient Name: Desi Ansari  YOB: 1950  Age: 71 y.o. Date of PLANNED Hospice Admission: 22  Hospice Attending: Dr Ady Magaña  Primary Care Physician: Nyra [address]   [address] [facility] [address]    Primary Contact and Phone: daughter Nuno Paulson [phone]      ADVANCE CARE PLANNING    Code Status: DNR  Durable DNR: [x]  Yes  []  No  Advance Care Planning 2022   Patient's Healthcare Decision Maker is: Legal Next of Kin   Confirm Advance Directive None   Patient Would Like to Complete Advance Directive Unable   Does the patient have other document types Do Not Resuscitate       Tenriism: [employer]   Home: TBD    HOSPICE SUMMARY     Verbal CTI of terminal diagnosis with life expectancy of 6 months or less received from: email sent to Dr Svetlana Davison    For the Hospice Diagnosis of: CVA, renal failure with cr 5.72 and trending up. NCD: Requested      CLINICAL INFORMATION   Allergies: No Known Allergies      Currently this patient has:  [x] Supplemental O2 [] Peripheral IV  [] PICC    [] PORT   [] Thompson Catheter [] NG Tube   [] PEG Tube [] Ostomy    [] AICD: Has ICD been deactivated? [] Yes [] Wounds      COVID Screening: Positive   Negative      ASSESSMENT & PLAN     1. Symptom Issues Identified: unresponsive, no evidence of any distress    2. Spiritual Issues  Identified: to be evaluated by hospice chaplain    3.   Psych/ Social/ Emotional Issues Identified: lives with wife [facility] and dtr Ziggy Lopez, several other adult children gathering from [place] and [address] [place] [address] 51 #[id number]  Dx: ESRD/CVA Date of consult: 9/3  Discharge date: 22 AMR 10:00am with AM admission following  DDNR:  yes, copied and scanned  Consents: yes, scanned to folder   Meds: dilaudid and ativan through [facility], family to  9/3  Equip: 02, gel overlay obt for delivery in in afternoon 9/3 #[id number]  Clinical notes: lives with wife and dtr, 6 kids total, some gathering from [address] [place] [address], decision for hospice at home. Wife is familiar with hospice services from other family members who have utilized benefit.

## 2022-09-03 NOTE — PROGRESS NOTES
Care Management:    Transition of Care Plan:     RUR:   16%   Disposition: Home w/ family - and [facility]. Referral sent via [place] . Follow up appointments: TBD  DME needed: TBD  Transportation at Discharge:BLS set up with LASHONDA for 10 am  tomorrow Sunday 9/4. Paperwork on paper chart. Keys or means to access home:      per family   IM Medicare Letter:2nd IM letter prior to discharge  Is patient a [place] and connected with the [facility]? No     If yes, was [employer] transfer form completed and VA notified? Caregiver Contact: Wife Litzy Mccoy - [phone]  Discharge Caregiver contacted prior to discharge? Will need to keep family updated  Care Conference needed?:     Not at present time     LEANA spoke with Crystal with hospice and plan for discharge tomorrow home with [facility] and LASHONDA doing 10 am transport.      Yakov Hogan RN [facility] 0695

## 2022-09-03 NOTE — PROGRESS NOTES
Hospitalist Progress Note    NAME: Manisha Orona   :  1950   MRN:  [id number]         Assessment / Plan:    Acute metabolic encephalopathy POA  Acute left thalamic CVA POA  HTN emergency /111  History of CVA with no residual weakness as per family, POA  - Bed bound x 2 years, off medications, recently moved from [facility]   Bed bound due to sciatica  - Brought in with inability to talk, lethargy  - pCXR with no ASD  - VBG 7.30, pCO2 54  -Admit Head CT IMPRESSION    Chronic white matter disease and areas of remote infarction. No acute process by CT  - CTA head and neck IMPRESSION  CTA Head:  1. No evidence of flow-limiting stenosis or aneurysm. Multifocal moderate stenoses in the left P2 segment. CTA Neck:  1. No evidence of significant stenosis. - MRI brain IMPRESSION  Chronic infarction left cerebellum. Moderate chronic microvascular ischemic   disease in the supratentorial brain with small areas of chronic bilateral corona   radiata infarction. Small acute infarction ventral left thalamus. 1. Small acute infarction ventral left thalamus. 2. Chronic microvascular ischemic disease and areas of chronic infarction  - Echo LVEF 55 to 60%, normal wall motion, limited study  - EEG negative for seizure  - Continue aspirin, Plavix, statin  - AMS worsening  with ioncreased lethargy       CT head negative IMPRESSION  1. No evidence of acute intracranial abnormality. 2. Chronic microangiopathic white matter disease.  - Started on Keppra at admit, EEG was negative,          Keppra stopped as he also has significant kidney injury, ? Contributing to lethargy  - Blood pressure improved since admission, continue amlodipine. Add metoprolol IV scheduled with holding parameters    -Mentation remains poor, likely worsening of CVA. -Palliative on board, patient's wife and children at bedside today and agree with hospice consult. Will place consult.       ? Sepsis with elevated procalcitonin 18.9  PNA  - One low grade fever 100.3 since admit  - No leukocytosis, had 30% bands on labs yesterday, resolved  - Admit UA with 5 to 10 WBC, 5 to 10 RBC, 1+ bacteria   No urine culture sent  - repeat UA yesterday with 0-4 WBC  - Has been on ceftriaxone for a \"UTI\" since admit  - pt cannot verbalize complaints  Bcx negative  - CT sacn consistent with PNA  - Broaden antibiotics to vanco and cefepime once [facility] sent  - Discussed with palliative and spouse, okay to DC antibiotics as it will not change his current course. Probable stage 4 chronic kidney disease POA  Hypernatremia Na 146 POA  VINICIO worse  - Creatinine worsening  -continue d5w  -Nephro on board    Patient removed own NGT. Palliative discussed with wife. Will not replace at this time. Code Status: DNR  Surrogate Decision Maker: Patient's wife      DVT Prophylaxis: Heparin  GI Prophylaxis: not indicated     Baseline: Bedridden for almost 2 years from sciatica otherwise alert oriented x4 as per patient's son at bedside    Dispo: TBD, likely Hospice depending on family decision       Subjective:     Chief Complaint / Reason for Physician Visit  Remains nonverbal. Snoring, occasionally opens his eyes. His daughter is concerned about lack of NGT feeds or IVF. Informed of patient removing his own NGT and palliative discussion with wife endorsed no replacement. Briefly spoke about hospice service. Wife agreed to consult and home with hospice tomorrow. Review of Systems:  Symptom Y/N Comments  Symptom Y/N Comments   Fever/Chills    Chest Pain     Poor Appetite    Edema     Cough    Abdominal Pain     Sputum    Joint Pain     SOB/HO    Pruritis/Rash     Nausea/vomit    Tolerating PT/OT     Diarrhea    Tolerating Diet     Constipation    Other       Could NOT obtain due to: AMS, non verbal     Objective:     VITALS:   Last 24hrs VS reviewed since prior progress note.  Most recent are:  Patient Vitals for the past 24 hrs:   Temp Pulse Resp BP SpO2   09/03/22 0742 97.7 °F (36.5 °C) 96 15 (!) 142/86 100 %   09/02/22 2316 98 °F (36.7 °C) (!) 101 -- 117/79 98 %   09/02/22 2045 98.2 °F (36.8 °C) (!) 103 20 124/78 99 %   09/02/22 1744 99 °F (37.2 °C) 100 -- 122/77 99 %       No intake or output data in the 24 hours ending 09/03/22 1415       I had a face to face encounter and independently examined this patient on 9/3/2022, as outlined below:    PHYSICAL EXAM:  General: WD, WN. Alert, cooperative, no acute distress    EENT:   Anicteric sclerae. MMM  Resp:  Rhonchi bilaterally, no wheezing or rales. No accessory muscle use. Coarse breath sounds throughout. CV:  Regular  rhythm,  No edema  GI:  Soft, Non distended, No clear tenderness. +Bowel sounds  Neurologic:  Lethargic, not talking or following commands for me  Psych:   lethargic  Skin:  No rashes. No jaundice    Reviewed most current lab test results and cultures  YES  Reviewed most current radiology test results   YES  Review and summation of old records today    NO  Reviewed patient's current orders and MAR    YES  PMH/SH reviewed - no change compared to H&P  ________________________________________________________________________  Care Plan discussed with:    Comments   Patient x    Family  x At bedside   RN x    Care Manager     Consultant                        Multidiciplinary team rounds were held today with , nursing, pharmacist and clinical coordinator. Patient's plan of care was discussed; medications were reviewed and discharge planning was addressed. ________________________________________________________________________      Comments   >50% of visit spent in counseling and coordination of care     ________________________________________________________________________  Melisa Vega MD     Procedures: see electronic medical records for all procedures/Xrays and details which were not copied into this note but were reviewed prior to creation of Plan.       LABS:  I reviewed today's most current labs and imaging studies. Pertinent labs include:  No results for input(s): WBC, HGB, HCT, PLT, HGBEXT, HCTEXT, PLTEXT, HGBEXT, HCTEXT, PLTEXT in the last 72 hours.     Recent Labs     09/02/22  0306 09/01/22  0148   * 151*   K 5.0 4.9   * 123*   CO2 19* 19*   * 165*   BUN 92* 81*   CREA 5.72* 5.14*   CA 9.1 8.7         Signed: Lucia Felder MD

## 2022-09-03 NOTE — PROGRESS NOTES
Problem: Pressure Injury - Risk of  Goal: *Prevention of pressure injury  Description: Document Shilo Scale and appropriate interventions in the flowsheet. Outcome: Progressing Towards Goal  Note: Pressure Injury Interventions:  Sensory Interventions: Check visual cues for pain, Float heels, Keep linens dry and wrinkle-free    Moisture Interventions: Absorbent underpads, Apply protective barrier, creams and emollients, Maintain skin hydration (lotion/cream)    Activity Interventions: Increase time out of bed, Pressure redistribution bed/mattress(bed type)    Mobility Interventions: Assess need for specialty bed    Nutrition Interventions: Document food/fluid/supplement intake    Friction and Shear Interventions: Minimize layers                Problem: Falls - Risk of  Goal: *Absence of Falls  Description: Document Gian Fall Risk and appropriate interventions in the flowsheet.   Outcome: Progressing Towards Goal  Note: Fall Risk Interventions:       Mentation Interventions: Adequate sleep, hydration, pain control, Bed/chair exit alarm, Door open when patient unattended    Medication Interventions: Bed/chair exit alarm, Patient to call before getting OOB    Elimination Interventions: Bed/chair exit alarm    History of Falls Interventions: Bed/chair exit alarm         Problem: TIA/CVA Stroke: Day 2 Until Discharge  Goal: Activity/Safety  Outcome: Progressing Towards Goal  Goal: Diagnostic Test/Procedures  Outcome: Progressing Towards Goal  Goal: Nutrition/Diet  Outcome: Not Progressing Towards Goal  Goal: Discharge Planning  Outcome: Progressing Towards Goal  Goal: Medications  Outcome: Not Progressing Towards Goal  Goal: Respiratory  Outcome: Progressing Towards Goal  Goal: Treatments/Interventions/Procedures  Outcome: Progressing Towards Goal  Goal: *Verbalizes anxiety and depression are reduced or absent  Outcome: Not Progressing Towards Goal  Goal: *Absence of aspiration  Outcome: Progressing Towards Goal  Goal: *Absence of deep venous thrombosis signs and symptoms(Stroke Metric)  Outcome: Progressing Towards Goal  Goal: *Optimal pain control at patient's stated goal  Outcome: Progressing Towards Goal  Goal: *Tolerating diet  Outcome: Not Progressing Towards Goal  Goal: *Ability to perform ADLs and demonstrates progressive mobility and function  Outcome: Not Progressing Towards Goal

## 2022-09-03 NOTE — PROGRESS NOTES
End of Shift Note     Bedside shift change report given to Sina Closs, RN  (oncoming nurse) by Raegan Martinez nurse). Report included the following information SBAR, Kardex, Intake/Output, MAR, and Recent Results     Shift worked: 7a-7p   Shift summary and any significant changes:     Patient's family visited today. Arrangements made for discharge tomorrow. AMR to  patient at 1000. He will be taken home and admitted to hospice services there. Concerns for physician to address: None    Zone phone for oncoming shift:   1001      Patient Information  Desi Ansari  71 y.o.  8/23/2022 11:14 AM by Nicolle Denton MD. Desi Ansari was admitted from Home     Problem List          Patient Active Problem List     Diagnosis Date Noted    CVA (cerebral vascular accident) ([address] 08/23/2022    VINICIO (acute kidney injury) ([address] 08/23/2022    Hypertensive urgency 08/23/2022    Acute encephalopathy 08/23/2022    Bilateral carotid artery stenosis 08/23/2022    Thrombotic stroke involving left middle cerebral artery ([address] 08/23/2022    Aphasia due to acute cerebrovascular accident (CVA) ([address] 08/23/2022    Acute alteration in mental status 08/23/2022    Convulsions ([address] 08/23/2022    History of stroke 08/23/2022    Prostate cancer ([address] 08/23/2022              Past Medical History:   Diagnosis Date    History of stroke      Prostate CA ([address]           Core Measures:  CVA: No No  CHF:No No  PNA:No No     Activity:  Activity Level: Bed Rest  Number times ambulated in hallways past shift: 0  Number of times OOB to chair past shift: 0     Cardiac:   Cardiac Monitoring: No      Cardiac Rhythm:      Access:   Current line(s): PIV   Central Line? No   PICC LINE? No      Genitourinary:   Urinary status: incontinent   Urinary Catheter?  No      Respiratory:   O2 Device: Nasal cannula  Chronic home O2 use?: NO  Incentive spirometer at bedside: NO     GI:  Last Bowel Movement Date:  (PTA)  Current diet:  DIET NPO Sips of Water with Meds  Passing flatus: YES  Tolerating current diet: NO     Pain Management:   Patient states pain is manageable on current regimen: N/A     Skin:  Shilo Score: 11  Interventions: PT/OT consult, limit briefs, internal/external urinary devices, and nutritional support     Patient Safety:  Fall Score:  Total Score: 3  Interventions: bed/chair alarm  High Fall Risk: Yes     DVT prophylaxis:  DVT prophylaxis Med- Yes  DVT prophylaxis SCD or CORRINE- No      Wounds: (If Applicable)  Wounds- No  Location      Active Consults:  IP CONSULT TO HOSPITALIST  IP CONSULT TO NEUROLOGY  IP CONSULT TO NEPHROLOGY  IP CONSULT TO PALLIATIVE CARE - PROVIDER     Length of Stay:  Expected LOS: 2d 21h  Actual LOS: 11  Discharge Plan: No

## 2022-09-04 NOTE — DISCHARGE SUMMARY
Hospitalist Discharge Summary     Patient ID:  Manisha Orona  [id number]  71 y.o.  1950 8/23/2022    PCP on record: None    Admit date: 8/23/2022  Discharge date and time: 9/4/2022    DISCHARGE DIAGNOSIS:    Acute left thalamic CVA    CONSULTATIONS:  IP CONSULT TO HOSPITALIST  IP CONSULT TO NEUROLOGY  IP CONSULT TO NEPHROLOGY  IP CONSULT TO PALLIATIVE CARE - PROVIDER    Excerpted HPI from H&P of Carlos Cano MD:     The history was obtained from patient's son at bedside, questions were answered. The patient 71years old man with past medical history significant for CVA with no residual weakness, sciatica for which he has been bedridden for at least 2 years. Patient's son reported that the patient has not been taking any medications for a long time. He also reported that he had a stroke years ago with no residual weakness. Patient does not smoke, drink alcohol or use illicit drugs. Patient recently moved from [place] and he does not have a PCP. We were asked to admit for work up and evaluation of the above problems. ______________________________________________________________________  DISCHARGE SUMMARY/HOSPITAL COURSE:  for full details see H&P, daily progress notes, labs, consult notes. Acute metabolic encephalopathy POA  Acute left thalamic CVA POA  HTN emergency /111  History of CVA with no residual weakness as per family, POA  Sepsis with elevated procalcitonin 18.9  PNA  Probable stage 4 chronic kidney disease POA  Hypernatremia Na 146 POA  VINICIO worse    Patient was bed bound secondary to sciatica and not taking any of his medications for the past 2 years. He recently moved from [facility]. He has a history of CVAs prior was admitted with acute left thalamic stroke. His seizure work up was negative. NGT was placed and dislodged by patient. 8/25 his AMS worsened with increasing lethargy. There was not change in his CT head.  It was presumed that mentation remained poor likely secondary to worsening CVA. Palliative was consulted and family presented to patient's bedside on Friday, patient's wife decided hospice is what the patient would have wanted. Discharge home with hospice  _______________________________________________________________________  Patient seen and examined by me on discharge day. Pertinent Findings:  Gen:    Not in distress  Chest: Clear lungs  CVS:   Regular rhythm. No edema  Abd:  Soft, not distended, not tender  Neuro:  Not alert.  _______________________________________________________________________  DISCHARGE MEDICATIONS:   There are no discharge medications for this patient. Patient Follow Up Instructions: Activity: As tolerated  Diet: Comfort feeding  Wound Care: Comfort wound care    Follow-up with Hospice. Follow-up Information       Follow up With Specialties Details Why Contact Info    None    None ([phone]) Patient stated that they have no PCP            ________________________________________________________________    Risk of deterioration:  DNR discharged with hospice. Condition at Discharge:  Stable  __________________________________________________________________    Disposition  Home with family and hospice services.     ____________________________________________________________________    Code Status: DNR/DNI  ___________________________________________________________________      Total time in minutes spent coordinating this discharge (includes going over instructions, follow-up, prescriptions, and preparing report for sign off to her PCP) :  >30 minutes    Signed:  Lyn Yeh MD

## 2022-09-04 NOTE — HOSPICE
[place] [address] [facility]  [phone]  Patient Name: Shira Soliz  YOB: 1950  Age:  71 yoM                                                         Principle Hospice Diagnosis: Cerebrovascular accident  Diagnoses RELATED to the terminal prognosis: Acute encephalopathy, acute on chronic kidney disease (Cr 7.66), aphasia, anorexia/cachexia, hypoalbuminemia, hypernatremia, hypertensive urgency-resolved. Date of Hospice Admission: 2022  Hospice Attending Elected by Patient: Damaris Patel MD      Admitting RN: Paradise Bean, RN  Nurse CM:  Kymberly Barillas RN  : KAYLEE Oh  :  KAYLEE Galaviz        Durable DNR: Yes, on file   Service: No        Home: TBD    Direct Observation:  The patient is a 71 yoM with a hospice diagnosis of CVA and co-morbidities of aphasia, anorexia/cachexia, encephalopathy, hypoalbuminemia, hypernatremia, hypertension. On assessment the patient is poorly responsive, does not vocalize or moan, does not move extremities. Rhonchi throughout all lung fields, upper airway secretions, snoring respirations, RR 20-22, spO2 88-90 percent on 3L continuous oxygen, dyspnea is well managed with current interventions. S1/S2/murmur, HR is tachycardic up to 120s, no edema. Absent bowel sounds, no nausea/vomiting noted, ? LBM? Reduced UOP d/t worsening renal function. Skin is intact and warm/dry/appropriate color for ethnicity. Dementia pain scale 0/10. No signs of terminal restlessness noted. Patient is bedbound and requires assistance for all ADLs. PPS 20 percent.        ER Visits/ Hospitalizations in past year:  1  Onset Date of Hospice Diagnosis: 2022  Summary of Disease Progression Leading to Hospice Diagnosis:  excerpted from Dr Shook Manual HPI dtd 2022 Shira Soliz is a 71 y.o. male with a past history of CVA with left sided weakness, Sciatica, bed bound for past for past several months, he does not take any medication, does not see a doctor. He was admitted on 8/23/2022 from home presented with acute onset of aphasia , found to have small acute stroke in ventral left thalamus . Hospital course is notable dehydration with VINICIO, hypernatremia, Multilobar pneumonia on I/V on I/V antibiotics. Nephrology and urology following. He is not a candidate for dialysis   Encephalopathy has not improved during the course of admission, he is on NGT feeding, there is concern for extension of stroke, repeat MRI is suggested we are called in for care decisions. Social history:  to wife Mayank Smith, estranged from her for several years, recently Jose Guadalupe Velazquez found out patient was on street in [place] and she brought him back to [place] in Nov 2021, patient is bed bound due to weakness of legs has hospital bed . Patient has four children, one of their son is local .   Delphine Gerardo next of kin  . PALLIATIVE DIAGNOSES:  1. Encephalopathy (Acute CVA) 2. Metabolic abnormalities( hypernatremia) 3. Aphasia. 4. Feeding difficulty due to # 1. 5. Hypoalbuminemia 6. DNR Discussion      Use LCD Guidelines and list features:   Patients will be considered to be in the terminal stage of stroke or coma (life expectancy of six months or less) if they meet the following criteria. Stroke:  ___X____  1. Karnofsky Performance Status (KPS) or Palliative Performance Scale (PPS) of 40% or less;  ___X____  2. Inability to maintain hydration and caloric intake with one of the following:        ________  a. Weight loss >10% in the last 6 months or >7.5% in the last 3 months;        ___X_____  b. Serum albumin         ________  c. Current history of pulmonary aspiration not responsive to speech language                                 pathology intervention;                       ________  d. Sequential calorie counts documenting inadequate caloric/fluid intake;                       ___X____  e.  Dysphagia severe enough to prevent the patient from receiving food and fluids                   necessary to sustain life, in a patient who declines or does not receive artificial                                 nutrition and hydration. SPIRITUAL/Social/Emotional/psych: None identified. Declines  services. Dr. Melanie Danielle on behalf of Dr Migdalia Pedersen   contacted, agrees to serve as attending provider for hospice and provided verbal certification of terminal illness with prognosis of 6 months or less life expectancy. Orders for hospice admission, medications and plan of treatment received. Medication reconciliation completed. Currently this patient has:  Supplemental O2:  YES  Peripheral IV:           Fistula:   PICC:                        PORT:           Thompson Catheter:       NG Tube:   PEG Tube:                Ostomy:        AICD: Has ICD been deactivated?   Yes:_____   No:_____   (If no, please identify ex ([address]; [facility] etc)      MEDS:  I have reviewed the patient's medication list with MD and identified the following:  Nonformulary medications: n/a  Unrelated medications:  n/a    IDT communication to include MD, SN, SW, [address] and support team.

## 2022-09-04 NOTE — PROGRESS NOTES
Patient discharged home via [facility] to be admitted to hospice care. All personal belongings sent with [facility] staff.

## 2022-09-05 NOTE — HOSPICE
RN presents to home for 0-7 EOL visit. On arrival, RN greeted by patients wife and daughter. On arrival, patient in bed with with eyes closed, unresponsive to verbal or tactile stimulation, and high respirations. Patient currently on 3 LO 2 via nasal cannula. Wife states high respirations have been going on last night but have become more increased when patient was transferred to new bed and cleaned up. Physical assessment completed and vital signs obtained. Patient given hydromorphone 1 mL (q3hrs) at 12:25 pm. Patient given Lorazepam 0.25mLs (q3 hrs) at 12:38 pm. Dr. Gaviota Finley called and new orders received at 12:50 pm. New orders received and are as followed: Dilaudid 2 mL q1 hr and Lorazepam 1 mL q 1 hour as needed. Per Dr. Gaviota Finley new medications were given. Dilaudid 2 mL given at 12:52 pm. Lorazepam 1 mL given 12:54 pm. Periods of apnea observed around 13:30 pm. Patient passed peacefully today at home with wife, children, and multiple family members present after 2 minutes of auscultation with no heart beat and no respirations. TOD 1339. Wife appears to be grieving appropriately. Patients children high bereavement risk but appropriate at departure. SW present in home for support for family and finding a  home for family. Medications wasted per hospice protocol with patient's daughter Luz Fitzgerald serving as witness. [place]  [facility] to serve needs of patient and family. Post mortem care declined by family. Lauren notified to  DME for tomorrow 22. Hospice staff, Dr. Margy Piper, and Dr. Gaviota Finley notified via e-mail of patients death.

## 2022-09-05 NOTE — HOSPICE
LCSW arrived at home and pt had already passed away. Pts wife Toby Calhoun and many family members were present. Pts wife stated they have been  for 35 yrs and have two sons together. Pts wife reported pt was very peaceful when he passed away. Pts wife was tearful at times but very accepting. Pts wife needed support with chosing a cremation provider. LCSW went down resources and pts wife chose [address] and [place]. Pts sons arrived during visit and were very tearful. Pts sons did not RN to touch pt or do any post morteum care. Pts wife reported wanting to respect her sons wishes. Pts wife requested  be called after pts sons had some time. LCSW and RN called  home when they were ready. LCSW reviewed bereavement program information and support available. Pts wife and family were appreciative of hospice and support. Pts wife stated she felt they have adequete support and did not need LCSW and RN to wait until [facility] arrived. LCSW encouraged pts wife to call with any needs.

## 2022-09-08 ENCOUNTER — HOME CARE VISIT (OUTPATIENT)
Dept: HOSPICE | Facility: HOSPICE | Age: 72
End: 2022-09-08
Payer: MEDICARE

## 2024-04-22 NOTE — PROGRESS NOTES
Speech path   This is the second day that the patient is too lethargic for po trials. He had severe dysphagia when seen Wed no swallow noted   We will follow up Monday. Recommend considering short term nourishment with NG tube. May want to address code status. Palliative care consult?    Bernardo Carter, SLP What Type Of Note Output Would You Prefer (Optional)?: Bullet Format How Severe Is Your Skin Lesion?: mild Has Your Skin Lesion Been Treated?: not been treated Is This A New Presentation, Or A Follow-Up?: Skin Lesion Which Family Member (Optional)?: Mother